# Patient Record
Sex: FEMALE | Race: BLACK OR AFRICAN AMERICAN | Employment: OTHER | URBAN - METROPOLITAN AREA
[De-identification: names, ages, dates, MRNs, and addresses within clinical notes are randomized per-mention and may not be internally consistent; named-entity substitution may affect disease eponyms.]

---

## 2019-01-01 ENCOUNTER — OP HISTORICAL/CONVERTED ENCOUNTER (OUTPATIENT)
Dept: OTHER | Age: 69
End: 2019-01-01

## 2019-07-05 ENCOUNTER — OP HISTORICAL/CONVERTED ENCOUNTER (OUTPATIENT)
Dept: OTHER | Age: 69
End: 2019-07-05

## 2019-08-13 ENCOUNTER — OP HISTORICAL/CONVERTED ENCOUNTER (OUTPATIENT)
Dept: OTHER | Age: 69
End: 2019-08-13

## 2019-10-29 ENCOUNTER — IP HISTORICAL/CONVERTED ENCOUNTER (OUTPATIENT)
Dept: OTHER | Age: 69
End: 2019-10-29

## 2020-01-01 ENCOUNTER — APPOINTMENT (OUTPATIENT)
Dept: GENERAL RADIOLOGY | Age: 70
End: 2020-01-01
Attending: EMERGENCY MEDICINE
Payer: MEDICARE

## 2020-01-01 ENCOUNTER — ANESTHESIA (OUTPATIENT)
Dept: ENDOSCOPY | Age: 70
End: 2020-01-01
Payer: MEDICARE

## 2020-01-01 ENCOUNTER — HOSPITAL ENCOUNTER (INPATIENT)
Age: 70
LOS: 3 days | Discharge: HOME HEALTH | DRG: 291 | End: 2020-11-17
Attending: EMERGENCY MEDICINE | Admitting: INTERNAL MEDICINE
Payer: MEDICARE

## 2020-01-01 ENCOUNTER — ANESTHESIA EVENT (OUTPATIENT)
Dept: ENDOSCOPY | Age: 70
End: 2020-01-01
Payer: MEDICARE

## 2020-01-01 ENCOUNTER — OP HISTORICAL/CONVERTED ENCOUNTER (OUTPATIENT)
Dept: OTHER | Age: 70
End: 2020-01-01

## 2020-01-01 ENCOUNTER — APPOINTMENT (OUTPATIENT)
Dept: CT IMAGING | Age: 70
End: 2020-01-01
Attending: EMERGENCY MEDICINE
Payer: MEDICARE

## 2020-01-01 ENCOUNTER — TRANSCRIBE ORDER (OUTPATIENT)
Dept: SCHEDULING | Age: 70
End: 2020-01-01

## 2020-01-01 ENCOUNTER — HOSPITAL ENCOUNTER (OUTPATIENT)
Dept: INFUSION THERAPY | Age: 70
Discharge: HOME | End: 2020-11-12
Payer: MEDICARE

## 2020-01-01 ENCOUNTER — ANESTHESIA EVENT (OUTPATIENT)
Dept: ANESTHESIOLOGY | Age: 70
End: 2020-01-01
Payer: MEDICARE

## 2020-01-01 ENCOUNTER — HOSPITAL ENCOUNTER (OUTPATIENT)
Age: 70
Setting detail: OUTPATIENT SURGERY
Discharge: HOME | End: 2020-10-08
Attending: INTERNAL MEDICINE | Admitting: INTERNAL MEDICINE
Payer: MEDICARE

## 2020-01-01 ENCOUNTER — HOSPITAL ENCOUNTER (EMERGENCY)
Age: 70
Discharge: ANOTHER HOSPITAL | End: 2020-10-26
Attending: EMERGENCY MEDICINE
Payer: MEDICARE

## 2020-01-01 ENCOUNTER — HOSPITAL ENCOUNTER (OUTPATIENT)
Dept: INTERVENTIONAL RADIOLOGY/VASCULAR | Age: 70
Discharge: HOME | End: 2020-11-24
Attending: INTERNAL MEDICINE

## 2020-01-01 ENCOUNTER — APPOINTMENT (OUTPATIENT)
Dept: GENERAL RADIOLOGY | Age: 70
DRG: 871 | End: 2020-01-01
Attending: HOSPITALIST
Payer: MEDICARE

## 2020-01-01 ENCOUNTER — HOSPITAL ENCOUNTER (INPATIENT)
Age: 70
LOS: 2 days | Discharge: HOME | DRG: 824 | End: 2020-10-28
Attending: EMERGENCY MEDICINE | Admitting: HOSPITALIST
Payer: MEDICARE

## 2020-01-01 ENCOUNTER — HOSPITAL ENCOUNTER (OUTPATIENT)
Dept: LAB | Age: 70
Discharge: HOME | End: 2020-11-11
Payer: MEDICARE

## 2020-01-01 ENCOUNTER — HOSPITAL ENCOUNTER (EMERGENCY)
Age: 70
Discharge: ANOTHER HOSPITAL | End: 2020-11-24
Attending: EMERGENCY MEDICINE
Payer: MEDICARE

## 2020-01-01 ENCOUNTER — HOSPITAL ENCOUNTER (OUTPATIENT)
Age: 70
Setting detail: OUTPATIENT SURGERY
Discharge: HOME | End: 2020-10-15
Attending: INTERNAL MEDICINE | Admitting: INTERNAL MEDICINE
Payer: MEDICARE

## 2020-01-01 ENCOUNTER — IP HISTORICAL/CONVERTED ENCOUNTER (OUTPATIENT)
Dept: OTHER | Age: 70
End: 2020-01-01

## 2020-01-01 ENCOUNTER — HOSPITAL ENCOUNTER (OUTPATIENT)
Dept: PREADMISSION TESTING | Age: 70
Discharge: HOME | End: 2020-10-11
Payer: COMMERCIAL

## 2020-01-01 ENCOUNTER — TELEPHONE (OUTPATIENT)
Dept: GASTROENTEROLOGY | Age: 70
End: 2020-01-01

## 2020-01-01 ENCOUNTER — ANESTHESIA (OUTPATIENT)
Dept: ANESTHESIOLOGY | Age: 70
End: 2020-01-01
Payer: MEDICARE

## 2020-01-01 ENCOUNTER — APPOINTMENT (OUTPATIENT)
Dept: CT IMAGING | Age: 70
DRG: 871 | End: 2020-01-01
Attending: HOSPITALIST
Payer: MEDICARE

## 2020-01-01 ENCOUNTER — APPOINTMENT (OUTPATIENT)
Dept: ENDOSCOPY | Age: 70
End: 2020-01-01
Attending: INTERNAL MEDICINE
Payer: MEDICARE

## 2020-01-01 ENCOUNTER — HOSPITAL ENCOUNTER (EMERGENCY)
Age: 70
Discharge: HOME | End: 2020-10-08
Attending: EMERGENCY MEDICINE
Payer: MEDICARE

## 2020-01-01 ENCOUNTER — HOSPITAL ENCOUNTER (INPATIENT)
Age: 70
LOS: 7 days | DRG: 871 | End: 2020-12-01
Attending: EMERGENCY MEDICINE | Admitting: INTERNAL MEDICINE
Payer: MEDICARE

## 2020-01-01 ENCOUNTER — APPOINTMENT (OUTPATIENT)
Dept: VASCULAR SURGERY | Age: 70
DRG: 291 | End: 2020-01-01
Attending: INTERNAL MEDICINE
Payer: MEDICARE

## 2020-01-01 ENCOUNTER — TRANSCRIBE ORDER (OUTPATIENT)
Dept: REGISTRATION | Age: 70
End: 2020-01-01

## 2020-01-01 ENCOUNTER — HOSPITAL ENCOUNTER (OUTPATIENT)
Dept: PREADMISSION TESTING | Age: 70
Discharge: HOME | End: 2020-10-04
Payer: MEDICARE

## 2020-01-01 ENCOUNTER — HOSPITAL ENCOUNTER (OUTPATIENT)
Dept: PET IMAGING | Age: 70
Discharge: HOME | End: 2020-11-21
Attending: INTERNAL MEDICINE
Payer: MEDICARE

## 2020-01-01 ENCOUNTER — APPOINTMENT (OUTPATIENT)
Dept: CT IMAGING | Age: 70
End: 2020-01-01
Attending: PHYSICIAN ASSISTANT
Payer: MEDICARE

## 2020-01-01 ENCOUNTER — APPOINTMENT (OUTPATIENT)
Dept: GENERAL RADIOLOGY | Age: 70
DRG: 291 | End: 2020-01-01
Attending: EMERGENCY MEDICINE
Payer: MEDICARE

## 2020-01-01 VITALS
DIASTOLIC BLOOD PRESSURE: 91 MMHG | HEART RATE: 60 BPM | OXYGEN SATURATION: 100 % | WEIGHT: 150 LBS | HEIGHT: 63 IN | RESPIRATION RATE: 20 BRPM | TEMPERATURE: 98.1 F | SYSTOLIC BLOOD PRESSURE: 204 MMHG | BODY MASS INDEX: 26.58 KG/M2

## 2020-01-01 VITALS
HEART RATE: 69 BPM | HEIGHT: 63 IN | OXYGEN SATURATION: 100 % | DIASTOLIC BLOOD PRESSURE: 71 MMHG | WEIGHT: 146 LBS | RESPIRATION RATE: 22 BRPM | BODY MASS INDEX: 25.87 KG/M2 | SYSTOLIC BLOOD PRESSURE: 179 MMHG | TEMPERATURE: 98.6 F

## 2020-01-01 VITALS
BODY MASS INDEX: 28.35 KG/M2 | WEIGHT: 160 LBS | HEART RATE: 65 BPM | OXYGEN SATURATION: 100 % | HEIGHT: 63 IN | SYSTOLIC BLOOD PRESSURE: 137 MMHG | DIASTOLIC BLOOD PRESSURE: 63 MMHG | TEMPERATURE: 98.1 F | RESPIRATION RATE: 16 BRPM

## 2020-01-01 VITALS
DIASTOLIC BLOOD PRESSURE: 77 MMHG | HEART RATE: 61 BPM | SYSTOLIC BLOOD PRESSURE: 169 MMHG | RESPIRATION RATE: 18 BRPM | TEMPERATURE: 97.2 F | OXYGEN SATURATION: 98 %

## 2020-01-01 VITALS
OXYGEN SATURATION: 95 % | HEART RATE: 60 BPM | RESPIRATION RATE: 18 BRPM | SYSTOLIC BLOOD PRESSURE: 147 MMHG | WEIGHT: 147 LBS | TEMPERATURE: 98.3 F | DIASTOLIC BLOOD PRESSURE: 71 MMHG | BODY MASS INDEX: 26.04 KG/M2

## 2020-01-01 VITALS
WEIGHT: 141.98 LBS | SYSTOLIC BLOOD PRESSURE: 151 MMHG | OXYGEN SATURATION: 94 % | DIASTOLIC BLOOD PRESSURE: 57 MMHG | HEART RATE: 56 BPM | BODY MASS INDEX: 25.15 KG/M2 | TEMPERATURE: 97.4 F | RESPIRATION RATE: 16 BRPM

## 2020-01-01 VITALS
SYSTOLIC BLOOD PRESSURE: 174 MMHG | RESPIRATION RATE: 26 BRPM | OXYGEN SATURATION: 88 % | WEIGHT: 146 LBS | DIASTOLIC BLOOD PRESSURE: 82 MMHG | HEART RATE: 60 BPM | BODY MASS INDEX: 25.87 KG/M2 | HEIGHT: 63 IN | TEMPERATURE: 98.6 F

## 2020-01-01 VITALS
BODY MASS INDEX: 26.35 KG/M2 | HEART RATE: 67 BPM | WEIGHT: 148.77 LBS | SYSTOLIC BLOOD PRESSURE: 121 MMHG | TEMPERATURE: 97.6 F | DIASTOLIC BLOOD PRESSURE: 55 MMHG | OXYGEN SATURATION: 97 % | RESPIRATION RATE: 20 BRPM

## 2020-01-01 VITALS
TEMPERATURE: 100.9 F | HEART RATE: 79 BPM | OXYGEN SATURATION: 94 % | SYSTOLIC BLOOD PRESSURE: 175 MMHG | RESPIRATION RATE: 21 BRPM | DIASTOLIC BLOOD PRESSURE: 81 MMHG

## 2020-01-01 VITALS
TEMPERATURE: 98.2 F | HEIGHT: 63 IN | DIASTOLIC BLOOD PRESSURE: 82 MMHG | BODY MASS INDEX: 28.67 KG/M2 | WEIGHT: 161.82 LBS | OXYGEN SATURATION: 100 % | SYSTOLIC BLOOD PRESSURE: 125 MMHG

## 2020-01-01 DIAGNOSIS — E63.9 POOR NUTRITION: ICD-10-CM

## 2020-01-01 DIAGNOSIS — R13.10 DYSPHAGIA, UNSPECIFIED TYPE: Primary | ICD-10-CM

## 2020-01-01 DIAGNOSIS — C85.90 LYMPHOMA (HCC): ICD-10-CM

## 2020-01-01 DIAGNOSIS — C83.31 RETICULOSARCOMA OF LYMPH NODES OF HEAD, FACE, AND NECK (HCC): Primary | ICD-10-CM

## 2020-01-01 DIAGNOSIS — N28.9 RENAL INSUFFICIENCY: ICD-10-CM

## 2020-01-01 DIAGNOSIS — J35.8 TONSILLAR MASS: Primary | ICD-10-CM

## 2020-01-01 DIAGNOSIS — I10 HYPERTENSION, UNSPECIFIED TYPE: ICD-10-CM

## 2020-01-01 DIAGNOSIS — J18.9 PNEUMONIA DUE TO INFECTIOUS ORGANISM, UNSPECIFIED LATERALITY, UNSPECIFIED PART OF LUNG: Primary | ICD-10-CM

## 2020-01-01 DIAGNOSIS — J39.2 THROAT MASS: Primary | ICD-10-CM

## 2020-01-01 DIAGNOSIS — C83.10 MALIGNANT LYMPHOMA, CENTROCYTIC (HCC): ICD-10-CM

## 2020-01-01 DIAGNOSIS — U07.1 COVID-19: ICD-10-CM

## 2020-01-01 DIAGNOSIS — D64.9 ANEMIA: Primary | ICD-10-CM

## 2020-01-01 DIAGNOSIS — Z71.89 ADVANCED CARE PLANNING/COUNSELING DISCUSSION: ICD-10-CM

## 2020-01-01 DIAGNOSIS — D64.9 ANEMIA: ICD-10-CM

## 2020-01-01 DIAGNOSIS — E88.09 HYPOALBUMINEMIA: ICD-10-CM

## 2020-01-01 DIAGNOSIS — Z71.89 DNR (DO NOT RESUSCITATE) DISCUSSION: ICD-10-CM

## 2020-01-01 DIAGNOSIS — Z71.89 GOALS OF CARE, COUNSELING/DISCUSSION: ICD-10-CM

## 2020-01-01 DIAGNOSIS — J18.9 PNEUMONIA OF BOTH LUNGS DUE TO INFECTIOUS ORGANISM, UNSPECIFIED PART OF LUNG: ICD-10-CM

## 2020-01-01 DIAGNOSIS — C83.31 RETICULOSARCOMA OF LYMPH NODES OF HEAD, FACE, AND NECK (HCC): ICD-10-CM

## 2020-01-01 DIAGNOSIS — I50.9 CONGESTIVE HEART FAILURE, UNSPECIFIED HF CHRONICITY, UNSPECIFIED HEART FAILURE TYPE (HCC): ICD-10-CM

## 2020-01-01 DIAGNOSIS — I50.82 BIVENTRICULAR CONGESTIVE HEART FAILURE (HCC): Primary | ICD-10-CM

## 2020-01-01 DIAGNOSIS — R51.9 ACUTE NONINTRACTABLE HEADACHE, UNSPECIFIED HEADACHE TYPE: ICD-10-CM

## 2020-01-01 DIAGNOSIS — Z20.822 SUSPECTED COVID-19 VIRUS INFECTION: ICD-10-CM

## 2020-01-01 DIAGNOSIS — D64.9 ANEMIA, UNSPECIFIED TYPE: ICD-10-CM

## 2020-01-01 DIAGNOSIS — D47.Z1 POST-TRANSPLANT LYMPHOPROLIFERATIVE DISORDER (HCC): ICD-10-CM

## 2020-01-01 DIAGNOSIS — R41.82 ALTERED MENTAL STATUS, UNSPECIFIED ALTERED MENTAL STATUS TYPE: ICD-10-CM

## 2020-01-01 DIAGNOSIS — J39.2 OROPHARYNGEAL MASS: Primary | ICD-10-CM

## 2020-01-01 LAB
ABO + RH BLD: NORMAL
ABO + RH BLD: NORMAL
ALBUMIN SERPL-MCNC: 2.5 G/DL (ref 3.5–5)
ALBUMIN SERPL-MCNC: 2.6 G/DL (ref 3.5–5)
ALBUMIN SERPL-MCNC: 2.7 G/DL (ref 3.5–5)
ALBUMIN SERPL-MCNC: 2.9 G/DL (ref 3.5–5)
ALBUMIN SERPL-MCNC: 2.9 G/DL (ref 3.5–5)
ALBUMIN SERPL-MCNC: 3.2 G/DL (ref 3.5–5)
ALBUMIN SERPL-MCNC: 3.2 G/DL (ref 3.5–5)
ALBUMIN SERPL-MCNC: 3.4 G/DL (ref 3.5–5)
ALBUMIN SERPL-MCNC: 3.6 G/DL (ref 3.5–5)
ALBUMIN/GLOB SERPL: 0.6 {RATIO} (ref 1.1–2.2)
ALBUMIN/GLOB SERPL: 0.7 {RATIO} (ref 1.1–2.2)
ALBUMIN/GLOB SERPL: 0.8 {RATIO} (ref 1.1–2.2)
ALBUMIN/GLOB SERPL: 0.9 {RATIO} (ref 1.1–2.2)
ALP SERPL-CCNC: 104 U/L (ref 45–117)
ALP SERPL-CCNC: 113 U/L (ref 45–117)
ALP SERPL-CCNC: 117 U/L (ref 45–117)
ALP SERPL-CCNC: 127 U/L (ref 45–117)
ALP SERPL-CCNC: 133 U/L (ref 45–117)
ALP SERPL-CCNC: 133 U/L (ref 45–117)
ALP SERPL-CCNC: 144 U/L (ref 45–117)
ALP SERPL-CCNC: 95 U/L (ref 45–117)
ALP SERPL-CCNC: 98 U/L (ref 45–117)
ALT SERPL-CCNC: 10 U/L (ref 12–78)
ALT SERPL-CCNC: 12 U/L (ref 12–78)
ALT SERPL-CCNC: 17 U/L (ref 12–78)
ALT SERPL-CCNC: 19 U/L (ref 12–78)
ALT SERPL-CCNC: 54 U/L (ref 12–78)
ALT SERPL-CCNC: 7 U/L (ref 12–78)
ALT SERPL-CCNC: 71 U/L (ref 12–78)
ALT SERPL-CCNC: 9 U/L (ref 12–78)
ALT SERPL-CCNC: <6 U/L (ref 12–78)
ANION GAP SERPL CALC-SCNC: 10 MMOL/L (ref 5–15)
ANION GAP SERPL CALC-SCNC: 10 MMOL/L (ref 5–15)
ANION GAP SERPL CALC-SCNC: 12 MMOL/L (ref 5–15)
ANION GAP SERPL CALC-SCNC: 13 MMOL/L (ref 5–15)
ANION GAP SERPL CALC-SCNC: 4 MMOL/L (ref 5–15)
ANION GAP SERPL CALC-SCNC: 5 MMOL/L (ref 5–15)
ANION GAP SERPL CALC-SCNC: 5 MMOL/L (ref 5–15)
ANION GAP SERPL CALC-SCNC: 6 MMOL/L (ref 5–15)
ANION GAP SERPL CALC-SCNC: 7 MMOL/L (ref 5–15)
ANION GAP SERPL CALC-SCNC: 8 MMOL/L (ref 5–15)
ANION GAP SERPL CALC-SCNC: 9 MMOL/L (ref 5–15)
ANION GAP SERPL CALC-SCNC: 9 MMOL/L (ref 5–15)
APPEARANCE UR: CLEAR
APTT PPP: 40 SEC (ref 22.1–31)
APTT PPP: 40.7 SEC (ref 23–35.7)
ARTERIAL PATENCY WRIST A: ABNORMAL
ARTERIAL PATENCY WRIST A: YES
AST SERPL W P-5'-P-CCNC: 11 U/L (ref 15–37)
AST SERPL W P-5'-P-CCNC: 15 U/L (ref 15–37)
AST SERPL W P-5'-P-CCNC: 17 U/L (ref 15–37)
AST SERPL W P-5'-P-CCNC: 20 U/L (ref 15–37)
AST SERPL W P-5'-P-CCNC: 8 U/L (ref 15–37)
AST SERPL-CCNC: 29 U/L (ref 15–37)
AST SERPL-CCNC: 31 U/L (ref 15–37)
AST SERPL-CCNC: 33 U/L (ref 15–37)
AST SERPL-CCNC: 34 U/L (ref 15–37)
ATRIAL RATE: 61 BPM
ATRIAL RATE: 65 BPM
ATRIAL RATE: 76 BPM
BACTERIA SPEC CULT: NORMAL
BACTERIA URNS QL MICRO: NEGATIVE /HPF
BASE DEFICIT BLD-SCNC: 4 MMOL/L
BASE DEFICIT BLD-SCNC: 5 MMOL/L
BASE DEFICIT BLD-SCNC: 5 MMOL/L
BASE DEFICIT BLD-SCNC: 6 MMOL/L
BASOPHILS # BLD: 0 K/UL (ref 0–0.1)
BASOPHILS # BLD: 0 K/UL (ref 0–0.2)
BASOPHILS NFR BLD: 0 % (ref 0–1)
BASOPHILS NFR BLD: 0 % (ref 0–2.5)
BASOPHILS NFR BLD: 0 % (ref 0–2.5)
BASOPHILS NFR BLD: 1 % (ref 0–2.5)
BDY SITE: ABNORMAL
BILIRUB SERPL-MCNC: 0.3 MG/DL (ref 0.2–1)
BILIRUB SERPL-MCNC: 0.4 MG/DL (ref 0.2–1)
BILIRUB SERPL-MCNC: 0.5 MG/DL (ref 0.2–1)
BILIRUB SERPL-MCNC: 0.6 MG/DL (ref 0.2–1)
BILIRUB SERPL-MCNC: 0.7 MG/DL (ref 0.2–1)
BILIRUB UR QL: NEGATIVE
BLD PROD TYP BPU: NORMAL
BLD PROD TYP BPU: NORMAL
BLOOD BANK CMNT PATIENT-IMP: NORMAL
BLOOD GROUP ANTIBODIES SERPL: NEGATIVE
BLOOD GROUP ANTIBODIES SERPL: NORMAL
BLOOD GROUP ANTIBODIES SERPL: NORMAL
BNP SERPL-MCNC: ABNORMAL PG/ML
BPU ID: NORMAL
BPU ID: NORMAL
BUN SERPL-MCNC: 40 MG/DL (ref 6–20)
BUN SERPL-MCNC: 42 MG/DL (ref 6–20)
BUN SERPL-MCNC: 43 MG/DL (ref 6–20)
BUN SERPL-MCNC: 46 MG/DL (ref 6–20)
BUN SERPL-MCNC: 48 MG/DL (ref 6–20)
BUN SERPL-MCNC: 48 MG/DL (ref 6–20)
BUN SERPL-MCNC: 49 MG/DL (ref 6–20)
BUN SERPL-MCNC: 50 MG/DL (ref 6–20)
BUN SERPL-MCNC: 51 MG/DL (ref 6–20)
BUN SERPL-MCNC: 52 MG/DL (ref 6–20)
BUN SERPL-MCNC: 53 MG/DL (ref 6–20)
BUN SERPL-MCNC: 54 MG/DL (ref 6–20)
BUN SERPL-MCNC: 55 MG/DL (ref 6–20)
BUN SERPL-MCNC: 56 MG/DL (ref 6–20)
BUN SERPL-MCNC: 57 MG/DL (ref 6–20)
BUN/CREAT SERPL: 17 (ref 12–20)
BUN/CREAT SERPL: 20 (ref 12–20)
BUN/CREAT SERPL: 21 (ref 12–20)
BUN/CREAT SERPL: 22 (ref 12–20)
BUN/CREAT SERPL: 23 (ref 12–20)
BUN/CREAT SERPL: 24 (ref 12–20)
BUN/CREAT SERPL: 25 (ref 12–20)
BUN/CREAT SERPL: 27 (ref 12–20)
BUN/CREAT SERPL: 28 (ref 12–20)
BUN/CREAT SERPL: 29 (ref 12–20)
BUN/CREAT SERPL: 30 (ref 12–20)
BUN/CREAT SERPL: 30 (ref 12–20)
BUN/CREAT SERPL: 32 (ref 12–20)
BUN/CREAT SERPL: 33 (ref 12–20)
C DIFF TOX GENS STL QL NAA+PROBE: POSITIVE
CA-I BLD-MCNC: 10 MG/DL (ref 8.5–10.1)
CA-I BLD-MCNC: 8.7 MG/DL (ref 8.5–10.1)
CA-I BLD-MCNC: 9.1 MG/DL (ref 8.5–10.1)
CA-I BLD-MCNC: 9.2 MG/DL (ref 8.5–10.1)
CA-I BLD-MCNC: 9.3 MG/DL (ref 8.5–10.1)
CA-I BLD-MCNC: 9.4 MG/DL (ref 8.5–10.1)
CA-I BLD-MCNC: 9.9 MG/DL (ref 8.5–10.1)
CA-I BLD-SCNC: 1.33 MMOL/L (ref 1.12–1.32)
CA-I BLD-SCNC: 1.35 MMOL/L (ref 1.12–1.32)
CA-I BLD-SCNC: 1.38 MMOL/L (ref 1.12–1.32)
CA-I BLD-SCNC: 1.4 MMOL/L (ref 1.12–1.32)
CALCIUM SERPL-MCNC: 5.9 MG/DL (ref 8.5–10.1)
CALCIUM SERPL-MCNC: 8.9 MG/DL (ref 8.5–10.1)
CALCIUM SERPL-MCNC: 9 MG/DL (ref 8.5–10.1)
CALCIUM SERPL-MCNC: 9 MG/DL (ref 8.5–10.1)
CALCIUM SERPL-MCNC: 9.2 MG/DL (ref 8.5–10.1)
CALCIUM SERPL-MCNC: 9.3 MG/DL (ref 8.5–10.1)
CALCIUM SERPL-MCNC: 9.4 MG/DL (ref 8.5–10.1)
CALCIUM SERPL-MCNC: 9.6 MG/DL (ref 8.5–10.1)
CALCIUM SERPL-MCNC: 9.7 MG/DL (ref 8.5–10.1)
CALCULATED P AXIS, ECG09: 2 DEGREES
CALCULATED P AXIS, ECG09: 37 DEGREES
CALCULATED P AXIS, ECG09: 58 DEGREES
CALCULATED R AXIS, ECG10: -10 DEGREES
CALCULATED R AXIS, ECG10: -14 DEGREES
CALCULATED R AXIS, ECG10: -9 DEGREES
CALCULATED T AXIS, ECG11: 119 DEGREES
CALCULATED T AXIS, ECG11: 94 DEGREES
CALCULATED T AXIS, ECG11: 97 DEGREES
CHLORIDE SERPL-SCNC: 100 MMOL/L (ref 97–108)
CHLORIDE SERPL-SCNC: 101 MMOL/L (ref 97–108)
CHLORIDE SERPL-SCNC: 102 MMOL/L (ref 97–108)
CHLORIDE SERPL-SCNC: 103 MMOL/L (ref 97–108)
CHLORIDE SERPL-SCNC: 104 MMOL/L (ref 97–108)
CHLORIDE SERPL-SCNC: 105 MMOL/L (ref 97–108)
CHLORIDE SERPL-SCNC: 106 MMOL/L (ref 97–108)
CHLORIDE SERPL-SCNC: 108 MMOL/L (ref 97–108)
CHLORIDE SERPL-SCNC: 108 MMOL/L (ref 97–108)
CHLORIDE SERPL-SCNC: 109 MMOL/L (ref 97–108)
CHLORIDE SERPL-SCNC: 109 MMOL/L (ref 97–108)
CHLORIDE SERPL-SCNC: 110 MMOL/L (ref 97–108)
CHLORIDE SERPL-SCNC: 114 MMOL/L (ref 97–108)
CHLORIDE SERPL-SCNC: 119 MMOL/L (ref 97–108)
CHLORIDE SERPL-SCNC: 97 MMOL/L (ref 97–108)
CHLORIDE SERPL-SCNC: 98 MMOL/L (ref 97–108)
CHLORIDE SERPL-SCNC: 98 MMOL/L (ref 97–108)
CHLORIDE SERPL-SCNC: 99 MMOL/L (ref 97–108)
CHLORIDE UR-SCNC: 59 MMOL/L
CO2 SERPL-SCNC: 14 MMOL/L (ref 21–32)
CO2 SERPL-SCNC: 14 MMOL/L (ref 21–32)
CO2 SERPL-SCNC: 17 MMOL/L (ref 21–32)
CO2 SERPL-SCNC: 20 MMOL/L (ref 21–32)
CO2 SERPL-SCNC: 22 MMOL/L (ref 21–32)
CO2 SERPL-SCNC: 24 MMOL/L (ref 21–32)
CO2 SERPL-SCNC: 25 MMOL/L (ref 21–32)
CO2 SERPL-SCNC: 26 MMOL/L (ref 21–32)
CO2 SERPL-SCNC: 27 MMOL/L (ref 21–32)
CO2 SERPL-SCNC: 27 MMOL/L (ref 21–32)
CO2 SERPL-SCNC: 29 MMOL/L (ref 21–32)
CO2 SERPL-SCNC: 32 MMOL/L (ref 21–32)
CO2 SERPL-SCNC: 33 MMOL/L (ref 21–32)
COLOR UR: ABNORMAL
COVID-19 RAPID TEST, COVR: DETECTED
CREAT SERPL-MCNC: 1.53 MG/DL (ref 0.55–1.02)
CREAT SERPL-MCNC: 1.65 MG/DL (ref 0.55–1.02)
CREAT SERPL-MCNC: 1.69 MG/DL (ref 0.55–1.02)
CREAT SERPL-MCNC: 1.72 MG/DL (ref 0.55–1.02)
CREAT SERPL-MCNC: 1.76 MG/DL (ref 0.55–1.02)
CREAT SERPL-MCNC: 1.78 MG/DL (ref 0.55–1.02)
CREAT SERPL-MCNC: 1.8 MG/DL (ref 0.55–1.02)
CREAT SERPL-MCNC: 1.85 MG/DL (ref 0.55–1.02)
CREAT SERPL-MCNC: 1.99 MG/DL (ref 0.55–1.02)
CREAT SERPL-MCNC: 2.01 MG/DL (ref 0.55–1.02)
CREAT SERPL-MCNC: 2.01 MG/DL (ref 0.55–1.02)
CREAT SERPL-MCNC: 2.04 MG/DL (ref 0.55–1.02)
CREAT SERPL-MCNC: 2.05 MG/DL (ref 0.55–1.02)
CREAT SERPL-MCNC: 2.1 MG/DL (ref 0.55–1.02)
CREAT SERPL-MCNC: 2.12 MG/DL (ref 0.55–1.02)
CREAT SERPL-MCNC: 2.14 MG/DL (ref 0.55–1.02)
CREAT SERPL-MCNC: 2.18 MG/DL (ref 0.55–1.02)
CREAT SERPL-MCNC: 2.38 MG/DL (ref 0.55–1.02)
CREAT SERPL-MCNC: 2.41 MG/DL (ref 0.55–1.02)
CREAT SERPL-MCNC: 2.43 MG/DL (ref 0.55–1.02)
CREAT UR-MCNC: 38.5 MG/DL
CREAT UR-MCNC: 39 MG/DL
CREAT UR-MCNC: 39.6 MG/DL
CROSSMATCH RESULT,%XM: NORMAL
CROSSMATCH RESULT,%XM: NORMAL
CRP SERPL-MCNC: 3.55 MG/DL (ref 0–0.6)
CRP SERPL-MCNC: 7.25 MG/DL (ref 0–0.6)
D DIMER PPP FEU-MCNC: 0.89 MG/L FEU (ref 0–0.65)
D DIMER PPP FEU-MCNC: 1 MG/L FEU (ref 0–0.65)
D DIMER PPP FEU-MCNC: 1.09 MG/L FEU (ref 0–0.65)
D DIMER PPP FEU-MCNC: 1.17 MG/L FEU (ref 0–0.65)
D DIMER PPP FEU-MCNC: 1.2 MG/L FEU (ref 0–0.65)
D DIMER PPP FEU-MCNC: 1.23 MG/L FEU (ref 0–0.65)
D DIMER PPP FEU-MCNC: 1.28 MG/L FEU (ref 0–0.65)
D DIMER PPP FEU-MCNC: 1.4 MG/L FEU (ref 0–0.65)
D DIMER PPP FEU-MCNC: 2.65 MG/L FEU (ref 0.19–0.5)
DEPRECATED S PYO AG THROAT QL EIA: NEGATIVE
DIAGNOSIS, 93000: NORMAL
DIFFERENTIAL METHOD BLD: ABNORMAL
ECHO AO ROOT DIAM: 3.13 CM
ECHO AV AREA PEAK VELOCITY: 2.22 CM2
ECHO AV AREA VTI: 2.16 CM2
ECHO AV MEAN GRADIENT: 9.05 MMHG
ECHO AV MEAN VELOCITY: 143.67 CM/S
ECHO AV PEAK GRADIENT: 15.45 MMHG
ECHO AV PEAK VELOCITY: 196.55 CM/S
ECHO AV VTI: 46.74 CM
ECHO EST RA PRESSURE: 8 MMHG
ECHO LA VOL 2C: 49.09 ML (ref 22–52)
ECHO LA VOL 4C: 50.65 ML (ref 22–52)
ECHO LA VOL BP: 52.34 ML (ref 22–52)
ECHO LV EDV A2C: 72.27 ML
ECHO LV EDV BP: 48.99 ML (ref 56–104)
ECHO LV EJECTION FRACTION A2C: 68 PERCENT
ECHO LV EJECTION FRACTION BIPLANE: 56.9 PERCENT (ref 55–100)
ECHO LV EJECTION FRACTION BIPLANE: 56.9 PERCENT (ref 55–100)
ECHO LV ESV A2C: 16.63 ML
ECHO LV ESV BP: 21.12 ML (ref 19–49)
ECHO LV INTERNAL DIMENSION DIASTOLIC: 4.05 CM (ref 3.9–5.3)
ECHO LV INTERNAL DIMENSION SYSTOLIC: 2.48 CM
ECHO LV IVSD: 1.42 CM (ref 0.6–0.9)
ECHO LV MASS 2D: 203.5 G (ref 67–162)
ECHO LV POSTERIOR WALL DIASTOLIC: 1.3 CM (ref 0.6–0.9)
ECHO LVOT DIAM: 2.09 CM
ECHO LVOT PEAK GRADIENT: 6.55 MMHG
ECHO LVOT PEAK VELOCITY: 127.97 CM/S
ECHO LVOT SV: 51.3 ML
ECHO LVOT SV: 51.3 ML
ECHO LVOT VTI: 29.55 CM
ECHO MV A VELOCITY: 99.75 CM/S
ECHO MV AREA PHT: 3.41 CM2
ECHO MV AREA PHT: 3.41 CM2
ECHO MV E DECELERATION TIME (DT): 222.5 MS
ECHO MV E VELOCITY: 91.82 CM/S
ECHO MV E/A RATIO: 0.92
ECHO MV PRESSURE HALF TIME (PHT): 64.52 MS
ECHO RA AREA 4C: 15 CM2
ECHO RIGHT VENTRICULAR SYSTOLIC PRESSURE (RVSP): 47 MMHG
ECHO RV INTERNAL DIMENSION: 3.1 CM
ECHO TV REGURGITANT MAX VELOCITY: 312.29 CM/S
ECHO TV REGURGITANT PEAK GRADIENT: 39.01 MMHG
EOSINOPHIL # BLD: 0 K/UL (ref 0–0.4)
EOSINOPHIL # BLD: 0 K/UL (ref 0–0.7)
EOSINOPHIL # BLD: 0.1 K/UL (ref 0–0.4)
EOSINOPHIL # BLD: 0.1 K/UL (ref 0–0.7)
EOSINOPHIL # BLD: 0.1 K/UL (ref 0–0.7)
EOSINOPHIL NFR BLD: 0 % (ref 0.9–2.9)
EOSINOPHIL NFR BLD: 0 % (ref 0–7)
EOSINOPHIL NFR BLD: 1 % (ref 0.9–2.9)
EOSINOPHIL NFR BLD: 1 % (ref 0–7)
EOSINOPHIL NFR BLD: 1 % (ref 0–7)
EOSINOPHIL NFR BLD: 2 % (ref 0.9–2.9)
ERYTHROCYTE [DISTWIDTH] IN BLOOD BY AUTOMATED COUNT: 16.6 % (ref 11.5–14.5)
ERYTHROCYTE [DISTWIDTH] IN BLOOD BY AUTOMATED COUNT: 16.7 % (ref 11.5–14.5)
ERYTHROCYTE [DISTWIDTH] IN BLOOD BY AUTOMATED COUNT: 16.8 % (ref 11.5–14.5)
ERYTHROCYTE [DISTWIDTH] IN BLOOD BY AUTOMATED COUNT: 16.8 % (ref 11.5–14.5)
ERYTHROCYTE [DISTWIDTH] IN BLOOD BY AUTOMATED COUNT: 16.9 % (ref 11.5–14.5)
ERYTHROCYTE [DISTWIDTH] IN BLOOD BY AUTOMATED COUNT: 17.3 % (ref 11.5–14.5)
ERYTHROCYTE [DISTWIDTH] IN BLOOD BY AUTOMATED COUNT: 17.4 % (ref 11.5–14.5)
ERYTHROCYTE [DISTWIDTH] IN BLOOD BY AUTOMATED COUNT: 17.6 % (ref 11.5–14.5)
ERYTHROCYTE [DISTWIDTH] IN BLOOD BY AUTOMATED COUNT: 17.6 % (ref 11.5–14.5)
ERYTHROCYTE [DISTWIDTH] IN BLOOD BY AUTOMATED COUNT: 17.7 % (ref 11.5–14.5)
ERYTHROCYTE [DISTWIDTH] IN BLOOD BY AUTOMATED COUNT: 18 % (ref 11.5–14.5)
ERYTHROCYTE [DISTWIDTH] IN BLOOD BY AUTOMATED COUNT: 18.6 % (ref 11.5–14.5)
ERYTHROCYTE [DISTWIDTH] IN BLOOD BY AUTOMATED COUNT: 19.4 % (ref 11.5–14.5)
EST. AVERAGE GLUCOSE BLD GHB EST-MCNC: 169 MG/DL
EST. AVERAGE GLUCOSE BLD GHB EST-MCNC: NORMAL MG/DL
FERRITIN SERPL-MCNC: 1158 NG/ML (ref 8–252)
FERRITIN SERPL-MCNC: 446 NG/ML (ref 8–252)
FLUAV AG NPH QL IA: NEGATIVE
FLUBV AG NOSE QL IA: NEGATIVE
FLUID CULTURE, SPNG2: NORMAL
GAS FLOW.O2 O2 DELIVERY SYS: ABNORMAL L/MIN
GAS FLOW.O2 SETTING OXYMISER: 13 L/M
GAS FLOW.O2 SETTING OXYMISER: 3.5 L/M
GAS FLOW.O2 SETTING OXYMISER: 4 L/M
GAS FLOW.O2 SETTING OXYMISER: 40 L/M
GLOBULIN SER CALC-MCNC: 3.5 G/DL (ref 2–4)
GLOBULIN SER CALC-MCNC: 3.9 G/DL (ref 2–4)
GLOBULIN SER CALC-MCNC: 4 G/DL (ref 2–4)
GLOBULIN SER CALC-MCNC: 4.1 G/DL (ref 2–4)
GLOBULIN SER CALC-MCNC: 4.3 G/DL (ref 2–4)
GLOBULIN SER CALC-MCNC: 4.5 G/DL (ref 2–4)
GLUCOSE BLD STRIP.AUTO-MCNC: 109 MG/DL (ref 65–100)
GLUCOSE BLD STRIP.AUTO-MCNC: 119 MG/DL (ref 65–100)
GLUCOSE BLD STRIP.AUTO-MCNC: 121 MG/DL (ref 65–100)
GLUCOSE BLD STRIP.AUTO-MCNC: 139 MG/DL (ref 65–100)
GLUCOSE BLD STRIP.AUTO-MCNC: 139 MG/DL (ref 65–100)
GLUCOSE BLD STRIP.AUTO-MCNC: 140 MG/DL (ref 65–100)
GLUCOSE BLD STRIP.AUTO-MCNC: 142 MG/DL (ref 65–100)
GLUCOSE BLD STRIP.AUTO-MCNC: 144 MG/DL (ref 65–100)
GLUCOSE BLD STRIP.AUTO-MCNC: 146 MG/DL (ref 65–100)
GLUCOSE BLD STRIP.AUTO-MCNC: 148 MG/DL (ref 65–100)
GLUCOSE BLD STRIP.AUTO-MCNC: 152 MG/DL (ref 65–100)
GLUCOSE BLD STRIP.AUTO-MCNC: 153 MG/DL (ref 65–100)
GLUCOSE BLD STRIP.AUTO-MCNC: 154 MG/DL (ref 65–100)
GLUCOSE BLD STRIP.AUTO-MCNC: 160 MG/DL (ref 65–100)
GLUCOSE BLD STRIP.AUTO-MCNC: 163 MG/DL (ref 65–100)
GLUCOSE BLD STRIP.AUTO-MCNC: 168 MG/DL (ref 65–100)
GLUCOSE BLD STRIP.AUTO-MCNC: 177 MG/DL (ref 65–100)
GLUCOSE BLD STRIP.AUTO-MCNC: 183 MG/DL (ref 65–100)
GLUCOSE BLD STRIP.AUTO-MCNC: 198 MG/DL (ref 65–100)
GLUCOSE BLD STRIP.AUTO-MCNC: 205 MG/DL (ref 65–100)
GLUCOSE BLD STRIP.AUTO-MCNC: 208 MG/DL (ref 65–100)
GLUCOSE BLD STRIP.AUTO-MCNC: 209 MG/DL (ref 65–100)
GLUCOSE BLD STRIP.AUTO-MCNC: 221 MG/DL (ref 65–100)
GLUCOSE BLD STRIP.AUTO-MCNC: 226 MG/DL (ref 65–100)
GLUCOSE BLD STRIP.AUTO-MCNC: 227 MG/DL (ref 65–100)
GLUCOSE BLD STRIP.AUTO-MCNC: 231 MG/DL (ref 65–100)
GLUCOSE BLD STRIP.AUTO-MCNC: 231 MG/DL (ref 65–100)
GLUCOSE BLD STRIP.AUTO-MCNC: 232 MG/DL (ref 65–100)
GLUCOSE BLD STRIP.AUTO-MCNC: 234 MG/DL (ref 65–100)
GLUCOSE BLD STRIP.AUTO-MCNC: 241 MG/DL (ref 65–100)
GLUCOSE BLD STRIP.AUTO-MCNC: 244 MG/DL (ref 65–100)
GLUCOSE BLD STRIP.AUTO-MCNC: 244 MG/DL (ref 65–100)
GLUCOSE BLD STRIP.AUTO-MCNC: 255 MG/DL (ref 65–100)
GLUCOSE BLD STRIP.AUTO-MCNC: 257 MG/DL (ref 65–100)
GLUCOSE BLD STRIP.AUTO-MCNC: 263 MG/DL (ref 65–100)
GLUCOSE BLD STRIP.AUTO-MCNC: 266 MG/DL (ref 65–100)
GLUCOSE BLD STRIP.AUTO-MCNC: 269 MG/DL (ref 65–100)
GLUCOSE BLD STRIP.AUTO-MCNC: 273 MG/DL (ref 65–100)
GLUCOSE BLD STRIP.AUTO-MCNC: 282 MG/DL (ref 65–100)
GLUCOSE BLD STRIP.AUTO-MCNC: 282 MG/DL (ref 65–100)
GLUCOSE BLD STRIP.AUTO-MCNC: 285 MG/DL (ref 65–100)
GLUCOSE BLD STRIP.AUTO-MCNC: 295 MG/DL (ref 65–100)
GLUCOSE BLD STRIP.AUTO-MCNC: 299 MG/DL (ref 65–100)
GLUCOSE BLD STRIP.AUTO-MCNC: 301 MG/DL (ref 65–100)
GLUCOSE BLD STRIP.AUTO-MCNC: 317 MG/DL (ref 65–100)
GLUCOSE BLD STRIP.AUTO-MCNC: 325 MG/DL (ref 65–100)
GLUCOSE BLD STRIP.AUTO-MCNC: 336 MG/DL (ref 65–100)
GLUCOSE BLD STRIP.AUTO-MCNC: 343 MG/DL (ref 65–100)
GLUCOSE BLD STRIP.AUTO-MCNC: 354 MG/DL (ref 65–100)
GLUCOSE BLD STRIP.AUTO-MCNC: 98 MG/DL (ref 65–100)
GLUCOSE SERPL-MCNC: 106 MG/DL (ref 65–100)
GLUCOSE SERPL-MCNC: 136 MG/DL (ref 65–100)
GLUCOSE SERPL-MCNC: 141 MG/DL (ref 65–100)
GLUCOSE SERPL-MCNC: 143 MG/DL (ref 65–100)
GLUCOSE SERPL-MCNC: 151 MG/DL (ref 65–100)
GLUCOSE SERPL-MCNC: 161 MG/DL (ref 65–100)
GLUCOSE SERPL-MCNC: 167 MG/DL (ref 65–100)
GLUCOSE SERPL-MCNC: 177 MG/DL (ref 65–100)
GLUCOSE SERPL-MCNC: 178 MG/DL (ref 65–100)
GLUCOSE SERPL-MCNC: 180 MG/DL (ref 65–100)
GLUCOSE SERPL-MCNC: 188 MG/DL (ref 65–100)
GLUCOSE SERPL-MCNC: 209 MG/DL (ref 65–100)
GLUCOSE SERPL-MCNC: 220 MG/DL (ref 65–100)
GLUCOSE SERPL-MCNC: 235 MG/DL (ref 65–100)
GLUCOSE SERPL-MCNC: 240 MG/DL (ref 65–100)
GLUCOSE SERPL-MCNC: 240 MG/DL (ref 65–100)
GLUCOSE SERPL-MCNC: 249 MG/DL (ref 65–100)
GLUCOSE SERPL-MCNC: 284 MG/DL (ref 65–100)
GLUCOSE SERPL-MCNC: 302 MG/DL (ref 65–100)
GLUCOSE SERPL-MCNC: 364 MG/DL (ref 65–100)
GLUCOSE UR STRIP.AUTO-MCNC: 50 MG/DL
HBA1C MFR BLD: 7.5 % (ref 4–5.6)
HBA1C MFR BLD: NORMAL % (ref 4.2–5.8)
HBV CORE AB SERPL QL IA: NEGATIVE
HBV SURFACE AB SER QL: REACTIVE
HBV SURFACE AB SER-ACNC: 17.18 MIU/ML
HBV SURFACE AG SER QL: <0.1 INDEX
HBV SURFACE AG SER QL: NEGATIVE
HCO3 BLD-SCNC: 21.3 MMOL/L (ref 22–26)
HCO3 BLD-SCNC: 21.8 MMOL/L (ref 22–26)
HCO3 BLD-SCNC: 22.2 MMOL/L (ref 22–26)
HCO3 BLD-SCNC: 22.3 MMOL/L (ref 22–26)
HCT VFR BLD AUTO: 30.9 % (ref 36–46)
HCT VFR BLD AUTO: 31.4 % (ref 35–47)
HCT VFR BLD AUTO: 32.7 % (ref 35–47)
HCT VFR BLD AUTO: 32.9 % (ref 35–47)
HCT VFR BLD AUTO: 33.7 % (ref 36–46)
HCT VFR BLD AUTO: 33.8 % (ref 35–47)
HCT VFR BLD AUTO: 33.9 % (ref 35–47)
HCT VFR BLD AUTO: 34.9 % (ref 36–46)
HCT VFR BLD AUTO: 35.1 % (ref 35–47)
HCT VFR BLD AUTO: 36.2 % (ref 35–47)
HCT VFR BLD AUTO: 36.4 % (ref 35–47)
HCT VFR BLD AUTO: 37 % (ref 36–46)
HCT VFR BLD AUTO: 38.7 % (ref 35–47)
HCT VFR BLD AUTO: 40.8 % (ref 36–46)
HCT VFR BLD AUTO: 46 % (ref 35–47)
HCV AB SERPL QL IA: NONREACTIVE
HCV COMMENT,HCGAC: NORMAL
HEALTH STATUS, XMCV2T: ABNORMAL
HGB BLD-MCNC: 10 G/DL (ref 11.5–16)
HGB BLD-MCNC: 10.1 G/DL (ref 11.5–16)
HGB BLD-MCNC: 10.3 G/DL (ref 11.5–16)
HGB BLD-MCNC: 10.8 G/DL (ref 11.5–16)
HGB BLD-MCNC: 10.8 G/DL (ref 11.5–16)
HGB BLD-MCNC: 11.1 G/DL (ref 13.5–17.5)
HGB BLD-MCNC: 11.2 G/DL (ref 13.5–17.5)
HGB BLD-MCNC: 11.5 G/DL (ref 11.5–16)
HGB BLD-MCNC: 11.7 G/DL (ref 13.5–17.5)
HGB BLD-MCNC: 13 G/DL (ref 13.5–17.5)
HGB BLD-MCNC: 13.5 G/DL (ref 11.5–16)
HGB BLD-MCNC: 9.5 G/DL (ref 11.5–16)
HGB BLD-MCNC: 9.8 G/DL (ref 11.5–16)
HGB BLD-MCNC: 9.8 G/DL (ref 13.5–17.5)
HGB BLD-MCNC: 9.9 G/DL (ref 11.5–16)
HGB UR QL STRIP: NEGATIVE
HIV 1+2 AB+HIV1 P24 AG SERPL QL IA: NONREACTIVE
HIV12 RESULT COMMENT, HHIVC: NORMAL
IMM GRANULOCYTES # BLD AUTO: 0 K/UL (ref 0–0.04)
IMM GRANULOCYTES # BLD AUTO: 0.1 K/UL (ref 0–0.04)
IMM GRANULOCYTES # BLD AUTO: 0.1 K/UL (ref 0–0.04)
IMM GRANULOCYTES NFR BLD AUTO: 0 % (ref 0–0.5)
IMM GRANULOCYTES NFR BLD AUTO: 1 % (ref 0–0.5)
INR PPP: 1.1 (ref 0.9–1.1)
INR PPP: 1.2 (ref 0.9–1.1)
INR PPP: 1.2 (ref 0.9–1.1)
INTERPRETATION: ABNORMAL
IRON SATN MFR SERPL: 14 % (ref 20–50)
IRON SERPL-MCNC: 28 UG/DL (ref 35–150)
KETONES UR QL STRIP.AUTO: NEGATIVE MG/DL
L PNEUMO1 AG UR QL IA: NEGATIVE
LACTATE SERPL-SCNC: 0.5 MMOL/L (ref 0.4–2)
LACTATE SERPL-SCNC: 0.7 MMOL/L (ref 0.4–2)
LACTATE SERPL-SCNC: 1 MMOL/L (ref 0.4–2)
LDH SERPL L TO P-CCNC: 502 U/L (ref 81–246)
LDH SERPL L TO P-CCNC: 862 U/L (ref 81–246)
LEUKOCYTE ESTERASE UR QL STRIP.AUTO: NEGATIVE
LIPASE SERPL-CCNC: 114 U/L (ref 73–393)
LVOT MG: 2.83 MMHG
LYMPHOCYTES # BLD: 0.1 K/UL (ref 0.8–3.5)
LYMPHOCYTES # BLD: 0.1 K/UL (ref 0.8–3.5)
LYMPHOCYTES # BLD: 0.2 K/UL (ref 0.8–3.5)
LYMPHOCYTES # BLD: 0.3 K/UL (ref 1–4.8)
LYMPHOCYTES # BLD: 0.4 K/UL (ref 1–4.8)
LYMPHOCYTES # BLD: 0.5 K/UL (ref 0.8–3.5)
LYMPHOCYTES # BLD: 0.5 K/UL (ref 1–4.8)
LYMPHOCYTES # BLD: 0.7 K/UL (ref 0.8–3.5)
LYMPHOCYTES # BLD: 1 K/UL (ref 0.8–3.5)
LYMPHOCYTES NFR BLD: 1 % (ref 12–49)
LYMPHOCYTES NFR BLD: 1 % (ref 12–49)
LYMPHOCYTES NFR BLD: 12 % (ref 12–49)
LYMPHOCYTES NFR BLD: 16 % (ref 12–49)
LYMPHOCYTES NFR BLD: 18 % (ref 12–49)
LYMPHOCYTES NFR BLD: 21 % (ref 12–49)
LYMPHOCYTES NFR BLD: 3 % (ref 12–49)
LYMPHOCYTES NFR BLD: 5 % (ref 12–49)
LYMPHOCYTES NFR BLD: 5 % (ref 20.5–51.1)
LYMPHOCYTES NFR BLD: 6 % (ref 12–49)
LYMPHOCYTES NFR BLD: 7 % (ref 20.5–51.1)
LYMPHOCYTES NFR BLD: 8 % (ref 12–49)
LYMPHOCYTES NFR BLD: 8 % (ref 20.5–51.1)
LYMPHOCYTES NFR BLD: 9 % (ref 20.5–51.1)
LYMPHOCYTES NFR BLD: 9 % (ref 20.5–51.1)
MAGNESIUM SERPL-MCNC: 1.8 MG/DL (ref 1.6–2.4)
MAGNESIUM SERPL-MCNC: 2 MG/DL (ref 1.6–2.4)
MAGNESIUM SERPL-MCNC: 2.1 MG/DL (ref 1.6–2.4)
MAGNESIUM SERPL-MCNC: 2.3 MG/DL (ref 1.6–2.4)
MCH RBC QN AUTO: 26.8 PG (ref 26–34)
MCH RBC QN AUTO: 27.2 PG (ref 26–34)
MCH RBC QN AUTO: 27.2 PG (ref 26–34)
MCH RBC QN AUTO: 27.3 PG (ref 26–34)
MCH RBC QN AUTO: 27.3 PG (ref 26–34)
MCH RBC QN AUTO: 27.4 PG (ref 26–34)
MCH RBC QN AUTO: 27.6 PG (ref 26–34)
MCH RBC QN AUTO: 27.8 PG (ref 26–34)
MCH RBC QN AUTO: 28.1 PG (ref 31–34)
MCH RBC QN AUTO: 28.3 PG (ref 26–34)
MCH RBC QN AUTO: 28.3 PG (ref 31–34)
MCH RBC QN AUTO: 28.3 PG (ref 31–34)
MCH RBC QN AUTO: 28.5 PG (ref 31–34)
MCH RBC QN AUTO: 28.8 PG (ref 26–34)
MCH RBC QN AUTO: 29.1 PG (ref 31–34)
MCHC RBC AUTO-ENTMCNC: 29.3 G/DL (ref 30–36.5)
MCHC RBC AUTO-ENTMCNC: 29.3 G/DL (ref 30–36.5)
MCHC RBC AUTO-ENTMCNC: 29.5 G/DL (ref 30–36.5)
MCHC RBC AUTO-ENTMCNC: 29.7 G/DL (ref 30–36.5)
MCHC RBC AUTO-ENTMCNC: 29.7 G/DL (ref 30–36.5)
MCHC RBC AUTO-ENTMCNC: 29.8 G/DL (ref 30–36.5)
MCHC RBC AUTO-ENTMCNC: 29.9 G/DL (ref 30–36.5)
MCHC RBC AUTO-ENTMCNC: 30 G/DL (ref 30–36.5)
MCHC RBC AUTO-ENTMCNC: 30.1 G/DL (ref 30–36.5)
MCHC RBC AUTO-ENTMCNC: 30.3 G/DL (ref 30–36.5)
MCHC RBC AUTO-ENTMCNC: 31.5 G/DL (ref 31–36)
MCHC RBC AUTO-ENTMCNC: 31.7 G/DL (ref 31–36)
MCHC RBC AUTO-ENTMCNC: 31.9 G/DL (ref 31–36)
MCHC RBC AUTO-ENTMCNC: 32.1 G/DL (ref 31–36)
MCHC RBC AUTO-ENTMCNC: 33 G/DL (ref 31–36)
MCV RBC AUTO: 86.3 FL (ref 80–100)
MCV RBC AUTO: 88 FL (ref 80–100)
MCV RBC AUTO: 88.6 FL (ref 80–100)
MCV RBC AUTO: 89.3 FL (ref 80–100)
MCV RBC AUTO: 90.3 FL (ref 80–99)
MCV RBC AUTO: 91.6 FL (ref 80–99)
MCV RBC AUTO: 91.7 FL (ref 80–99)
MCV RBC AUTO: 91.9 FL (ref 80–100)
MCV RBC AUTO: 92.1 FL (ref 80–99)
MCV RBC AUTO: 92.4 FL (ref 80–99)
MCV RBC AUTO: 92.4 FL (ref 80–99)
MCV RBC AUTO: 92.6 FL (ref 80–99)
MCV RBC AUTO: 93.4 FL (ref 80–99)
MCV RBC AUTO: 94.7 FL (ref 80–99)
MCV RBC AUTO: 95.2 FL (ref 80–99)
MICROALBUMIN UR-MCNC: 98.6 MG/DL
MICROALBUMIN/CREAT UR-RTO: 2561 MG/G (ref 0–30)
MONOCYTES # BLD: 0.1 K/UL (ref 0–1)
MONOCYTES # BLD: 0.2 K/UL (ref 0–1)
MONOCYTES # BLD: 0.3 K/UL (ref 0–1)
MONOCYTES # BLD: 0.4 K/UL (ref 0–1)
MONOCYTES # BLD: 0.5 K/UL (ref 0.2–2.4)
MONOCYTES # BLD: 0.5 K/UL (ref 0–1)
MONOCYTES # BLD: 0.6 K/UL (ref 0.2–2.4)
MONOCYTES # BLD: 0.6 K/UL (ref 0.2–2.4)
MONOCYTES # BLD: 0.7 K/UL (ref 0.2–2.4)
MONOCYTES # BLD: 0.7 K/UL (ref 0.2–2.4)
MONOCYTES # BLD: 0.7 K/UL (ref 0–1)
MONOCYTES NFR BLD: 11 % (ref 1.7–9.3)
MONOCYTES NFR BLD: 11 % (ref 1.7–9.3)
MONOCYTES NFR BLD: 11 % (ref 5–13)
MONOCYTES NFR BLD: 12 % (ref 1.7–9.3)
MONOCYTES NFR BLD: 12 % (ref 1.7–9.3)
MONOCYTES NFR BLD: 14 % (ref 1.7–9.3)
MONOCYTES NFR BLD: 14 % (ref 5–13)
MONOCYTES NFR BLD: 17 % (ref 5–13)
MONOCYTES NFR BLD: 19 % (ref 5–13)
MONOCYTES NFR BLD: 2 % (ref 5–13)
MONOCYTES NFR BLD: 3 % (ref 5–13)
MONOCYTES NFR BLD: 3 % (ref 5–13)
MONOCYTES NFR BLD: 4 % (ref 5–13)
NEUTS SEG # BLD: 1.4 K/UL (ref 1.8–8)
NEUTS SEG # BLD: 1.6 K/UL (ref 1.8–8)
NEUTS SEG # BLD: 2.4 K/UL (ref 1.8–8)
NEUTS SEG # BLD: 2.5 K/UL (ref 1.8–8)
NEUTS SEG # BLD: 3.1 K/UL (ref 1.8–8)
NEUTS SEG # BLD: 3.3 K/UL (ref 1.8–8)
NEUTS SEG # BLD: 3.8 K/UL (ref 1.8–8)
NEUTS SEG # BLD: 4 K/UL (ref 1.8–7.7)
NEUTS SEG # BLD: 4 K/UL (ref 1.8–7.7)
NEUTS SEG # BLD: 4.2 K/UL (ref 1.8–7.7)
NEUTS SEG # BLD: 4.5 K/UL (ref 1.8–7.7)
NEUTS SEG # BLD: 5 K/UL (ref 1.8–7.7)
NEUTS SEG # BLD: 5.8 K/UL (ref 1.8–8)
NEUTS SEG # BLD: 8.2 K/UL (ref 1.8–8)
NEUTS SEG # BLD: 9 K/UL (ref 1.8–8)
NEUTS SEG NFR BLD: 64 % (ref 32–75)
NEUTS SEG NFR BLD: 67 % (ref 32–75)
NEUTS SEG NFR BLD: 68 % (ref 32–75)
NEUTS SEG NFR BLD: 75 % (ref 42–75)
NEUTS SEG NFR BLD: 77 % (ref 32–75)
NEUTS SEG NFR BLD: 78 % (ref 42–75)
NEUTS SEG NFR BLD: 78 % (ref 42–75)
NEUTS SEG NFR BLD: 79 % (ref 42–75)
NEUTS SEG NFR BLD: 81 % (ref 32–75)
NEUTS SEG NFR BLD: 84 % (ref 42–75)
NEUTS SEG NFR BLD: 91 % (ref 32–75)
NEUTS SEG NFR BLD: 91 % (ref 32–75)
NEUTS SEG NFR BLD: 93 % (ref 32–75)
NEUTS SEG NFR BLD: 96 % (ref 32–75)
NEUTS SEG NFR BLD: 96 % (ref 32–75)
NITRITE UR QL STRIP.AUTO: NEGATIVE
NRBC # BLD: 0 K/UL
NRBC # BLD: 0 K/UL
NRBC # BLD: 0 K/UL (ref 0–0.01)
NRBC # BLD: 0.01 K/UL
NRBC BLD-RTO: 0 PER 100 WBC
NRBC BLD-RTO: 10 PER 100 WBC
NRBC BLD-RTO: 20 PER 100 WBC
O2/TOTAL GAS SETTING VFR VENT: 100 %
ORGANISM ID, SPNG3: NORMAL
OSMOLALITY UR: 390 MOSM/KG H2O
P-R INTERVAL, ECG05: 152 MS
P-R INTERVAL, ECG05: 152 MS
P-R INTERVAL, ECG05: 160 MS
PCO2 BLD: 43.9 MMHG (ref 35–45)
PCO2 BLD: 48.2 MMHG (ref 35–45)
PCO2 BLD: 48.4 MMHG (ref 35–45)
PCO2 BLD: 49 MMHG (ref 35–45)
PCR REFLEX: ABNORMAL
PERFORMED BY, TECHID: ABNORMAL
PH BLD: 7.25 [PH] (ref 7.35–7.45)
PH BLD: 7.26 [PH] (ref 7.35–7.45)
PH BLD: 7.27 [PH] (ref 7.35–7.45)
PH BLD: 7.31 [PH] (ref 7.35–7.45)
PH UR STRIP: 7 [PH] (ref 5–8)
PHOSPHATE SERPL-MCNC: 2.9 MG/DL (ref 2.6–4.7)
PHOSPHATE SERPL-MCNC: 3 MG/DL (ref 2.6–4.7)
PHOSPHATE SERPL-MCNC: 3 MG/DL (ref 2.6–4.7)
PHOSPHATE SERPL-MCNC: 3.2 MG/DL (ref 2.6–4.7)
PHOSPHATE SERPL-MCNC: 3.5 MG/DL (ref 2.6–4.7)
PHOSPHATE SERPL-MCNC: 3.5 MG/DL (ref 2.6–4.7)
PHOSPHATE SERPL-MCNC: 3.7 MG/DL (ref 2.6–4.7)
PHOSPHATE SERPL-MCNC: 4.2 MG/DL (ref 2.6–4.7)
PLATELET # BLD AUTO: 155 K/UL (ref 150–400)
PLATELET # BLD AUTO: 185 K/UL (ref 150–400)
PLATELET # BLD AUTO: 192 K/UL (ref 150–400)
PLATELET # BLD AUTO: 193 K/UL (ref 150–400)
PLATELET # BLD AUTO: 197 K/UL (ref 150–400)
PLATELET # BLD AUTO: 198 K/UL (ref 150–400)
PLATELET # BLD AUTO: 201 K/UL (ref 150–400)
PLATELET # BLD AUTO: 213 K/UL (ref 150–400)
PLATELET # BLD AUTO: 222 K/UL (ref 150–400)
PLATELET # BLD AUTO: 227 K/UL
PLATELET # BLD AUTO: 239 K/UL (ref 150–400)
PLATELET # BLD AUTO: 243 K/UL
PLATELET # BLD AUTO: 258 K/UL
PLATELET # BLD AUTO: 287 K/UL
PLATELET # BLD AUTO: 295 K/UL
PLATELET COMMENTS,PCOM: ABNORMAL
PLATELET COMMENTS,PCOM: ABNORMAL
PLEASE NOTE, SPNG4: NORMAL
PMV BLD AUTO: 10.2 FL (ref 8.9–12.9)
PMV BLD AUTO: 10.3 FL (ref 8.9–12.9)
PMV BLD AUTO: 10.4 FL (ref 8.9–12.9)
PMV BLD AUTO: 10.5 FL (ref 8.9–12.9)
PMV BLD AUTO: 10.6 FL (ref 8.9–12.9)
PMV BLD AUTO: 10.6 FL (ref 8.9–12.9)
PMV BLD AUTO: 10.8 FL (ref 8.9–12.9)
PMV BLD AUTO: 7.3 FL (ref 6.5–11.5)
PMV BLD AUTO: 7.3 FL (ref 6.5–11.5)
PMV BLD AUTO: 7.6 FL (ref 6.5–11.5)
PMV BLD AUTO: 7.9 FL (ref 6.5–11.5)
PMV BLD AUTO: 8.2 FL (ref 6.5–11.5)
PMV BLD AUTO: 9.6 FL (ref 8.9–12.9)
PMV BLD AUTO: 9.8 FL (ref 8.9–12.9)
PMV BLD AUTO: 9.9 FL (ref 8.9–12.9)
PO2 BLD: 42 MMHG (ref 80–100)
PO2 BLD: 48 MMHG (ref 80–100)
PO2 BLD: 69 MMHG (ref 80–100)
PO2 BLD: 82 MMHG (ref 80–100)
POTASSIUM SERPL-SCNC: 3.7 MMOL/L (ref 3.5–5.1)
POTASSIUM SERPL-SCNC: 4.1 MMOL/L (ref 3.5–5.1)
POTASSIUM SERPL-SCNC: 4.2 MMOL/L (ref 3.5–5.1)
POTASSIUM SERPL-SCNC: 4.3 MMOL/L (ref 3.5–5.1)
POTASSIUM SERPL-SCNC: 4.5 MMOL/L (ref 3.5–5.1)
POTASSIUM SERPL-SCNC: 4.5 MMOL/L (ref 3.5–5.1)
POTASSIUM SERPL-SCNC: 4.8 MMOL/L (ref 3.5–5.1)
POTASSIUM SERPL-SCNC: 5 MMOL/L (ref 3.5–5.1)
POTASSIUM SERPL-SCNC: 5.1 MMOL/L (ref 3.5–5.1)
POTASSIUM SERPL-SCNC: 5.2 MMOL/L (ref 3.5–5.1)
POTASSIUM SERPL-SCNC: 5.4 MMOL/L (ref 3.5–5.1)
POTASSIUM SERPL-SCNC: 5.7 MMOL/L (ref 3.5–5.1)
POTASSIUM SERPL-SCNC: 5.8 MMOL/L (ref 3.5–5.1)
POTASSIUM SERPL-SCNC: 6 MMOL/L (ref 3.5–5.1)
POTASSIUM SERPL-SCNC: 6.2 MMOL/L (ref 3.5–5.1)
POTASSIUM SERPL-SCNC: 6.5 MMOL/L (ref 3.5–5.1)
POTASSIUM UR-SCNC: 23 MMOL/L
PROCALCITONIN SERPL-MCNC: 0.05 NG/ML
PROCALCITONIN SERPL-MCNC: 0.06 NG/ML
PROCALCITONIN SERPL-MCNC: 0.07 NG/ML
PROCALCITONIN SERPL-MCNC: 0.5 NG/ML
PROT SERPL-MCNC: 6.1 G/DL (ref 6.4–8.2)
PROT SERPL-MCNC: 6.6 G/DL (ref 6.4–8.2)
PROT SERPL-MCNC: 6.8 G/DL (ref 6.4–8.2)
PROT SERPL-MCNC: 7 G/DL (ref 6.4–8.2)
PROT SERPL-MCNC: 7 G/DL (ref 6.4–8.2)
PROT SERPL-MCNC: 7.2 G/DL (ref 6.4–8.2)
PROT SERPL-MCNC: 7.2 G/DL (ref 6.4–8.2)
PROT SERPL-MCNC: 7.5 G/DL (ref 6.4–8.2)
PROT SERPL-MCNC: 7.5 G/DL (ref 6.4–8.2)
PROT UR STRIP-MCNC: 100 MG/DL
PROT UR-MCNC: 139 MG/DL (ref 0–11.9)
PROT/CREAT UR-RTO: 3.6
PROTHROMBIN TIME: 11.7 SEC (ref 9–11.1)
PROTHROMBIN TIME: 12.6 SEC (ref 9–11.1)
PROTHROMBIN TIME: 14.5 SEC (ref 11.9–14.7)
Q-T INTERVAL, ECG07: 392 MS
Q-T INTERVAL, ECG07: 468 MS
Q-T INTERVAL, ECG07: 469 MS
QRS DURATION, ECG06: 100 MS
QRS DURATION, ECG06: 88 MS
QRS DURATION, ECG06: 92 MS
QTC CALCULATION (BEZET), ECG08: 441 MS
QTC CALCULATION (BEZET), ECG08: 471 MS
QTC CALCULATION (BEZET), ECG08: 488 MS
RBC # BLD AUTO: 3.3 M/UL (ref 3.8–5.2)
RBC # BLD AUTO: 3.36 M/UL (ref 4.5–5.9)
RBC # BLD AUTO: 3.55 M/UL (ref 3.8–5.2)
RBC # BLD AUTO: 3.56 M/UL (ref 3.8–5.2)
RBC # BLD AUTO: 3.57 M/UL (ref 3.8–5.2)
RBC # BLD AUTO: 3.67 M/UL (ref 3.8–5.2)
RBC # BLD AUTO: 3.76 M/UL (ref 3.8–5.2)
RBC # BLD AUTO: 3.9 M/UL (ref 4.5–5.9)
RBC # BLD AUTO: 3.95 M/UL (ref 3.8–5.2)
RBC # BLD AUTO: 3.97 M/UL (ref 4.5–5.9)
RBC # BLD AUTO: 4.03 M/UL (ref 3.8–5.2)
RBC # BLD AUTO: 4.14 M/UL (ref 4.5–5.9)
RBC # BLD AUTO: 4.22 M/UL (ref 3.8–5.2)
RBC # BLD AUTO: 4.61 M/UL (ref 4.5–5.9)
RBC # BLD AUTO: 4.97 M/UL (ref 3.8–5.2)
RBC #/AREA URNS HPF: ABNORMAL /HPF (ref 0–5)
RBC MORPH BLD: ABNORMAL
S PNEUM AG SPEC QL LA: NEGATIVE
SAO2 % BLD: 70 % (ref 92–97)
SAO2 % BLD: 76 % (ref 92–97)
SAO2 % BLD: 92 % (ref 92–97)
SAO2 % BLD: 94 % (ref 92–97)
SARS-COV-2, COV2: DETECTED
SARS-COV-2, COV2: NORMAL
SARS-COV-2, COV2NT: NOT DETECTED
SERVICE CMNT-IMP: ABNORMAL
SERVICE CMNT-IMP: NORMAL
SERVICE CMNT-IMP: NORMAL
SODIUM SERPL-SCNC: 129 MMOL/L (ref 136–145)
SODIUM SERPL-SCNC: 134 MMOL/L (ref 136–145)
SODIUM SERPL-SCNC: 135 MMOL/L (ref 136–145)
SODIUM SERPL-SCNC: 136 MMOL/L (ref 136–145)
SODIUM SERPL-SCNC: 137 MMOL/L (ref 136–145)
SODIUM SERPL-SCNC: 138 MMOL/L (ref 136–145)
SODIUM SERPL-SCNC: 139 MMOL/L (ref 136–145)
SODIUM SERPL-SCNC: 140 MMOL/L (ref 136–145)
SODIUM SERPL-SCNC: 143 MMOL/L (ref 136–145)
SODIUM SERPL-SCNC: 146 MMOL/L (ref 136–145)
SOURCE, COVRS: ABNORMAL
SP GR UR REFRACTOMETRY: 1.01 (ref 1–1.03)
SPECIAL REQUESTS,SREQ: NORMAL
SPECIMEN EXP DATE BLD: NORMAL
SPECIMEN EXP DATE BLD: NORMAL
SPECIMEN SOURCE, FCOV2M: ABNORMAL
SPECIMEN SOURCE: ABNORMAL
SPECIMEN SOURCE: NORMAL
SPECIMEN SOURCE: NORMAL
SPECIMEN TYPE, XMCV1T: ABNORMAL
SPECIMEN TYPE: ABNORMAL
SPECIMEN, SPNG1: NORMAL
STATUS OF UNIT,%ST: NORMAL
STATUS OF UNIT,%ST: NORMAL
TACROLIMUS, UTACRT: 1.1 NG/ML
TACROLIMUS, UTACRT: 1.8 NG/ML
TACROLIMUS, UTACRT: 2.8 NG/ML
TACROLIMUS, UTACRT: 5.2 NG/ML
THERAPEUTIC RANGE,PTTT: ABNORMAL SEC (ref 68–109)
THERAPEUTIC RANGE,PTTT: ABNORMAL SECS (ref 58–77)
TIBC SERPL-MCNC: 206 UG/DL (ref 250–450)
TOTAL RESP. RATE, ITRR: 18
TOTAL RESP. RATE, ITRR: 18
TRANSFUSION STATUS PATIENT QL: NORMAL
TRANSFUSION STATUS PATIENT QL: NORMAL
TROPONIN I SERPL-MCNC: <0.05 NG/ML
TSH SERPL DL<=0.05 MIU/L-ACNC: 3.26 UIU/ML (ref 0.36–3.74)
UA: UC IF INDICATED,UAUC: ABNORMAL
UNIT DIVISION, %UDIV: 0
UNIT DIVISION, %UDIV: 0
URATE SERPL-MCNC: 10.1 MG/DL (ref 2.6–6)
UROBILINOGEN UR QL STRIP.AUTO: 0.1 EU/DL (ref 0.1–1)
VENTRICULAR RATE, ECG03: 61 BPM
VENTRICULAR RATE, ECG03: 65 BPM
VENTRICULAR RATE, ECG03: 76 BPM
WBC # BLD AUTO: 2 K/UL (ref 3.6–11)
WBC # BLD AUTO: 2.1 K/UL (ref 3.6–11)
WBC # BLD AUTO: 3.1 K/UL (ref 3.6–11)
WBC # BLD AUTO: 3.6 K/UL (ref 3.6–11)
WBC # BLD AUTO: 3.8 K/UL (ref 3.6–11)
WBC # BLD AUTO: 4.1 K/UL (ref 3.6–11)
WBC # BLD AUTO: 4.7 K/UL (ref 3.6–11)
WBC # BLD AUTO: 5.1 K/UL (ref 4.4–11.3)
WBC # BLD AUTO: 5.3 K/UL (ref 4.4–11.3)
WBC # BLD AUTO: 5.4 K/UL (ref 4.4–11.3)
WBC # BLD AUTO: 5.8 K/UL (ref 4.4–11.3)
WBC # BLD AUTO: 6 K/UL (ref 4.4–11.3)
WBC # BLD AUTO: 6.2 K/UL (ref 3.6–11)
WBC # BLD AUTO: 8.6 K/UL (ref 3.6–11)
WBC # BLD AUTO: 9.4 K/UL (ref 3.6–11)
WBC URNS QL MICRO: ABNORMAL /HPF (ref 0–4)

## 2020-01-01 PROCEDURE — 74011250636 HC RX REV CODE- 250/636: Performed by: INTERNAL MEDICINE

## 2020-01-01 PROCEDURE — 87635 SARS-COV-2 COVID-19 AMP PRB: CPT

## 2020-01-01 PROCEDURE — 85025 COMPLETE CBC W/AUTO DIFF WBC: CPT

## 2020-01-01 PROCEDURE — 36415 COLL VENOUS BLD VENIPUNCTURE: CPT

## 2020-01-01 PROCEDURE — 74011250636 HC RX REV CODE- 250/636: Performed by: NURSE PRACTITIONER

## 2020-01-01 PROCEDURE — 74011250636 HC RX REV CODE- 250/636: Performed by: EMERGENCY MEDICINE

## 2020-01-01 PROCEDURE — 65270000029 HC RM PRIVATE

## 2020-01-01 PROCEDURE — 87389 HIV-1 AG W/HIV-1&-2 AB AG IA: CPT

## 2020-01-01 PROCEDURE — 82962 GLUCOSE BLOOD TEST: CPT

## 2020-01-01 PROCEDURE — 74011250637 HC RX REV CODE- 250/637: Performed by: HOSPITALIST

## 2020-01-01 PROCEDURE — 87449 NOS EACH ORGANISM AG IA: CPT

## 2020-01-01 PROCEDURE — C9113 INJ PANTOPRAZOLE SODIUM, VIA: HCPCS | Performed by: NURSE PRACTITIONER

## 2020-01-01 PROCEDURE — 92526 ORAL FUNCTION THERAPY: CPT | Performed by: SPEECH-LANGUAGE PATHOLOGIST

## 2020-01-01 PROCEDURE — 80069 RENAL FUNCTION PANEL: CPT

## 2020-01-01 PROCEDURE — 86900 BLOOD TYPING SEROLOGIC ABO: CPT

## 2020-01-01 PROCEDURE — 86713 LEGIONELLA ANTIBODY: CPT

## 2020-01-01 PROCEDURE — 74011000250 HC RX REV CODE- 250: Performed by: NURSE PRACTITIONER

## 2020-01-01 PROCEDURE — 71045 X-RAY EXAM CHEST 1 VIEW: CPT

## 2020-01-01 PROCEDURE — 36600 WITHDRAWAL OF ARTERIAL BLOOD: CPT

## 2020-01-01 PROCEDURE — 80197 ASSAY OF TACROLIMUS: CPT

## 2020-01-01 PROCEDURE — 94760 N-INVAS EAR/PLS OXIMETRY 1: CPT

## 2020-01-01 PROCEDURE — 85610 PROTHROMBIN TIME: CPT

## 2020-01-01 PROCEDURE — 86704 HEP B CORE ANTIBODY TOTAL: CPT

## 2020-01-01 PROCEDURE — 74011636637 HC RX REV CODE- 636/637: Performed by: HOSPITALIST

## 2020-01-01 PROCEDURE — 82728 ASSAY OF FERRITIN: CPT

## 2020-01-01 PROCEDURE — P9016 RBC LEUKOCYTES REDUCED: HCPCS

## 2020-01-01 PROCEDURE — 74011250637 HC RX REV CODE- 250/637: Performed by: INTERNAL MEDICINE

## 2020-01-01 PROCEDURE — 77010033678 HC OXYGEN DAILY

## 2020-01-01 PROCEDURE — 99285 EMERGENCY DEPT VISIT HI MDM: CPT

## 2020-01-01 PROCEDURE — 83540 ASSAY OF IRON: CPT

## 2020-01-01 PROCEDURE — 83690 ASSAY OF LIPASE: CPT

## 2020-01-01 PROCEDURE — 36430 TRANSFUSION BLD/BLD COMPNT: CPT

## 2020-01-01 PROCEDURE — 80053 COMPREHEN METABOLIC PANEL: CPT

## 2020-01-01 PROCEDURE — 86140 C-REACTIVE PROTEIN: CPT

## 2020-01-01 PROCEDURE — 86803 HEPATITIS C AB TEST: CPT

## 2020-01-01 PROCEDURE — 74011000258 HC RX REV CODE- 258: Performed by: HOSPITALIST

## 2020-01-01 PROCEDURE — 74011000250 HC RX REV CODE- 250: Performed by: INTERNAL MEDICINE

## 2020-01-01 PROCEDURE — 85379 FIBRIN DEGRADATION QUANT: CPT

## 2020-01-01 PROCEDURE — 71046 X-RAY EXAM CHEST 2 VIEWS: CPT

## 2020-01-01 PROCEDURE — 96374 THER/PROPH/DIAG INJ IV PUSH: CPT

## 2020-01-01 PROCEDURE — 74011250636 HC RX REV CODE- 250/636: Performed by: OTOLARYNGOLOGY

## 2020-01-01 PROCEDURE — 31500 INSERT EMERGENCY AIRWAY: CPT

## 2020-01-01 PROCEDURE — 87040 BLOOD CULTURE FOR BACTERIA: CPT

## 2020-01-01 PROCEDURE — 84133 ASSAY OF URINE POTASSIUM: CPT

## 2020-01-01 PROCEDURE — 65610000006 HC RM INTENSIVE CARE

## 2020-01-01 PROCEDURE — 74011250637 HC RX REV CODE- 250/637: Performed by: NURSE PRACTITIONER

## 2020-01-01 PROCEDURE — 74011000250 HC RX REV CODE- 250: Performed by: NURSE ANESTHETIST, CERTIFIED REGISTERED

## 2020-01-01 PROCEDURE — 83605 ASSAY OF LACTIC ACID: CPT

## 2020-01-01 PROCEDURE — 99283 EMERGENCY DEPT VISIT LOW MDM: CPT

## 2020-01-01 PROCEDURE — 65660000000 HC RM CCU STEPDOWN

## 2020-01-01 PROCEDURE — 84100 ASSAY OF PHOSPHORUS: CPT

## 2020-01-01 PROCEDURE — 84550 ASSAY OF BLOOD/URIC ACID: CPT

## 2020-01-01 PROCEDURE — 87147 CULTURE TYPE IMMUNOLOGIC: CPT

## 2020-01-01 PROCEDURE — 90686 IIV4 VACC NO PRSV 0.5 ML IM: CPT | Performed by: INTERNAL MEDICINE

## 2020-01-01 PROCEDURE — 97530 THERAPEUTIC ACTIVITIES: CPT

## 2020-01-01 PROCEDURE — 97116 GAIT TRAINING THERAPY: CPT

## 2020-01-01 PROCEDURE — 74011250636 HC RX REV CODE- 250/636: Performed by: HOSPITALIST

## 2020-01-01 PROCEDURE — 74011250637 HC RX REV CODE- 250/637: Performed by: PHYSICIAN ASSISTANT

## 2020-01-01 PROCEDURE — 94640 AIRWAY INHALATION TREATMENT: CPT

## 2020-01-01 PROCEDURE — 70490 CT SOFT TISSUE NECK W/O DYE: CPT

## 2020-01-01 PROCEDURE — 5A1935Z RESPIRATORY VENTILATION, LESS THAN 24 CONSECUTIVE HOURS: ICD-10-PCS | Performed by: HOSPITALIST

## 2020-01-01 PROCEDURE — 65270000032 HC RM SEMIPRIVATE

## 2020-01-01 PROCEDURE — 88365 INSITU HYBRIDIZATION (FISH): CPT

## 2020-01-01 PROCEDURE — 87340 HEPATITIS B SURFACE AG IA: CPT

## 2020-01-01 PROCEDURE — 84484 ASSAY OF TROPONIN QUANT: CPT

## 2020-01-01 PROCEDURE — 74011250637 HC RX REV CODE- 250/637: Performed by: ANESTHESIOLOGY

## 2020-01-01 PROCEDURE — 97165 OT EVAL LOW COMPLEX 30 MIN: CPT

## 2020-01-01 PROCEDURE — 86706 HEP B SURFACE ANTIBODY: CPT

## 2020-01-01 PROCEDURE — 74011636637 HC RX REV CODE- 636/637: Performed by: INTERNAL MEDICINE

## 2020-01-01 PROCEDURE — 83935 ASSAY OF URINE OSMOLALITY: CPT

## 2020-01-01 PROCEDURE — 80048 BASIC METABOLIC PNL TOTAL CA: CPT

## 2020-01-01 PROCEDURE — 83735 ASSAY OF MAGNESIUM: CPT

## 2020-01-01 PROCEDURE — 99223 1ST HOSP IP/OBS HIGH 75: CPT | Performed by: NURSE PRACTITIONER

## 2020-01-01 PROCEDURE — 87804 INFLUENZA ASSAY W/OPTIC: CPT

## 2020-01-01 PROCEDURE — 87899 AGENT NOS ASSAY W/OPTIC: CPT

## 2020-01-01 PROCEDURE — 88342 IMHCHEM/IMCYTCHM 1ST ANTB: CPT

## 2020-01-01 PROCEDURE — 82570 ASSAY OF URINE CREATININE: CPT

## 2020-01-01 PROCEDURE — 74011000250 HC RX REV CODE- 250: Performed by: HOSPITALIST

## 2020-01-01 PROCEDURE — 74011000258 HC RX REV CODE- 258: Performed by: INTERNAL MEDICINE

## 2020-01-01 PROCEDURE — 96375 TX/PRO/DX INJ NEW DRUG ADDON: CPT

## 2020-01-01 PROCEDURE — 88341 IMHCHEM/IMCYTCHM EA ADD ANTB: CPT

## 2020-01-01 PROCEDURE — 70450 CT HEAD/BRAIN W/O DYE: CPT

## 2020-01-01 PROCEDURE — 74011636637 HC RX REV CODE- 636/637: Performed by: NURSE PRACTITIONER

## 2020-01-01 PROCEDURE — 83880 ASSAY OF NATRIURETIC PEPTIDE: CPT

## 2020-01-01 PROCEDURE — 51798 US URINE CAPACITY MEASURE: CPT

## 2020-01-01 PROCEDURE — 10021 FNA BX W/O IMG GDN 1ST LES: CPT

## 2020-01-01 PROCEDURE — 84156 ASSAY OF PROTEIN URINE: CPT

## 2020-01-01 PROCEDURE — C9113 INJ PANTOPRAZOLE SODIUM, VIA: HCPCS | Performed by: HOSPITALIST

## 2020-01-01 PROCEDURE — 92610 EVALUATE SWALLOWING FUNCTION: CPT

## 2020-01-01 PROCEDURE — 99284 EMERGENCY DEPT VISIT MOD MDM: CPT

## 2020-01-01 PROCEDURE — 86738 MYCOPLASMA ANTIBODY: CPT

## 2020-01-01 PROCEDURE — 74011000258 HC RX REV CODE- 258: Performed by: NURSE PRACTITIONER

## 2020-01-01 PROCEDURE — 71250 CT THORAX DX C-: CPT

## 2020-01-01 PROCEDURE — 83615 LACTATE (LD) (LDH) ENZYME: CPT

## 2020-01-01 PROCEDURE — 76040000007: Performed by: INTERNAL MEDICINE

## 2020-01-01 PROCEDURE — 87880 STREP A ASSAY W/OPTIC: CPT

## 2020-01-01 PROCEDURE — 85730 THROMBOPLASTIN TIME PARTIAL: CPT

## 2020-01-01 PROCEDURE — 99356 PR PROLONGED SVC I/P OR OBS SETTING 1ST HOUR: CPT | Performed by: NURSE PRACTITIONER

## 2020-01-01 PROCEDURE — 86870 RBC ANTIBODY IDENTIFICATION: CPT

## 2020-01-01 PROCEDURE — 82803 BLOOD GASES ANY COMBINATION: CPT

## 2020-01-01 PROCEDURE — 93005 ELECTROCARDIOGRAM TRACING: CPT

## 2020-01-01 PROCEDURE — 82043 UR ALBUMIN QUANTITATIVE: CPT

## 2020-01-01 PROCEDURE — 76060000032 HC ANESTHESIA 0.5 TO 1 HR: Performed by: INTERNAL MEDICINE

## 2020-01-01 PROCEDURE — 0107U C DIFF TOX AG DETCJ IA STOOL: CPT

## 2020-01-01 PROCEDURE — 77030040361 HC SLV COMPR DVT MDII -B

## 2020-01-01 PROCEDURE — 74011250636 HC RX REV CODE- 250/636

## 2020-01-01 PROCEDURE — 93306 TTE W/DOPPLER COMPLETE: CPT

## 2020-01-01 PROCEDURE — 82436 ASSAY OF URINE CHLORIDE: CPT

## 2020-01-01 PROCEDURE — 86632 CHLAMYDIA IGM ANTIBODY: CPT

## 2020-01-01 PROCEDURE — 94762 N-INVAS EAR/PLS OXIMTRY CONT: CPT

## 2020-01-01 PROCEDURE — 88305 TISSUE EXAM BY PATHOLOGIST: CPT

## 2020-01-01 PROCEDURE — 0BH17EZ INSERTION OF ENDOTRACHEAL AIRWAY INTO TRACHEA, VIA NATURAL OR ARTIFICIAL OPENING: ICD-10-PCS | Performed by: INTERNAL MEDICINE

## 2020-01-01 PROCEDURE — 65660000001 HC RM ICU INTERMED STEPDOWN

## 2020-01-01 PROCEDURE — 74011250636 HC RX REV CODE- 250/636: Performed by: NURSE ANESTHETIST, CERTIFIED REGISTERED

## 2020-01-01 PROCEDURE — 74011250637 HC RX REV CODE- 250/637: Performed by: EMERGENCY MEDICINE

## 2020-01-01 PROCEDURE — 87493 C DIFF AMPLIFIED PROBE: CPT

## 2020-01-01 PROCEDURE — 77030019988 HC FCPS ENDOSC DISP BSC -B: Performed by: INTERNAL MEDICINE

## 2020-01-01 PROCEDURE — 84145 PROCALCITONIN (PCT): CPT

## 2020-01-01 PROCEDURE — 2709999900 HC NON-CHARGEABLE SUPPLY: Performed by: INTERNAL MEDICINE

## 2020-01-01 PROCEDURE — 77030021593 HC FCPS BIOP ENDOSC BSC -A: Performed by: INTERNAL MEDICINE

## 2020-01-01 PROCEDURE — 0CBPXZX EXCISION OF TONSILS, EXTERNAL APPROACH, DIAGNOSTIC: ICD-10-PCS | Performed by: OTOLARYNGOLOGY

## 2020-01-01 PROCEDURE — 74011000258 HC RX REV CODE- 258: Performed by: EMERGENCY MEDICINE

## 2020-01-01 PROCEDURE — 81001 URINALYSIS AUTO W/SCOPE: CPT

## 2020-01-01 PROCEDURE — 88360 TUMOR IMMUNOHISTOCHEM/MANUAL: CPT

## 2020-01-01 PROCEDURE — 88374 M/PHMTRC ALYS ISHQUANT/SEMIQ: CPT

## 2020-01-01 PROCEDURE — 86920 COMPATIBILITY TEST SPIN: CPT

## 2020-01-01 PROCEDURE — 74018 RADEX ABDOMEN 1 VIEW: CPT

## 2020-01-01 PROCEDURE — A9552 F18 FDG: HCPCS

## 2020-01-01 PROCEDURE — 84443 ASSAY THYROID STIM HORMONE: CPT

## 2020-01-01 PROCEDURE — 97161 PT EVAL LOW COMPLEX 20 MIN: CPT

## 2020-01-01 PROCEDURE — 83036 HEMOGLOBIN GLYCOSYLATED A1C: CPT

## 2020-01-01 PROCEDURE — 94002 VENT MGMT INPAT INIT DAY: CPT

## 2020-01-01 PROCEDURE — 90471 IMMUNIZATION ADMIN: CPT

## 2020-01-01 RX ORDER — INSULIN LISPRO 100 [IU]/ML
INJECTION, SOLUTION INTRAVENOUS; SUBCUTANEOUS EVERY 6 HOURS
Status: DISCONTINUED | OUTPATIENT
Start: 2020-01-01 | End: 2020-01-01 | Stop reason: SDUPTHER

## 2020-01-01 RX ORDER — GEMFIBROZIL 600 MG/1
600 TABLET, FILM COATED ORAL DAILY
Status: CANCELLED | OUTPATIENT
Start: 2020-01-01

## 2020-01-01 RX ORDER — ENOXAPARIN SODIUM 100 MG/ML
30 INJECTION SUBCUTANEOUS EVERY 24 HOURS
Status: DISCONTINUED | OUTPATIENT
Start: 2020-01-01 | End: 2020-01-01

## 2020-01-01 RX ORDER — ZINC SULFATE 50(220)MG
1 CAPSULE ORAL DAILY
Status: DISCONTINUED | OUTPATIENT
Start: 2020-01-01 | End: 2020-01-01

## 2020-01-01 RX ORDER — CALCITRIOL 0.25 UG/1
0.25 CAPSULE ORAL DAILY
Status: DISCONTINUED | OUTPATIENT
Start: 2020-01-01 | End: 2020-01-01 | Stop reason: HOSPADM

## 2020-01-01 RX ORDER — ALBUTEROL SULFATE 0.83 MG/ML
2.5 SOLUTION RESPIRATORY (INHALATION) AS NEEDED
Status: CANCELLED
Start: 2020-01-01

## 2020-01-01 RX ORDER — FLUCONAZOLE 50 MG/1
100 TABLET ORAL DAILY
Qty: 28 TAB | Refills: 0 | Status: SHIPPED | OUTPATIENT
Start: 2020-01-01 | End: 2020-01-01

## 2020-01-01 RX ORDER — ROCURONIUM BROMIDE 10 MG/ML
100 INJECTION, SOLUTION INTRAVENOUS
Status: DISCONTINUED | OUTPATIENT
Start: 2020-01-01 | End: 2020-01-01

## 2020-01-01 RX ORDER — POLYETHYLENE GLYCOL 3350 17 G/17G
17 POWDER, FOR SOLUTION ORAL DAILY PRN
Status: DISCONTINUED | OUTPATIENT
Start: 2020-01-01 | End: 2020-01-01 | Stop reason: HOSPADM

## 2020-01-01 RX ORDER — PANTOPRAZOLE SODIUM 40 MG/1
40 TABLET, DELAYED RELEASE ORAL
Status: DISCONTINUED | OUTPATIENT
Start: 2020-01-01 | End: 2020-01-01

## 2020-01-01 RX ORDER — NORETHINDRONE AND ETHINYL ESTRADIOL 0.5-0.035
KIT ORAL
Status: DISCONTINUED
Start: 2020-01-01 | End: 2020-01-01 | Stop reason: HOSPADM

## 2020-01-01 RX ORDER — FUROSEMIDE 40 MG/1
80 TABLET ORAL
Status: CANCELLED | OUTPATIENT
Start: 2020-01-01

## 2020-01-01 RX ORDER — LORAZEPAM 2 MG/ML
1 INJECTION INTRAMUSCULAR
Status: DISCONTINUED | OUTPATIENT
Start: 2020-01-01 | End: 2020-12-02 | Stop reason: HOSPADM

## 2020-01-01 RX ORDER — GLYCOPYRROLATE 0.2 MG/ML
0.2 INJECTION INTRAMUSCULAR; INTRAVENOUS
Status: DISCONTINUED | OUTPATIENT
Start: 2020-01-01 | End: 2020-12-02 | Stop reason: HOSPADM

## 2020-01-01 RX ORDER — SCOLOPAMINE TRANSDERMAL SYSTEM 1 MG/1
1 PATCH, EXTENDED RELEASE TRANSDERMAL
Status: DISCONTINUED | OUTPATIENT
Start: 2020-01-01 | End: 2020-12-02 | Stop reason: HOSPADM

## 2020-01-01 RX ORDER — INSULIN LISPRO 100 [IU]/ML
INJECTION, SOLUTION INTRAVENOUS; SUBCUTANEOUS
Status: DISCONTINUED | OUTPATIENT
Start: 2020-01-01 | End: 2020-01-01 | Stop reason: HOSPADM

## 2020-01-01 RX ORDER — SODIUM BICARBONATE 650 MG/1
1300 TABLET ORAL 4 TIMES DAILY
Status: CANCELLED | OUTPATIENT
Start: 2020-01-01

## 2020-01-01 RX ORDER — SODIUM CHLORIDE 0.9 % (FLUSH) 0.9 %
5-40 SYRINGE (ML) INJECTION EVERY 8 HOURS
Status: DISCONTINUED | OUTPATIENT
Start: 2020-01-01 | End: 2020-01-01 | Stop reason: HOSPADM

## 2020-01-01 RX ORDER — MYCOPHENOLATE MOFETIL 250 MG/1
250 CAPSULE ORAL
Qty: 30 CAP | Refills: 0 | Status: ON HOLD | OUTPATIENT
Start: 2020-01-01 | End: 2020-01-01 | Stop reason: DRUGHIGH

## 2020-01-01 RX ORDER — SODIUM CHLORIDE 0.9 % (FLUSH) 0.9 %
5-40 SYRINGE (ML) INJECTION EVERY 8 HOURS
Status: DISCONTINUED | OUTPATIENT
Start: 2020-01-01 | End: 2020-01-01

## 2020-01-01 RX ORDER — HYDRALAZINE HYDROCHLORIDE 20 MG/ML
10 INJECTION INTRAMUSCULAR; INTRAVENOUS
Status: DISCONTINUED | OUTPATIENT
Start: 2020-01-01 | End: 2020-01-01 | Stop reason: HOSPADM

## 2020-01-01 RX ORDER — SODIUM CHLORIDE 9 MG/ML
250 INJECTION, SOLUTION INTRAVENOUS AS NEEDED
Status: DISCONTINUED | OUTPATIENT
Start: 2020-01-01 | End: 2020-01-01

## 2020-01-01 RX ORDER — DEXAMETHASONE SODIUM PHOSPHATE 4 MG/ML
4 INJECTION, SOLUTION INTRA-ARTICULAR; INTRALESIONAL; INTRAMUSCULAR; INTRAVENOUS; SOFT TISSUE EVERY 6 HOURS
Status: DISCONTINUED | OUTPATIENT
Start: 2020-01-01 | End: 2020-01-01

## 2020-01-01 RX ORDER — LABETALOL HYDROCHLORIDE 5 MG/ML
20 INJECTION, SOLUTION INTRAVENOUS
Status: DISCONTINUED | OUTPATIENT
Start: 2020-01-01 | End: 2020-12-02 | Stop reason: HOSPADM

## 2020-01-01 RX ORDER — ALBUTEROL SULFATE 0.83 MG/ML
10 SOLUTION RESPIRATORY (INHALATION)
Status: DISCONTINUED | OUTPATIENT
Start: 2020-01-01 | End: 2020-01-01 | Stop reason: ALTCHOICE

## 2020-01-01 RX ORDER — PREDNISONE 5 MG/1
7.5 TABLET ORAL
Status: DISCONTINUED | OUTPATIENT
Start: 2020-01-01 | End: 2020-01-01 | Stop reason: HOSPADM

## 2020-01-01 RX ORDER — TACROLIMUS 1 MG/1
1 CAPSULE ORAL EVERY 12 HOURS
Status: DISCONTINUED | OUTPATIENT
Start: 2020-01-01 | End: 2020-01-01

## 2020-01-01 RX ORDER — PHENYLEPHRINE HCL IN 0.9% NACL 1 MG/10 ML
SYRINGE (ML) INTRAVENOUS
Status: DISCONTINUED
Start: 2020-01-01 | End: 2020-01-01 | Stop reason: HOSPADM

## 2020-01-01 RX ORDER — ACETAMINOPHEN 325 MG/1
650 TABLET ORAL AS NEEDED
Status: CANCELLED
Start: 2020-01-01

## 2020-01-01 RX ORDER — MYCOPHENOLATE MOFETIL 250 MG/1
500 CAPSULE ORAL 2 TIMES DAILY
Status: DISCONTINUED | OUTPATIENT
Start: 2020-01-01 | End: 2020-01-01

## 2020-01-01 RX ORDER — HYDROMORPHONE HYDROCHLORIDE 1 MG/ML
0.5 INJECTION, SOLUTION INTRAMUSCULAR; INTRAVENOUS; SUBCUTANEOUS
Status: DISCONTINUED | OUTPATIENT
Start: 2020-01-01 | End: 2020-12-02 | Stop reason: HOSPADM

## 2020-01-01 RX ORDER — LANOLIN ALCOHOL/MO/W.PET/CERES
3 CREAM (GRAM) TOPICAL
Status: DISCONTINUED | OUTPATIENT
Start: 2020-01-01 | End: 2020-01-01

## 2020-01-01 RX ORDER — SODIUM BICARBONATE 650 MG/1
1300 TABLET ORAL 4 TIMES DAILY
COMMUNITY

## 2020-01-01 RX ORDER — INSULIN LISPRO 100 [IU]/ML
8 INJECTION, SOLUTION INTRAVENOUS; SUBCUTANEOUS ONCE
Status: COMPLETED | OUTPATIENT
Start: 2020-01-01 | End: 2020-01-01

## 2020-01-01 RX ORDER — OXYCODONE HYDROCHLORIDE 5 MG/1
5 TABLET ORAL
Status: DISCONTINUED | OUTPATIENT
Start: 2020-01-01 | End: 2020-01-01

## 2020-01-01 RX ORDER — HYDRALAZINE HYDROCHLORIDE 20 MG/ML
10 INJECTION INTRAMUSCULAR; INTRAVENOUS
Status: DISCONTINUED | OUTPATIENT
Start: 2020-01-01 | End: 2020-01-01

## 2020-01-01 RX ORDER — DEXTROSE MONOHYDRATE 100 MG/ML
250 INJECTION, SOLUTION INTRAVENOUS ONCE
Status: COMPLETED | OUTPATIENT
Start: 2020-01-01 | End: 2020-01-01

## 2020-01-01 RX ORDER — HYDRALAZINE HYDROCHLORIDE 25 MG/1
25 TABLET, FILM COATED ORAL 3 TIMES DAILY
Status: DISCONTINUED | OUTPATIENT
Start: 2020-01-01 | End: 2020-01-01

## 2020-01-01 RX ORDER — AMLODIPINE BESYLATE 5 MG/1
10 TABLET ORAL DAILY
Status: DISCONTINUED | OUTPATIENT
Start: 2020-01-01 | End: 2020-01-01

## 2020-01-01 RX ORDER — METOPROLOL TARTRATE 50 MG/1
50 TABLET ORAL 2 TIMES DAILY
Status: CANCELLED | OUTPATIENT
Start: 2020-01-01

## 2020-01-01 RX ORDER — ETOMIDATE 2 MG/ML
INJECTION INTRAVENOUS
Status: DISCONTINUED
Start: 2020-01-01 | End: 2020-01-01

## 2020-01-01 RX ORDER — METRONIDAZOLE 500 MG/100ML
500 INJECTION, SOLUTION INTRAVENOUS EVERY 8 HOURS
Status: DISCONTINUED | OUTPATIENT
Start: 2020-01-01 | End: 2020-01-01

## 2020-01-01 RX ORDER — ACETAMINOPHEN 500 MG
TABLET ORAL 2 TIMES DAILY
COMMUNITY

## 2020-01-01 RX ORDER — SODIUM CHLORIDE 9 MG/ML
10 INJECTION INTRAMUSCULAR; INTRAVENOUS; SUBCUTANEOUS AS NEEDED
Status: CANCELLED | OUTPATIENT
Start: 2020-01-01

## 2020-01-01 RX ORDER — SODIUM CHLORIDE 0.9 % (FLUSH) 0.9 %
10 SYRINGE (ML) INJECTION AS NEEDED
Status: CANCELLED | OUTPATIENT
Start: 2020-01-01

## 2020-01-01 RX ORDER — INSULIN LISPRO 100 [IU]/ML
5 INJECTION, SOLUTION INTRAVENOUS; SUBCUTANEOUS
COMMUNITY

## 2020-01-01 RX ORDER — METOPROLOL SUCCINATE 50 MG/1
50 TABLET, EXTENDED RELEASE ORAL DAILY
Status: DISCONTINUED | OUTPATIENT
Start: 2020-01-01 | End: 2020-01-01 | Stop reason: SDUPTHER

## 2020-01-01 RX ORDER — LIDOCAINE HYDROCHLORIDE 20 MG/ML
INJECTION, SOLUTION EPIDURAL; INFILTRATION; INTRACAUDAL; PERINEURAL
Status: COMPLETED
Start: 2020-01-01 | End: 2020-01-01

## 2020-01-01 RX ORDER — PANTOPRAZOLE SODIUM 40 MG/1
40 TABLET, DELAYED RELEASE ORAL
Status: DISCONTINUED | OUTPATIENT
Start: 2020-01-01 | End: 2020-01-01 | Stop reason: HOSPADM

## 2020-01-01 RX ORDER — MAGNESIUM SULFATE 100 %
4 CRYSTALS MISCELLANEOUS AS NEEDED
Status: DISCONTINUED | OUTPATIENT
Start: 2020-01-01 | End: 2020-01-01 | Stop reason: HOSPADM

## 2020-01-01 RX ORDER — SODIUM CHLORIDE 0.9 % (FLUSH) 0.9 %
5-40 SYRINGE (ML) INJECTION AS NEEDED
Status: DISCONTINUED | OUTPATIENT
Start: 2020-01-01 | End: 2020-01-01 | Stop reason: HOSPADM

## 2020-01-01 RX ORDER — TACROLIMUS 1 MG/1
CAPSULE ORAL EVERY 12 HOURS
COMMUNITY
End: 2020-01-01 | Stop reason: CLARIF

## 2020-01-01 RX ORDER — TACROLIMUS 1 MG/1
1 CAPSULE ORAL EVERY 12 HOURS
Status: DISCONTINUED | OUTPATIENT
Start: 2020-01-01 | End: 2020-01-01 | Stop reason: HOSPADM

## 2020-01-01 RX ORDER — GEMFIBROZIL 600 MG/1
600 TABLET, FILM COATED ORAL DAILY
Status: DISCONTINUED | OUTPATIENT
Start: 2020-01-01 | End: 2020-01-01 | Stop reason: HOSPADM

## 2020-01-01 RX ORDER — INSULIN LISPRO 100 [IU]/ML
INJECTION, SOLUTION INTRAVENOUS; SUBCUTANEOUS
Status: DISCONTINUED | OUTPATIENT
Start: 2020-01-01 | End: 2020-01-01

## 2020-01-01 RX ORDER — AMLODIPINE BESYLATE 5 MG/1
10 TABLET ORAL DAILY
Status: DISCONTINUED | OUTPATIENT
Start: 2020-01-01 | End: 2020-01-01 | Stop reason: HOSPADM

## 2020-01-01 RX ORDER — SODIUM CHLORIDE 9 MG/ML
65 INJECTION, SOLUTION INTRAVENOUS CONTINUOUS
Status: DISCONTINUED | OUTPATIENT
Start: 2020-01-01 | End: 2020-01-01

## 2020-01-01 RX ORDER — ALLOPURINOL 100 MG/1
100 TABLET ORAL 2 TIMES DAILY
Status: DISCONTINUED | OUTPATIENT
Start: 2020-01-01 | End: 2020-01-01

## 2020-01-01 RX ORDER — ACETAMINOPHEN 325 MG/1
650 TABLET ORAL
Status: DISCONTINUED | OUTPATIENT
Start: 2020-01-01 | End: 2020-01-01 | Stop reason: SDUPTHER

## 2020-01-01 RX ORDER — HYDRALAZINE HYDROCHLORIDE 50 MG/1
100 TABLET, FILM COATED ORAL 3 TIMES DAILY
Status: DISCONTINUED | OUTPATIENT
Start: 2020-01-01 | End: 2020-01-01

## 2020-01-01 RX ORDER — MYCOPHENOLIC ACID 360 MG/1
360 TABLET, DELAYED RELEASE ORAL 2 TIMES DAILY
Status: DISCONTINUED | OUTPATIENT
Start: 2020-01-01 | End: 2020-01-01 | Stop reason: HOSPADM

## 2020-01-01 RX ORDER — CLONIDINE HYDROCHLORIDE 0.1 MG/1
0.1 TABLET ORAL 2 TIMES DAILY
Status: DISCONTINUED | OUTPATIENT
Start: 2020-01-01 | End: 2020-01-01

## 2020-01-01 RX ORDER — MYCOPHENOLATE MOFETIL 250 MG/1
250 CAPSULE ORAL
Status: DISCONTINUED | OUTPATIENT
Start: 2020-01-01 | End: 2020-01-01

## 2020-01-01 RX ORDER — DIPHENHYDRAMINE HYDROCHLORIDE 50 MG/ML
25 INJECTION, SOLUTION INTRAMUSCULAR; INTRAVENOUS AS NEEDED
Status: CANCELLED
Start: 2020-01-01

## 2020-01-01 RX ORDER — HEPARIN 100 UNIT/ML
300-500 SYRINGE INTRAVENOUS AS NEEDED
Status: DISPENSED | OUTPATIENT
Start: 2020-01-01 | End: 2020-01-01

## 2020-01-01 RX ORDER — SODIUM CHLORIDE 9 MG/ML
50 INJECTION, SOLUTION INTRAVENOUS CONTINUOUS
Status: DISCONTINUED | OUTPATIENT
Start: 2020-01-01 | End: 2020-01-01 | Stop reason: HOSPADM

## 2020-01-01 RX ORDER — ONDANSETRON 4 MG/1
4 TABLET, ORALLY DISINTEGRATING ORAL
Status: DISCONTINUED | OUTPATIENT
Start: 2020-01-01 | End: 2020-01-01 | Stop reason: HOSPADM

## 2020-01-01 RX ORDER — SODIUM BICARBONATE 1 MEQ/ML
SYRINGE (ML) INTRAVENOUS
Status: COMPLETED | OUTPATIENT
Start: 2020-01-01 | End: 2020-01-01

## 2020-01-01 RX ORDER — MIDAZOLAM HYDROCHLORIDE 1 MG/ML
INJECTION, SOLUTION INTRAMUSCULAR; INTRAVENOUS
Status: DISCONTINUED
Start: 2020-01-01 | End: 2020-01-01

## 2020-01-01 RX ORDER — DEXTROSE MONOHYDRATE 100 MG/ML
0-250 INJECTION, SOLUTION INTRAVENOUS AS NEEDED
Status: DISCONTINUED | OUTPATIENT
Start: 2020-01-01 | End: 2020-01-01

## 2020-01-01 RX ORDER — NOREPINEPHRINE BITARTRATE/D5W 8 MG/250ML
.5-16 PLASTIC BAG, INJECTION (ML) INTRAVENOUS
Status: DISCONTINUED | OUTPATIENT
Start: 2020-01-01 | End: 2020-12-02 | Stop reason: HOSPADM

## 2020-01-01 RX ORDER — HYDRALAZINE HYDROCHLORIDE 50 MG/1
50 TABLET, FILM COATED ORAL 3 TIMES DAILY
Status: DISCONTINUED | OUTPATIENT
Start: 2020-01-01 | End: 2020-01-01

## 2020-01-01 RX ORDER — ISOSORBIDE MONONITRATE 60 MG/1
60 TABLET, EXTENDED RELEASE ORAL
Status: DISCONTINUED | OUTPATIENT
Start: 2020-01-01 | End: 2020-01-01 | Stop reason: HOSPADM

## 2020-01-01 RX ORDER — ACETAMINOPHEN 650 MG/1
650 SUPPOSITORY RECTAL
Status: DISCONTINUED | OUTPATIENT
Start: 2020-01-01 | End: 2020-01-01 | Stop reason: HOSPADM

## 2020-01-01 RX ORDER — LANOLIN ALCOHOL/MO/W.PET/CERES
3 CREAM (GRAM) TOPICAL
COMMUNITY

## 2020-01-01 RX ORDER — PROPOFOL 10 MG/ML
INJECTION, EMULSION INTRAVENOUS AS NEEDED
Status: DISCONTINUED | OUTPATIENT
Start: 2020-01-01 | End: 2020-01-01 | Stop reason: HOSPADM

## 2020-01-01 RX ORDER — FUROSEMIDE 40 MG/1
40 TABLET ORAL
Status: CANCELLED | OUTPATIENT
Start: 2020-01-01

## 2020-01-01 RX ORDER — HYDRALAZINE HYDROCHLORIDE 20 MG/ML
10 INJECTION INTRAMUSCULAR; INTRAVENOUS ONCE
Status: COMPLETED | OUTPATIENT
Start: 2020-01-01 | End: 2020-01-01

## 2020-01-01 RX ORDER — INSULIN GLARGINE 100 [IU]/ML
7 INJECTION, SOLUTION SUBCUTANEOUS DAILY
Status: DISCONTINUED | OUTPATIENT
Start: 2020-01-01 | End: 2020-01-01

## 2020-01-01 RX ORDER — MYCOPHENOLATE MOFETIL 250 MG/1
250 CAPSULE ORAL
Status: DISCONTINUED | OUTPATIENT
Start: 2020-01-01 | End: 2020-01-01 | Stop reason: HOSPADM

## 2020-01-01 RX ORDER — NYSTATIN 100000 [USP'U]/ML
500000 SUSPENSION ORAL 4 TIMES DAILY
Status: DISCONTINUED | OUTPATIENT
Start: 2020-01-01 | End: 2020-01-01

## 2020-01-01 RX ORDER — LIDOCAINE HYDROCHLORIDE AND EPINEPHRINE 10; 10 MG/ML; UG/ML
20 INJECTION, SOLUTION INFILTRATION; PERINEURAL ONCE
Status: ACTIVE | OUTPATIENT
Start: 2020-01-01 | End: 2020-01-01

## 2020-01-01 RX ORDER — ACETAMINOPHEN 325 MG/1
650 TABLET ORAL
Status: DISCONTINUED | OUTPATIENT
Start: 2020-01-01 | End: 2020-12-02 | Stop reason: HOSPADM

## 2020-01-01 RX ORDER — DIPHENHYDRAMINE HYDROCHLORIDE 50 MG/ML
50 INJECTION, SOLUTION INTRAMUSCULAR; INTRAVENOUS ONCE
Status: CANCELLED
Start: 2020-01-01

## 2020-01-01 RX ORDER — ASCORBIC ACID 500 MG
500 TABLET ORAL 2 TIMES DAILY
Status: DISCONTINUED | OUTPATIENT
Start: 2020-01-01 | End: 2020-01-01

## 2020-01-01 RX ORDER — LINEZOLID 2 MG/ML
600 INJECTION, SOLUTION INTRAVENOUS EVERY 12 HOURS
Status: DISCONTINUED | OUTPATIENT
Start: 2020-01-01 | End: 2020-01-01

## 2020-01-01 RX ORDER — FENTANYL CITRATE 50 UG/ML
INJECTION, SOLUTION INTRAMUSCULAR; INTRAVENOUS
Status: DISCONTINUED
Start: 2020-01-01 | End: 2020-01-01

## 2020-01-01 RX ORDER — ETOMIDATE 2 MG/ML
10 INJECTION INTRAVENOUS ONCE
Status: DISCONTINUED | OUTPATIENT
Start: 2020-01-01 | End: 2020-01-01

## 2020-01-01 RX ORDER — DEXTROSE MONOHYDRATE 100 MG/ML
0-250 INJECTION, SOLUTION INTRAVENOUS AS NEEDED
Status: DISCONTINUED | OUTPATIENT
Start: 2020-01-01 | End: 2020-01-01 | Stop reason: SDUPTHER

## 2020-01-01 RX ORDER — ALBUTEROL SULFATE 90 UG/1
2 AEROSOL, METERED RESPIRATORY (INHALATION)
Status: DISCONTINUED | OUTPATIENT
Start: 2020-01-01 | End: 2020-12-02 | Stop reason: HOSPADM

## 2020-01-01 RX ORDER — PHENYLEPHRINE HCL IN 0.9% NACL 0.4MG/10ML
SYRINGE (ML) INTRAVENOUS
Status: DISCONTINUED
Start: 2020-01-01 | End: 2020-01-01

## 2020-01-01 RX ORDER — FUROSEMIDE 10 MG/ML
20 INJECTION INTRAMUSCULAR; INTRAVENOUS
Status: COMPLETED | OUTPATIENT
Start: 2020-01-01 | End: 2020-01-01

## 2020-01-01 RX ORDER — GLYCOPYRROLATE 0.2 MG/ML
INJECTION INTRAMUSCULAR; INTRAVENOUS
Status: DISCONTINUED
Start: 2020-01-01 | End: 2020-01-01 | Stop reason: HOSPADM

## 2020-01-01 RX ORDER — DEXTROSE MONOHYDRATE 100 MG/ML
0-250 INJECTION, SOLUTION INTRAVENOUS ONCE
Status: COMPLETED | OUTPATIENT
Start: 2020-01-01 | End: 2020-01-01

## 2020-01-01 RX ORDER — DEXAMETHASONE SODIUM PHOSPHATE 10 MG/ML
10 INJECTION INTRAMUSCULAR; INTRAVENOUS ONCE
Status: COMPLETED | OUTPATIENT
Start: 2020-01-01 | End: 2020-01-01

## 2020-01-01 RX ORDER — SODIUM BICARBONATE 650 MG/1
1300 TABLET ORAL 4 TIMES DAILY
Status: DISCONTINUED | OUTPATIENT
Start: 2020-01-01 | End: 2020-01-01 | Stop reason: HOSPADM

## 2020-01-01 RX ORDER — SODIUM CHLORIDE 9 MG/ML
250 INJECTION, SOLUTION INTRAVENOUS AS NEEDED
Status: DISCONTINUED | OUTPATIENT
Start: 2020-01-01 | End: 2020-01-01 | Stop reason: HOSPADM

## 2020-01-01 RX ORDER — AMLODIPINE BESYLATE 10 MG/1
10 TABLET ORAL DAILY
Status: DISCONTINUED | OUTPATIENT
Start: 2020-01-01 | End: 2020-01-01 | Stop reason: HOSPADM

## 2020-01-01 RX ORDER — DIPHENHYDRAMINE HYDROCHLORIDE 50 MG/ML
50 INJECTION, SOLUTION INTRAMUSCULAR; INTRAVENOUS ONCE
Status: COMPLETED | OUTPATIENT
Start: 2020-01-01 | End: 2020-01-01

## 2020-01-01 RX ORDER — MAGNESIUM SULFATE 100 %
4 CRYSTALS MISCELLANEOUS AS NEEDED
Status: DISCONTINUED | OUTPATIENT
Start: 2020-01-01 | End: 2020-01-01 | Stop reason: SDUPTHER

## 2020-01-01 RX ORDER — HYDROCORTISONE SODIUM SUCCINATE 100 MG/2ML
100 INJECTION, POWDER, FOR SOLUTION INTRAMUSCULAR; INTRAVENOUS AS NEEDED
Status: CANCELLED | OUTPATIENT
Start: 2020-01-01

## 2020-01-01 RX ORDER — AMLODIPINE BESYLATE AND ATORVASTATIN CALCIUM 10; 10 MG/1; MG/1
1 TABLET, FILM COATED ORAL DAILY
COMMUNITY
End: 2020-01-01

## 2020-01-01 RX ORDER — METOPROLOL SUCCINATE 50 MG/1
TABLET, EXTENDED RELEASE ORAL DAILY
COMMUNITY
End: 2020-01-01 | Stop reason: CLARIF

## 2020-01-01 RX ORDER — FLUCONAZOLE 2 MG/ML
200 INJECTION, SOLUTION INTRAVENOUS EVERY 24 HOURS
Status: DISCONTINUED | OUTPATIENT
Start: 2020-01-01 | End: 2020-01-01 | Stop reason: DRUGHIGH

## 2020-01-01 RX ORDER — SODIUM CHLORIDE 0.9 % (FLUSH) 0.9 %
10 SYRINGE (ML) INJECTION AS NEEDED
Status: DISPENSED | OUTPATIENT
Start: 2020-01-01 | End: 2020-01-01

## 2020-01-01 RX ORDER — SODIUM CHLORIDE 0.9 % (FLUSH) 0.9 %
5-40 SYRINGE (ML) INJECTION AS NEEDED
Status: DISCONTINUED | OUTPATIENT
Start: 2020-01-01 | End: 2020-12-02 | Stop reason: HOSPADM

## 2020-01-01 RX ORDER — CALCITRIOL 0.25 UG/1
0.25 CAPSULE ORAL DAILY
COMMUNITY

## 2020-01-01 RX ORDER — FUROSEMIDE 10 MG/ML
40 INJECTION INTRAMUSCULAR; INTRAVENOUS
Status: COMPLETED | OUTPATIENT
Start: 2020-01-01 | End: 2020-01-01

## 2020-01-01 RX ORDER — MYCOPHENOLIC ACID 180 MG/1
360 TABLET, DELAYED RELEASE ORAL 2 TIMES DAILY
COMMUNITY
End: 2020-01-01

## 2020-01-01 RX ORDER — ACETAMINOPHEN 325 MG/1
650 TABLET ORAL ONCE
Status: COMPLETED | OUTPATIENT
Start: 2020-01-01 | End: 2020-01-01

## 2020-01-01 RX ORDER — ISOSORBIDE MONONITRATE 60 MG/1
60 TABLET, EXTENDED RELEASE ORAL 2 TIMES DAILY
COMMUNITY

## 2020-01-01 RX ORDER — SODIUM CHLORIDE 9 MG/ML
10 INJECTION INTRAMUSCULAR; INTRAVENOUS; SUBCUTANEOUS AS NEEDED
Status: ACTIVE | OUTPATIENT
Start: 2020-01-01 | End: 2020-01-01

## 2020-01-01 RX ORDER — HYDRALAZINE HYDROCHLORIDE 25 MG/1
50 TABLET, FILM COATED ORAL 3 TIMES DAILY
Status: CANCELLED | OUTPATIENT
Start: 2020-01-01

## 2020-01-01 RX ORDER — MYCOPHENOLATE MOFETIL 250 MG/1
500 CAPSULE ORAL
Status: CANCELLED | OUTPATIENT
Start: 2020-01-01

## 2020-01-01 RX ORDER — IPRATROPIUM BROMIDE AND ALBUTEROL SULFATE 2.5; .5 MG/3ML; MG/3ML
3 SOLUTION RESPIRATORY (INHALATION)
Status: DISCONTINUED | OUTPATIENT
Start: 2020-01-01 | End: 2020-01-01

## 2020-01-01 RX ORDER — DEXAMETHASONE SODIUM PHOSPHATE 4 MG/ML
6 INJECTION, SOLUTION INTRA-ARTICULAR; INTRALESIONAL; INTRAMUSCULAR; INTRAVENOUS; SOFT TISSUE ONCE
Status: COMPLETED | OUTPATIENT
Start: 2020-01-01 | End: 2020-01-01

## 2020-01-01 RX ORDER — METOPROLOL SUCCINATE 50 MG/1
50 TABLET, EXTENDED RELEASE ORAL DAILY
Status: DISCONTINUED | OUTPATIENT
Start: 2020-01-01 | End: 2020-01-01 | Stop reason: CLARIF

## 2020-01-01 RX ORDER — TACROLIMUS 1 MG/1
1 CAPSULE ORAL EVERY 12 HOURS
Qty: 60 CAP | Refills: 0 | Status: SHIPPED | OUTPATIENT
Start: 2020-01-01 | End: 2020-01-01

## 2020-01-01 RX ORDER — PREDNISONE 5 MG/1
7.5 TABLET ORAL
Status: CANCELLED | OUTPATIENT
Start: 2020-01-01

## 2020-01-01 RX ORDER — MYCOPHENOLATE MOFETIL 250 MG/1
500 CAPSULE ORAL
Status: DISCONTINUED | OUTPATIENT
Start: 2020-01-01 | End: 2020-01-01 | Stop reason: HOSPADM

## 2020-01-01 RX ORDER — FUROSEMIDE 40 MG/1
80 TABLET ORAL
Qty: 30 TAB | Refills: 0 | Status: SHIPPED | OUTPATIENT
Start: 2020-01-01

## 2020-01-01 RX ORDER — EPINEPHRINE 1 MG/ML
0.3 INJECTION, SOLUTION, CONCENTRATE INTRAVENOUS AS NEEDED
Status: CANCELLED | OUTPATIENT
Start: 2020-01-01

## 2020-01-01 RX ORDER — METOPROLOL TARTRATE 25 MG/1
25 TABLET, FILM COATED ORAL 2 TIMES DAILY
Status: DISCONTINUED | OUTPATIENT
Start: 2020-01-01 | End: 2020-01-01 | Stop reason: HOSPADM

## 2020-01-01 RX ORDER — EPINEPHRINE 1 MG/ML
INJECTION, SOLUTION, CONCENTRATE INTRAVENOUS
Status: DISCONTINUED
Start: 2020-01-01 | End: 2020-01-01 | Stop reason: HOSPADM

## 2020-01-01 RX ORDER — ACETAMINOPHEN 325 MG/1
650 TABLET ORAL ONCE
Status: CANCELLED
Start: 2020-01-01

## 2020-01-01 RX ORDER — METOPROLOL TARTRATE 50 MG/1
50 TABLET ORAL 2 TIMES DAILY
Status: DISCONTINUED | OUTPATIENT
Start: 2020-01-01 | End: 2020-01-01

## 2020-01-01 RX ORDER — MIDAZOLAM HYDROCHLORIDE 1 MG/ML
2 INJECTION, SOLUTION INTRAMUSCULAR; INTRAVENOUS ONCE
Status: COMPLETED | OUTPATIENT
Start: 2020-01-01 | End: 2020-01-01

## 2020-01-01 RX ORDER — HALOPERIDOL 5 MG/ML
10 INJECTION INTRAMUSCULAR ONCE
Status: COMPLETED | OUTPATIENT
Start: 2020-01-01 | End: 2020-01-01

## 2020-01-01 RX ORDER — TACROLIMUS 1 MG/1
2 CAPSULE ORAL EVERY 12 HOURS
Status: ON HOLD | COMMUNITY
End: 2020-01-01 | Stop reason: SDUPTHER

## 2020-01-01 RX ORDER — MYCOPHENOLATE MOFETIL 250 MG/1
500 CAPSULE ORAL
Qty: 60 CAP | Refills: 0 | Status: ON HOLD | OUTPATIENT
Start: 2020-01-01 | End: 2020-01-01 | Stop reason: DRUGHIGH

## 2020-01-01 RX ORDER — TACROLIMUS 1 MG/1
1 CAPSULE ORAL EVERY 12 HOURS
Status: CANCELLED | OUTPATIENT
Start: 2020-01-01

## 2020-01-01 RX ORDER — AMLODIPINE BESYLATE 5 MG/1
10 TABLET ORAL DAILY
Status: CANCELLED | OUTPATIENT
Start: 2020-01-01

## 2020-01-01 RX ORDER — HALOPERIDOL 5 MG/ML
INJECTION INTRAMUSCULAR
Status: COMPLETED
Start: 2020-01-01 | End: 2020-01-01

## 2020-01-01 RX ORDER — ISOSORBIDE MONONITRATE 60 MG/1
60 TABLET, EXTENDED RELEASE ORAL 2 TIMES DAILY
Status: CANCELLED | OUTPATIENT
Start: 2020-01-01

## 2020-01-01 RX ORDER — PROPOFOL 10 MG/ML
INJECTION, EMULSION INTRAVENOUS
Status: COMPLETED
Start: 2020-01-01 | End: 2020-01-01

## 2020-01-01 RX ORDER — HYDRALAZINE HYDROCHLORIDE 25 MG/1
50 TABLET, FILM COATED ORAL 3 TIMES DAILY
Status: DISCONTINUED | OUTPATIENT
Start: 2020-01-01 | End: 2020-01-01 | Stop reason: HOSPADM

## 2020-01-01 RX ORDER — METOPROLOL TARTRATE 50 MG/1
50 TABLET ORAL 2 TIMES DAILY
Status: DISCONTINUED | OUTPATIENT
Start: 2020-01-01 | End: 2020-01-01 | Stop reason: HOSPADM

## 2020-01-01 RX ORDER — ASPIRIN 81 MG/1
81 TABLET ORAL DAILY
Status: DISCONTINUED | OUTPATIENT
Start: 2020-01-01 | End: 2020-01-01

## 2020-01-01 RX ORDER — ONDANSETRON 2 MG/ML
4 INJECTION INTRAMUSCULAR; INTRAVENOUS
Status: DISCONTINUED | OUTPATIENT
Start: 2020-01-01 | End: 2020-12-02 | Stop reason: HOSPADM

## 2020-01-01 RX ORDER — ACETAMINOPHEN 650 MG/1
650 SUPPOSITORY RECTAL
Status: DISCONTINUED | OUTPATIENT
Start: 2020-01-01 | End: 2020-01-01 | Stop reason: SDUPTHER

## 2020-01-01 RX ORDER — CALCITRIOL 0.25 UG/1
0.25 CAPSULE ORAL DAILY
Status: CANCELLED | OUTPATIENT
Start: 2020-01-01

## 2020-01-01 RX ORDER — SODIUM CHLORIDE 9 MG/ML
50 INJECTION, SOLUTION INTRAVENOUS CONTINUOUS
Status: DISCONTINUED | OUTPATIENT
Start: 2020-01-01 | End: 2020-01-01

## 2020-01-01 RX ORDER — MAGNESIUM SULFATE 100 %
4 CRYSTALS MISCELLANEOUS AS NEEDED
Status: DISCONTINUED | OUTPATIENT
Start: 2020-01-01 | End: 2020-01-01

## 2020-01-01 RX ORDER — SODIUM CHLORIDE 9 MG/ML
25 INJECTION, SOLUTION INTRAVENOUS CONTINUOUS
Status: DISPENSED | OUTPATIENT
Start: 2020-01-01 | End: 2020-01-01

## 2020-01-01 RX ORDER — ASPIRIN 81 MG/1
81 TABLET ORAL DAILY
COMMUNITY

## 2020-01-01 RX ORDER — ACETAMINOPHEN 500 MG
2000 TABLET ORAL 2 TIMES DAILY
Status: CANCELLED | OUTPATIENT
Start: 2020-01-01

## 2020-01-01 RX ORDER — ROCURONIUM BROMIDE 10 MG/ML
INJECTION, SOLUTION INTRAVENOUS
Status: DISCONTINUED
Start: 2020-01-01 | End: 2020-01-01

## 2020-01-01 RX ORDER — HYDROXYZINE HYDROCHLORIDE 10 MG/1
10 TABLET, FILM COATED ORAL
Status: DISCONTINUED | OUTPATIENT
Start: 2020-01-01 | End: 2020-12-02 | Stop reason: HOSPADM

## 2020-01-01 RX ORDER — ENOXAPARIN SODIUM 100 MG/ML
30 INJECTION SUBCUTANEOUS EVERY 12 HOURS
Status: DISCONTINUED | OUTPATIENT
Start: 2020-01-01 | End: 2020-01-01 | Stop reason: DRUGHIGH

## 2020-01-01 RX ORDER — AMLODIPINE BESYLATE 10 MG/1
10 TABLET ORAL DAILY
COMMUNITY

## 2020-01-01 RX ORDER — FUROSEMIDE 40 MG/1
40 TABLET ORAL ONCE
Status: COMPLETED | OUTPATIENT
Start: 2020-01-01 | End: 2020-01-01

## 2020-01-01 RX ORDER — DEXAMETHASONE SODIUM PHOSPHATE 10 MG/ML
10 INJECTION INTRAMUSCULAR; INTRAVENOUS ONCE
Status: CANCELLED
Start: 2020-01-01

## 2020-01-01 RX ORDER — ACETAMINOPHEN 650 MG/1
650 SUPPOSITORY RECTAL
Status: DISCONTINUED | OUTPATIENT
Start: 2020-01-01 | End: 2020-12-02 | Stop reason: HOSPADM

## 2020-01-01 RX ORDER — FUROSEMIDE 10 MG/ML
20 INJECTION INTRAMUSCULAR; INTRAVENOUS ONCE
Status: COMPLETED | OUTPATIENT
Start: 2020-01-01 | End: 2020-01-01

## 2020-01-01 RX ORDER — LIDOCAINE HYDROCHLORIDE 20 MG/ML
INJECTION, SOLUTION EPIDURAL; INFILTRATION; INTRACAUDAL; PERINEURAL AS NEEDED
Status: DISCONTINUED | OUTPATIENT
Start: 2020-01-01 | End: 2020-01-01 | Stop reason: HOSPADM

## 2020-01-01 RX ORDER — ONDANSETRON 2 MG/ML
4 INJECTION INTRAMUSCULAR; INTRAVENOUS
Status: DISCONTINUED | OUTPATIENT
Start: 2020-01-01 | End: 2020-01-01 | Stop reason: HOSPADM

## 2020-01-01 RX ORDER — HYDRALAZINE HYDROCHLORIDE 50 MG/1
50 TABLET, FILM COATED ORAL 3 TIMES DAILY
COMMUNITY

## 2020-01-01 RX ORDER — ZOLPIDEM TARTRATE 5 MG/1
5 TABLET ORAL
COMMUNITY

## 2020-01-01 RX ORDER — MYCOPHENOLATE MOFETIL 250 MG/1
250 CAPSULE ORAL
Status: CANCELLED | OUTPATIENT
Start: 2020-01-01

## 2020-01-01 RX ORDER — ONDANSETRON 2 MG/ML
8 INJECTION INTRAMUSCULAR; INTRAVENOUS AS NEEDED
Status: CANCELLED | OUTPATIENT
Start: 2020-01-01

## 2020-01-01 RX ORDER — FUROSEMIDE 10 MG/ML
40 INJECTION INTRAMUSCULAR; INTRAVENOUS 2 TIMES DAILY
Status: DISCONTINUED | OUTPATIENT
Start: 2020-01-01 | End: 2020-01-01

## 2020-01-01 RX ORDER — DEXMEDETOMIDINE HYDROCHLORIDE 4 UG/ML
.1-1.5 INJECTION, SOLUTION INTRAVENOUS
Status: DISCONTINUED | OUTPATIENT
Start: 2020-01-01 | End: 2020-12-02 | Stop reason: HOSPADM

## 2020-01-01 RX ORDER — SODIUM CHLORIDE 9 MG/ML
25 INJECTION, SOLUTION INTRAVENOUS CONTINUOUS
Status: CANCELLED | OUTPATIENT
Start: 2020-01-01

## 2020-01-01 RX ORDER — SODIUM CHLORIDE 0.9 % (FLUSH) 0.9 %
5-40 SYRINGE (ML) INJECTION AS NEEDED
Status: DISCONTINUED | OUTPATIENT
Start: 2020-01-01 | End: 2020-01-01

## 2020-01-01 RX ORDER — OMEPRAZOLE 40 MG/1
40 CAPSULE, DELAYED RELEASE ORAL DAILY
Status: CANCELLED | OUTPATIENT
Start: 2020-01-01

## 2020-01-01 RX ORDER — DEXTROSE 50 % IN WATER (D50W) INTRAVENOUS SYRINGE
25-50 AS NEEDED
Status: DISCONTINUED | OUTPATIENT
Start: 2020-01-01 | End: 2020-01-01 | Stop reason: HOSPADM

## 2020-01-01 RX ORDER — SODIUM CHLORIDE, SODIUM LACTATE, POTASSIUM CHLORIDE, CALCIUM CHLORIDE 600; 310; 30; 20 MG/100ML; MG/100ML; MG/100ML; MG/100ML
75 INJECTION, SOLUTION INTRAVENOUS CONTINUOUS
Status: DISCONTINUED | OUTPATIENT
Start: 2020-01-01 | End: 2020-01-01 | Stop reason: HOSPADM

## 2020-01-01 RX ORDER — POLYETHYLENE GLYCOL 3350 17 G/17G
17 POWDER, FOR SOLUTION ORAL DAILY PRN
Status: DISCONTINUED | OUTPATIENT
Start: 2020-01-01 | End: 2020-12-02 | Stop reason: HOSPADM

## 2020-01-01 RX ORDER — FUROSEMIDE 40 MG/1
80 TABLET ORAL
Status: DISCONTINUED | OUTPATIENT
Start: 2020-01-01 | End: 2020-01-01 | Stop reason: HOSPADM

## 2020-01-01 RX ORDER — ASPIRIN 81 MG/1
81 TABLET ORAL DAILY
Status: DISCONTINUED | OUTPATIENT
Start: 2020-01-01 | End: 2020-01-01 | Stop reason: HOSPADM

## 2020-01-01 RX ORDER — FENTANYL CITRATE 50 UG/ML
50 INJECTION, SOLUTION INTRAMUSCULAR; INTRAVENOUS ONCE
Status: DISCONTINUED | OUTPATIENT
Start: 2020-01-01 | End: 2020-01-01

## 2020-01-01 RX ORDER — TACROLIMUS 1 MG/1
2 CAPSULE ORAL EVERY 12 HOURS
Status: DISCONTINUED | OUTPATIENT
Start: 2020-01-01 | End: 2020-01-01

## 2020-01-01 RX ORDER — HYDRALAZINE HYDROCHLORIDE 20 MG/ML
20 INJECTION INTRAMUSCULAR; INTRAVENOUS
Status: COMPLETED | OUTPATIENT
Start: 2020-01-01 | End: 2020-01-01

## 2020-01-01 RX ORDER — ZOLPIDEM TARTRATE 5 MG/1
5 TABLET ORAL
Status: DISCONTINUED | OUTPATIENT
Start: 2020-01-01 | End: 2020-01-01 | Stop reason: HOSPADM

## 2020-01-01 RX ORDER — FLUTICASONE PROPIONATE 50 MCG
2 SPRAY, SUSPENSION (ML) NASAL DAILY
Status: DISCONTINUED | OUTPATIENT
Start: 2020-01-01 | End: 2020-01-01 | Stop reason: HOSPADM

## 2020-01-01 RX ORDER — HYDRALAZINE HYDROCHLORIDE 50 MG/1
50 TABLET, FILM COATED ORAL 3 TIMES DAILY
Status: DISCONTINUED | OUTPATIENT
Start: 2020-01-01 | End: 2020-01-01 | Stop reason: HOSPADM

## 2020-01-01 RX ORDER — MYCOPHENOLATE MOFETIL 250 MG/1
250 CAPSULE ORAL 2 TIMES DAILY
COMMUNITY

## 2020-01-01 RX ORDER — ACETAMINOPHEN 325 MG/1
650 TABLET ORAL
Status: DISCONTINUED | OUTPATIENT
Start: 2020-01-01 | End: 2020-01-01 | Stop reason: HOSPADM

## 2020-01-01 RX ORDER — GEMFIBROZIL 600 MG/1
600 TABLET, FILM COATED ORAL DAILY
COMMUNITY

## 2020-01-01 RX ORDER — DEXTROSE MONOHYDRATE 100 MG/ML
0-250 INJECTION, SOLUTION INTRAVENOUS AS NEEDED
Status: DISCONTINUED | OUTPATIENT
Start: 2020-01-01 | End: 2020-01-01 | Stop reason: HOSPADM

## 2020-01-01 RX ORDER — EPINEPHRINE 0.1 MG/ML
INJECTION INTRACARDIAC; INTRAVENOUS
Status: COMPLETED | OUTPATIENT
Start: 2020-01-01 | End: 2020-01-01

## 2020-01-01 RX ORDER — PROPOFOL 10 MG/ML
0-50 VIAL (ML) INTRAVENOUS
Status: DISCONTINUED | OUTPATIENT
Start: 2020-01-01 | End: 2020-12-02 | Stop reason: HOSPADM

## 2020-01-01 RX ORDER — MELATONIN
2000 DAILY
Status: DISCONTINUED | OUTPATIENT
Start: 2020-01-01 | End: 2020-01-01

## 2020-01-01 RX ORDER — SODIUM POLYSTYRENE SULFONATE 15 G/60ML
15 SUSPENSION ORAL; RECTAL EVERY OTHER DAY
Status: DISCONTINUED | OUTPATIENT
Start: 2020-01-01 | End: 2020-01-01

## 2020-01-01 RX ORDER — FUROSEMIDE 40 MG/1
40 TABLET ORAL
Status: COMPLETED | OUTPATIENT
Start: 2020-01-01 | End: 2020-01-01

## 2020-01-01 RX ORDER — LORAZEPAM 0.5 MG/1
0.5 TABLET ORAL ONCE
Status: COMPLETED | OUTPATIENT
Start: 2020-01-01 | End: 2020-01-01

## 2020-01-01 RX ORDER — HEPARIN 100 UNIT/ML
300-500 SYRINGE INTRAVENOUS AS NEEDED
Status: CANCELLED
Start: 2020-01-01

## 2020-01-01 RX ORDER — METOPROLOL TARTRATE 50 MG/1
50 TABLET ORAL 2 TIMES DAILY
COMMUNITY

## 2020-01-01 RX ORDER — VANCOMYCIN HYDROCHLORIDE 250 MG/5ML
125 POWDER, FOR SOLUTION ORAL EVERY 6 HOURS
Status: DISCONTINUED | OUTPATIENT
Start: 2020-01-01 | End: 2020-01-01

## 2020-01-01 RX ORDER — TACROLIMUS 0.5 MG/1
0.5 CAPSULE ORAL EVERY 12 HOURS
Status: DISCONTINUED | OUTPATIENT
Start: 2020-01-01 | End: 2020-01-01

## 2020-01-01 RX ORDER — DIPHENHYDRAMINE HYDROCHLORIDE 50 MG/ML
50 INJECTION, SOLUTION INTRAMUSCULAR; INTRAVENOUS AS NEEDED
Status: CANCELLED
Start: 2020-01-01

## 2020-01-01 RX ORDER — FENTANYL CITRATE 50 UG/ML
100 INJECTION, SOLUTION INTRAMUSCULAR; INTRAVENOUS ONCE
Status: COMPLETED | OUTPATIENT
Start: 2020-01-01 | End: 2020-01-01

## 2020-01-01 RX ORDER — PREDNISONE 2.5 MG/1
7.5 TABLET ORAL
COMMUNITY

## 2020-01-01 RX ORDER — SODIUM CHLORIDE 9 MG/ML
25 INJECTION, SOLUTION INTRAVENOUS CONTINUOUS
Status: DISCONTINUED | OUTPATIENT
Start: 2020-01-01 | End: 2020-01-01 | Stop reason: HOSPADM

## 2020-01-01 RX ORDER — DEXMEDETOMIDINE HYDROCHLORIDE 4 UG/ML
.1-1.5 INJECTION, SOLUTION INTRAVENOUS
Status: DISCONTINUED | OUTPATIENT
Start: 2020-01-01 | End: 2020-01-01 | Stop reason: SDUPTHER

## 2020-01-01 RX ORDER — FLUCONAZOLE 2 MG/ML
200 INJECTION, SOLUTION INTRAVENOUS ONCE
Status: COMPLETED | OUTPATIENT
Start: 2020-01-01 | End: 2020-01-01

## 2020-01-01 RX ORDER — FUROSEMIDE 40 MG/1
40 TABLET ORAL
Qty: 30 TAB | Refills: 0 | Status: SHIPPED | OUTPATIENT
Start: 2020-01-01

## 2020-01-01 RX ORDER — OMEPRAZOLE 40 MG/1
40 CAPSULE, DELAYED RELEASE ORAL DAILY
COMMUNITY

## 2020-01-01 RX ADMIN — METHYLPREDNISOLONE SODIUM SUCCINATE 40 MG: 40 INJECTION, POWDER, FOR SOLUTION INTRAMUSCULAR; INTRAVENOUS at 20:21

## 2020-01-01 RX ADMIN — HYDRALAZINE HYDROCHLORIDE 25 MG: 25 TABLET, FILM COATED ORAL at 17:25

## 2020-01-01 RX ADMIN — HYDRALAZINE HYDROCHLORIDE 100 MG: 50 TABLET, FILM COATED ORAL at 17:13

## 2020-01-01 RX ADMIN — INSULIN LISPRO 2 UNITS: 100 INJECTION, SOLUTION INTRAVENOUS; SUBCUTANEOUS at 08:37

## 2020-01-01 RX ADMIN — SODIUM CHLORIDE 40 MG: 9 INJECTION INTRAMUSCULAR; INTRAVENOUS; SUBCUTANEOUS at 08:46

## 2020-01-01 RX ADMIN — VANCOMYCIN HYDROCHLORIDE 125 MG: KIT at 12:32

## 2020-01-01 RX ADMIN — OXYCODONE HYDROCHLORIDE AND ACETAMINOPHEN 500 MG: 500 TABLET ORAL at 09:23

## 2020-01-01 RX ADMIN — Medication: at 06:43

## 2020-01-01 RX ADMIN — LINEZOLID 600 MG: 600 INJECTION, SOLUTION INTRAVENOUS at 13:44

## 2020-01-01 RX ADMIN — METRONIDAZOLE 500 MG: 500 INJECTION, SOLUTION INTRAVENOUS at 14:22

## 2020-01-01 RX ADMIN — CEFEPIME HYDROCHLORIDE 2 G: 2 INJECTION, POWDER, FOR SOLUTION INTRAVENOUS at 20:31

## 2020-01-01 RX ADMIN — HYDRALAZINE HYDROCHLORIDE 25 MG: 50 TABLET, FILM COATED ORAL at 09:43

## 2020-01-01 RX ADMIN — SODIUM CHLORIDE 50 ML/HR: 900 INJECTION, SOLUTION INTRAVENOUS at 16:09

## 2020-01-01 RX ADMIN — DIPHENHYDRAMINE HYDROCHLORIDE 25 MG: 50 INJECTION, SOLUTION INTRAMUSCULAR; INTRAVENOUS at 14:37

## 2020-01-01 RX ADMIN — Medication 10 ML: at 14:15

## 2020-01-01 RX ADMIN — HYDRALAZINE HYDROCHLORIDE 50 MG: 25 TABLET, FILM COATED ORAL at 21:33

## 2020-01-01 RX ADMIN — SODIUM BICARBONATE 1300 MG: 650 TABLET ORAL at 21:09

## 2020-01-01 RX ADMIN — AMLODIPINE BESYLATE 10 MG: 10 TABLET ORAL at 08:21

## 2020-01-01 RX ADMIN — DEXAMETHASONE SODIUM PHOSPHATE 6 MG: 4 INJECTION INTRA-ARTICULAR; INTRALESIONAL; INTRAMUSCULAR; INTRAVENOUS; SOFT TISSUE at 17:33

## 2020-01-01 RX ADMIN — LINEZOLID 600 MG: 600 INJECTION, SOLUTION INTRAVENOUS at 23:17

## 2020-01-01 RX ADMIN — INSULIN LISPRO 5 UNITS: 100 INJECTION, SOLUTION INTRAVENOUS; SUBCUTANEOUS at 16:57

## 2020-01-01 RX ADMIN — PROPOFOL 10 MCG/KG/MIN: 10 INJECTION, EMULSION INTRAVENOUS at 19:39

## 2020-01-01 RX ADMIN — ASPIRIN 81 MG: 81 TABLET, COATED ORAL at 08:20

## 2020-01-01 RX ADMIN — HYDROXYZINE HYDROCHLORIDE 10 MG: 10 TABLET, FILM COATED ORAL at 14:12

## 2020-01-01 RX ADMIN — SODIUM CHLORIDE 40 MG: 9 INJECTION INTRAMUSCULAR; INTRAVENOUS; SUBCUTANEOUS at 10:03

## 2020-01-01 RX ADMIN — INSULIN LISPRO 5 UNITS: 100 INJECTION, SOLUTION INTRAVENOUS; SUBCUTANEOUS at 17:16

## 2020-01-01 RX ADMIN — SODIUM CHLORIDE 10 ML: 9 INJECTION INTRAMUSCULAR; INTRAVENOUS; SUBCUTANEOUS at 21:50

## 2020-01-01 RX ADMIN — Medication 10 MCG/KG/MIN: at 19:39

## 2020-01-01 RX ADMIN — PHENOL 1 SPRAY: 1.4 SPRAY ORAL at 16:53

## 2020-01-01 RX ADMIN — TACROLIMUS 2 MG: 1 CAPSULE ORAL at 20:09

## 2020-01-01 RX ADMIN — NYSTATIN 500000 UNITS: 100000 SUSPENSION ORAL at 12:33

## 2020-01-01 RX ADMIN — HYDRALAZINE HYDROCHLORIDE 10 MG: 20 INJECTION INTRAMUSCULAR; INTRAVENOUS at 04:57

## 2020-01-01 RX ADMIN — INSULIN LISPRO 3 UNITS: 100 INJECTION, SOLUTION INTRAVENOUS; SUBCUTANEOUS at 17:15

## 2020-01-01 RX ADMIN — PATIROMER 8.4 G: 8.4 POWDER, FOR SUSPENSION ORAL at 09:17

## 2020-01-01 RX ADMIN — Medication 10 ML: at 06:35

## 2020-01-01 RX ADMIN — ASPIRIN 81 MG: 81 TABLET, COATED ORAL at 08:43

## 2020-01-01 RX ADMIN — ISOSORBIDE MONONITRATE 60 MG: 60 TABLET, EXTENDED RELEASE ORAL at 06:34

## 2020-01-01 RX ADMIN — METOPROLOL TARTRATE 50 MG: 50 TABLET, FILM COATED ORAL at 09:49

## 2020-01-01 RX ADMIN — DEXAMETHASONE SODIUM PHOSPHATE 4 MG: 4 INJECTION, SOLUTION INTRAMUSCULAR; INTRAVENOUS at 12:23

## 2020-01-01 RX ADMIN — HYDRALAZINE HYDROCHLORIDE 25 MG: 50 TABLET, FILM COATED ORAL at 10:03

## 2020-01-01 RX ADMIN — SODIUM CHLORIDE 40 MG: 9 INJECTION INTRAMUSCULAR; INTRAVENOUS; SUBCUTANEOUS at 09:22

## 2020-01-01 RX ADMIN — VANCOMYCIN HYDROCHLORIDE 125 MG: KIT at 17:16

## 2020-01-01 RX ADMIN — Medication 3 MG: at 21:49

## 2020-01-01 RX ADMIN — METOPROLOL TARTRATE 50 MG: 50 TABLET, FILM COATED ORAL at 08:46

## 2020-01-01 RX ADMIN — SODIUM CHLORIDE 100 ML/HR: 900 INJECTION, SOLUTION INTRAVENOUS at 18:14

## 2020-01-01 RX ADMIN — OXYCODONE 5 MG: 5 TABLET ORAL at 20:05

## 2020-01-01 RX ADMIN — OXYCODONE 5 MG: 5 TABLET ORAL at 07:58

## 2020-01-01 RX ADMIN — MYCOPHENOLATE MOFETIL 250 MG: 250 CAPSULE ORAL at 06:42

## 2020-01-01 RX ADMIN — HYDRALAZINE HYDROCHLORIDE 50 MG: 25 TABLET, FILM COATED ORAL at 21:09

## 2020-01-01 RX ADMIN — METOPROLOL TARTRATE 50 MG: 50 TABLET, FILM COATED ORAL at 20:57

## 2020-01-01 RX ADMIN — MYCOPHENOLIC ACID 360 MG: 360 TABLET, DELAYED RELEASE ORAL at 16:52

## 2020-01-01 RX ADMIN — NITROGLYCERIN 1 INCH: 20 OINTMENT TOPICAL at 09:43

## 2020-01-01 RX ADMIN — SODIUM BICARBONATE 1300 MG: 650 TABLET ORAL at 17:42

## 2020-01-01 RX ADMIN — VANCOMYCIN HYDROCHLORIDE 125 MG: KIT at 13:43

## 2020-01-01 RX ADMIN — SODIUM CHLORIDE 10 ML: 9 INJECTION INTRAMUSCULAR; INTRAVENOUS; SUBCUTANEOUS at 16:20

## 2020-01-01 RX ADMIN — NYSTATIN 500000 UNITS: 100000 SUSPENSION ORAL at 13:43

## 2020-01-01 RX ADMIN — SODIUM CHLORIDE 65 ML/HR: 900 INJECTION, SOLUTION INTRAVENOUS at 20:32

## 2020-01-01 RX ADMIN — ALLOPURINOL 100 MG: 100 TABLET ORAL at 09:23

## 2020-01-01 RX ADMIN — PREDNISONE 7.5 MG: 5 TABLET ORAL at 08:21

## 2020-01-01 RX ADMIN — NITROGLYCERIN 1 INCH: 20 OINTMENT TOPICAL at 22:24

## 2020-01-01 RX ADMIN — Medication 81 MG: at 09:01

## 2020-01-01 RX ADMIN — INSULIN LISPRO 5 UNITS: 100 INJECTION, SOLUTION INTRAVENOUS; SUBCUTANEOUS at 12:32

## 2020-01-01 RX ADMIN — MYCOPHENOLATE MOFETIL 250 MG: 250 CAPSULE ORAL at 06:55

## 2020-01-01 RX ADMIN — METHYLPREDNISOLONE SODIUM SUCCINATE 40 MG: 40 INJECTION, POWDER, FOR SOLUTION INTRAMUSCULAR; INTRAVENOUS at 19:25

## 2020-01-01 RX ADMIN — SODIUM BICARBONATE 1300 MG: 650 TABLET ORAL at 17:16

## 2020-01-01 RX ADMIN — Medication 10 ML: at 22:25

## 2020-01-01 RX ADMIN — SODIUM CHLORIDE 40 MG: 9 INJECTION INTRAMUSCULAR; INTRAVENOUS; SUBCUTANEOUS at 09:16

## 2020-01-01 RX ADMIN — ISOSORBIDE MONONITRATE 60 MG: 60 TABLET, EXTENDED RELEASE ORAL at 09:28

## 2020-01-01 RX ADMIN — PIPERACILLIN AND TAZOBACTAM 3.38 G: 3; .375 INJECTION, POWDER, LYOPHILIZED, FOR SOLUTION INTRAVENOUS at 02:28

## 2020-01-01 RX ADMIN — DEXAMETHASONE SODIUM PHOSPHATE 4 MG: 4 INJECTION, SOLUTION INTRAMUSCULAR; INTRAVENOUS at 11:54

## 2020-01-01 RX ADMIN — SODIUM CHLORIDE 10 ML: 9 INJECTION INTRAMUSCULAR; INTRAVENOUS; SUBCUTANEOUS at 06:42

## 2020-01-01 RX ADMIN — SODIUM BICARBONATE 1300 MG: 650 TABLET ORAL at 13:01

## 2020-01-01 RX ADMIN — OXYCODONE 5 MG: 5 TABLET ORAL at 23:36

## 2020-01-01 RX ADMIN — FLUCONAZOLE 200 MG: 200 INJECTION, SOLUTION INTRAVENOUS at 17:42

## 2020-01-01 RX ADMIN — Medication 10 ML: at 22:04

## 2020-01-01 RX ADMIN — Medication 81 MG: at 09:22

## 2020-01-01 RX ADMIN — Medication 2 TABLET: at 09:17

## 2020-01-01 RX ADMIN — DEXTROSE MONOHYDRATE 250 ML: 10 INJECTION, SOLUTION INTRAVENOUS at 09:11

## 2020-01-01 RX ADMIN — ACETAMINOPHEN 650 MG: 325 TABLET ORAL at 21:09

## 2020-01-01 RX ADMIN — VANCOMYCIN HYDROCHLORIDE 125 MG: KIT at 05:36

## 2020-01-01 RX ADMIN — CALCITRIOL CAPSULES 0.25 MCG 0.25 MCG: 0.25 CAPSULE ORAL at 15:37

## 2020-01-01 RX ADMIN — METHYLPREDNISOLONE SODIUM SUCCINATE 40 MG: 40 INJECTION, POWDER, FOR SOLUTION INTRAMUSCULAR; INTRAVENOUS at 09:23

## 2020-01-01 RX ADMIN — LORAZEPAM 1 MG: 2 INJECTION INTRAMUSCULAR; INTRAVENOUS at 21:55

## 2020-01-01 RX ADMIN — SODIUM CHLORIDE 10 ML: 9 INJECTION INTRAMUSCULAR; INTRAVENOUS; SUBCUTANEOUS at 21:31

## 2020-01-01 RX ADMIN — SODIUM CHLORIDE 10 ML: 9 INJECTION INTRAMUSCULAR; INTRAVENOUS; SUBCUTANEOUS at 21:00

## 2020-01-01 RX ADMIN — NITROGLYCERIN 1 INCH: 20 OINTMENT TOPICAL at 10:04

## 2020-01-01 RX ADMIN — OXYCODONE HYDROCHLORIDE AND ACETAMINOPHEN 500 MG: 500 TABLET ORAL at 17:16

## 2020-01-01 RX ADMIN — ALLOPURINOL 100 MG: 100 TABLET ORAL at 08:46

## 2020-01-01 RX ADMIN — HYDROMORPHONE HYDROCHLORIDE 0.5 MG: 1 INJECTION, SOLUTION INTRAMUSCULAR; INTRAVENOUS; SUBCUTANEOUS at 21:59

## 2020-01-01 RX ADMIN — INSULIN LISPRO 4 UNITS: 100 INJECTION, SOLUTION INTRAVENOUS; SUBCUTANEOUS at 09:33

## 2020-01-01 RX ADMIN — SODIUM BICARBONATE 1300 MG: 650 TABLET ORAL at 12:03

## 2020-01-01 RX ADMIN — HUMAN INSULIN 10 UNITS: 100 INJECTION, SOLUTION SUBCUTANEOUS at 09:10

## 2020-01-01 RX ADMIN — Medication 2 TABLET: at 12:30

## 2020-01-01 RX ADMIN — SODIUM BICARBONATE 1300 MG: 650 TABLET ORAL at 08:20

## 2020-01-01 RX ADMIN — OXYCODONE 5 MG: 5 TABLET ORAL at 14:12

## 2020-01-01 RX ADMIN — AMLODIPINE BESYLATE 10 MG: 5 TABLET ORAL at 09:16

## 2020-01-01 RX ADMIN — SODIUM CHLORIDE 10 ML: 9 INJECTION INTRAMUSCULAR; INTRAVENOUS; SUBCUTANEOUS at 15:16

## 2020-01-01 RX ADMIN — SODIUM CHLORIDE, SODIUM LACTATE, POTASSIUM CHLORIDE, AND CALCIUM CHLORIDE 500 ML: 600; 310; 30; 20 INJECTION, SOLUTION INTRAVENOUS at 19:47

## 2020-01-01 RX ADMIN — TACROLIMUS 1 MG: 1 CAPSULE ORAL at 09:17

## 2020-01-01 RX ADMIN — PIPERACILLIN AND TAZOBACTAM 3.38 G: 3; .375 INJECTION, POWDER, LYOPHILIZED, FOR SOLUTION INTRAVENOUS at 18:17

## 2020-01-01 RX ADMIN — CALCITRIOL 0.25 MCG: 0.25 CAPSULE ORAL at 09:28

## 2020-01-01 RX ADMIN — Medication: at 15:13

## 2020-01-01 RX ADMIN — TACROLIMUS 0.5 MG: 0.5 CAPSULE ORAL at 22:24

## 2020-01-01 RX ADMIN — OXYCODONE 5 MG: 5 TABLET ORAL at 19:19

## 2020-01-01 RX ADMIN — ISOSORBIDE MONONITRATE 60 MG: 60 TABLET, EXTENDED RELEASE ORAL at 08:19

## 2020-01-01 RX ADMIN — ACETAMINOPHEN 650 MG: 325 TABLET ORAL at 22:32

## 2020-01-01 RX ADMIN — TACROLIMUS 1 MG: 1 CAPSULE ORAL at 21:34

## 2020-01-01 RX ADMIN — PIPERACILLIN AND TAZOBACTAM 3.38 G: 3; .375 INJECTION, POWDER, LYOPHILIZED, FOR SOLUTION INTRAVENOUS at 23:28

## 2020-01-01 RX ADMIN — SODIUM CHLORIDE 100 ML/HR: 900 INJECTION, SOLUTION INTRAVENOUS at 06:50

## 2020-01-01 RX ADMIN — Medication 3 MG: at 22:56

## 2020-01-01 RX ADMIN — OXYCODONE HYDROCHLORIDE AND ACETAMINOPHEN 500 MG: 500 TABLET ORAL at 08:46

## 2020-01-01 RX ADMIN — PANTOPRAZOLE SODIUM 40 MG: 40 TABLET, DELAYED RELEASE ORAL at 08:19

## 2020-01-01 RX ADMIN — PREDNISONE 7.5 MG: 5 TABLET ORAL at 09:29

## 2020-01-01 RX ADMIN — METRONIDAZOLE 500 MG: 500 INJECTION, SOLUTION INTRAVENOUS at 06:56

## 2020-01-01 RX ADMIN — DEXAMETHASONE SODIUM PHOSPHATE 4 MG: 4 INJECTION, SOLUTION INTRAMUSCULAR; INTRAVENOUS at 06:45

## 2020-01-01 RX ADMIN — OXYCODONE HYDROCHLORIDE AND ACETAMINOPHEN 500 MG: 500 TABLET ORAL at 12:00

## 2020-01-01 RX ADMIN — Medication: at 15:17

## 2020-01-01 RX ADMIN — OXYCODONE HYDROCHLORIDE AND ACETAMINOPHEN 500 MG: 500 TABLET ORAL at 18:17

## 2020-01-01 RX ADMIN — INSULIN LISPRO 4 UNITS: 100 INJECTION, SOLUTION INTRAVENOUS; SUBCUTANEOUS at 11:54

## 2020-01-01 RX ADMIN — INSULIN LISPRO 10 UNITS: 100 INJECTION, SOLUTION INTRAVENOUS; SUBCUTANEOUS at 07:54

## 2020-01-01 RX ADMIN — SODIUM CHLORIDE 200 MG: 9 INJECTION, SOLUTION INTRAVENOUS at 17:14

## 2020-01-01 RX ADMIN — INSULIN LISPRO 5 UNITS: 100 INJECTION, SOLUTION INTRAVENOUS; SUBCUTANEOUS at 23:30

## 2020-01-01 RX ADMIN — MYCOPHENOLATE MOFETIL 250 MG: 250 CAPSULE ORAL at 22:56

## 2020-01-01 RX ADMIN — METHYLPREDNISOLONE SODIUM SUCCINATE 40 MG: 40 INJECTION, POWDER, FOR SOLUTION INTRAMUSCULAR; INTRAVENOUS at 21:13

## 2020-01-01 RX ADMIN — Medication 81 MG: at 09:16

## 2020-01-01 RX ADMIN — PANTOPRAZOLE SODIUM 40 MG: 40 TABLET, DELAYED RELEASE ORAL at 08:38

## 2020-01-01 RX ADMIN — OXYCODONE 5 MG: 5 TABLET ORAL at 23:27

## 2020-01-01 RX ADMIN — INSULIN LISPRO 2 UNITS: 100 INJECTION, SOLUTION INTRAVENOUS; SUBCUTANEOUS at 22:04

## 2020-01-01 RX ADMIN — HYDRALAZINE HYDROCHLORIDE 20 MG: 20 INJECTION INTRAMUSCULAR; INTRAVENOUS at 10:02

## 2020-01-01 RX ADMIN — PANTOPRAZOLE SODIUM 40 MG: 40 TABLET, DELAYED RELEASE ORAL at 09:29

## 2020-01-01 RX ADMIN — TACROLIMUS 1 MG: 1 CAPSULE ORAL at 21:09

## 2020-01-01 RX ADMIN — INSULIN LISPRO 7 UNITS: 100 INJECTION, SOLUTION INTRAVENOUS; SUBCUTANEOUS at 13:38

## 2020-01-01 RX ADMIN — DEXAMETHASONE SODIUM PHOSPHATE 4 MG: 4 INJECTION, SOLUTION INTRAMUSCULAR; INTRAVENOUS at 23:57

## 2020-01-01 RX ADMIN — MYCOPHENOLATE MOFETIL 250 MG: 250 CAPSULE ORAL at 17:16

## 2020-01-01 RX ADMIN — TACROLIMUS 1 MG: 1 CAPSULE ORAL at 16:12

## 2020-01-01 RX ADMIN — HYDRALAZINE HYDROCHLORIDE 50 MG: 50 TABLET, FILM COATED ORAL at 14:38

## 2020-01-01 RX ADMIN — METHYLPREDNISOLONE SODIUM SUCCINATE 40 MG: 40 INJECTION, POWDER, FOR SOLUTION INTRAMUSCULAR; INTRAVENOUS at 09:16

## 2020-01-01 RX ADMIN — HYDRALAZINE HYDROCHLORIDE 25 MG: 50 TABLET, FILM COATED ORAL at 15:45

## 2020-01-01 RX ADMIN — SODIUM CHLORIDE 40 MG: 9 INJECTION INTRAMUSCULAR; INTRAVENOUS; SUBCUTANEOUS at 09:02

## 2020-01-01 RX ADMIN — GEMFIBROZIL 600 MG: 600 TABLET ORAL at 08:37

## 2020-01-01 RX ADMIN — NYSTATIN 500000 UNITS: 100000 SUSPENSION ORAL at 22:05

## 2020-01-01 RX ADMIN — SODIUM BICARBONATE: 84 INJECTION, SOLUTION INTRAVENOUS at 10:55

## 2020-01-01 RX ADMIN — INSULIN LISPRO 7 UNITS: 100 INJECTION, SOLUTION INTRAVENOUS; SUBCUTANEOUS at 18:18

## 2020-01-01 RX ADMIN — TACROLIMUS 1 MG: 1 CAPSULE ORAL at 22:56

## 2020-01-01 RX ADMIN — ZINC SULFATE 220 MG (50 MG) CAPSULE 1 CAPSULE: CAPSULE at 09:23

## 2020-01-01 RX ADMIN — AMLODIPINE BESYLATE 10 MG: 10 TABLET ORAL at 09:27

## 2020-01-01 RX ADMIN — INSULIN LISPRO 5 UNITS: 100 INJECTION, SOLUTION INTRAVENOUS; SUBCUTANEOUS at 18:19

## 2020-01-01 RX ADMIN — SODIUM CHLORIDE 50 ML/HR: 900 INJECTION, SOLUTION INTRAVENOUS at 21:30

## 2020-01-01 RX ADMIN — NITROGLYCERIN 1 INCH: 20 OINTMENT TOPICAL at 15:45

## 2020-01-01 RX ADMIN — SODIUM CHLORIDE 40 MG: 9 INJECTION INTRAMUSCULAR; INTRAVENOUS; SUBCUTANEOUS at 09:03

## 2020-01-01 RX ADMIN — SODIUM CHLORIDE 10 ML: 9 INJECTION INTRAMUSCULAR; INTRAVENOUS; SUBCUTANEOUS at 09:03

## 2020-01-01 RX ADMIN — INSULIN LISPRO 2 UNITS: 100 INJECTION, SOLUTION INTRAVENOUS; SUBCUTANEOUS at 21:29

## 2020-01-01 RX ADMIN — INSULIN LISPRO 3 UNITS: 100 INJECTION, SOLUTION INTRAVENOUS; SUBCUTANEOUS at 18:05

## 2020-01-01 RX ADMIN — Medication: at 06:31

## 2020-01-01 RX ADMIN — Medication: at 21:00

## 2020-01-01 RX ADMIN — METOPROLOL TARTRATE 50 MG: 50 TABLET, FILM COATED ORAL at 10:04

## 2020-01-01 RX ADMIN — MYCOPHENOLATE MOFETIL 250 MG: 250 CAPSULE ORAL at 08:10

## 2020-01-01 RX ADMIN — FUROSEMIDE 80 MG: 40 TABLET ORAL at 17:17

## 2020-01-01 RX ADMIN — SODIUM CHLORIDE 10 ML: 9 INJECTION INTRAMUSCULAR; INTRAVENOUS; SUBCUTANEOUS at 13:45

## 2020-01-01 RX ADMIN — HYDRALAZINE HYDROCHLORIDE 25 MG: 25 TABLET, FILM COATED ORAL at 21:00

## 2020-01-01 RX ADMIN — OXYCODONE HYDROCHLORIDE AND ACETAMINOPHEN 500 MG: 500 TABLET ORAL at 18:14

## 2020-01-01 RX ADMIN — OXYCODONE HYDROCHLORIDE AND ACETAMINOPHEN 500 MG: 500 TABLET ORAL at 18:19

## 2020-01-01 RX ADMIN — ENOXAPARIN SODIUM 30 MG: 30 INJECTION SUBCUTANEOUS at 17:31

## 2020-01-01 RX ADMIN — Medication 81 MG: at 10:04

## 2020-01-01 RX ADMIN — ASPIRIN 81 MG: 81 TABLET, COATED ORAL at 09:28

## 2020-01-01 RX ADMIN — SODIUM CHLORIDE 10 ML: 9 INJECTION INTRAMUSCULAR; INTRAVENOUS; SUBCUTANEOUS at 06:31

## 2020-01-01 RX ADMIN — ALLOPURINOL 100 MG: 100 TABLET ORAL at 17:16

## 2020-01-01 RX ADMIN — HALOPERIDOL 10 MG: 5 INJECTION INTRAMUSCULAR at 19:27

## 2020-01-01 RX ADMIN — ZINC SULFATE 220 MG (50 MG) CAPSULE 1 CAPSULE: CAPSULE at 09:01

## 2020-01-01 RX ADMIN — ENOXAPARIN SODIUM 30 MG: 30 INJECTION SUBCUTANEOUS at 18:14

## 2020-01-01 RX ADMIN — ALBUTEROL SULFATE 2 PUFF: 90 AEROSOL, METERED RESPIRATORY (INHALATION) at 05:05

## 2020-01-01 RX ADMIN — SODIUM CHLORIDE 100 MG: 9 INJECTION, SOLUTION INTRAVENOUS at 17:08

## 2020-01-01 RX ADMIN — Medication 2 TABLET: at 09:01

## 2020-01-01 RX ADMIN — CALCITRIOL 0.25 MCG: 0.25 CAPSULE ORAL at 08:20

## 2020-01-01 RX ADMIN — INSULIN LISPRO 7 UNITS: 100 INJECTION, SOLUTION INTRAVENOUS; SUBCUTANEOUS at 00:11

## 2020-01-01 RX ADMIN — LABETALOL HYDROCHLORIDE 20 MG: 5 INJECTION, SOLUTION INTRAVENOUS at 08:11

## 2020-01-01 RX ADMIN — OXYCODONE HYDROCHLORIDE AND ACETAMINOPHEN 500 MG: 500 TABLET ORAL at 09:17

## 2020-01-01 RX ADMIN — FUROSEMIDE 80 MG: 40 TABLET ORAL at 17:42

## 2020-01-01 RX ADMIN — Medication: at 06:00

## 2020-01-01 RX ADMIN — NYSTATIN 500000 UNITS: 100000 SUSPENSION ORAL at 17:15

## 2020-01-01 RX ADMIN — MYCOPHENOLATE MOFETIL 250 MG: 250 CAPSULE ORAL at 17:25

## 2020-01-01 RX ADMIN — METOPROLOL TARTRATE 50 MG: 50 TABLET, FILM COATED ORAL at 17:16

## 2020-01-01 RX ADMIN — ACETAMINOPHEN 650 MG: 325 TABLET ORAL at 03:25

## 2020-01-01 RX ADMIN — LABETALOL HYDROCHLORIDE 20 MG: 5 INJECTION, SOLUTION INTRAVENOUS at 03:42

## 2020-01-01 RX ADMIN — MYCOPHENOLATE MOFETIL 250 MG: 250 CAPSULE ORAL at 06:30

## 2020-01-01 RX ADMIN — PIPERACILLIN AND TAZOBACTAM 3.38 G: 3; .375 INJECTION, POWDER, LYOPHILIZED, FOR SOLUTION INTRAVENOUS at 09:17

## 2020-01-01 RX ADMIN — INSULIN LISPRO 2 UNITS: 100 INJECTION, SOLUTION INTRAVENOUS; SUBCUTANEOUS at 06:23

## 2020-01-01 RX ADMIN — LABETALOL HYDROCHLORIDE 20 MG: 5 INJECTION, SOLUTION INTRAVENOUS at 19:41

## 2020-01-01 RX ADMIN — Medication: at 14:00

## 2020-01-01 RX ADMIN — METHYLPREDNISOLONE SODIUM SUCCINATE 40 MG: 40 INJECTION, POWDER, FOR SOLUTION INTRAMUSCULAR; INTRAVENOUS at 20:09

## 2020-01-01 RX ADMIN — Medication 3 MG: at 22:20

## 2020-01-01 RX ADMIN — ZINC SULFATE 220 MG (50 MG) CAPSULE 1 CAPSULE: CAPSULE at 10:03

## 2020-01-01 RX ADMIN — GLYCOPYRROLATE 0.2 MG: 0.2 INJECTION, SOLUTION INTRAMUSCULAR; INTRAVENOUS at 21:36

## 2020-01-01 RX ADMIN — MYCOPHENOLATE MOFETIL 250 MG: 250 CAPSULE ORAL at 21:18

## 2020-01-01 RX ADMIN — Medication 3 MG: at 21:35

## 2020-01-01 RX ADMIN — HUMAN INSULIN 5 UNITS: 100 INJECTION, SOLUTION SUBCUTANEOUS at 14:53

## 2020-01-01 RX ADMIN — Medication: at 14:43

## 2020-01-01 RX ADMIN — VANCOMYCIN HYDROCHLORIDE 125 MG: KIT at 23:16

## 2020-01-01 RX ADMIN — HYDRALAZINE HYDROCHLORIDE 50 MG: 50 TABLET, FILM COATED ORAL at 21:49

## 2020-01-01 RX ADMIN — METOPROLOL TARTRATE 50 MG: 50 TABLET, FILM COATED ORAL at 09:01

## 2020-01-01 RX ADMIN — SODIUM CHLORIDE 10 ML: 9 INJECTION INTRAMUSCULAR; INTRAVENOUS; SUBCUTANEOUS at 22:00

## 2020-01-01 RX ADMIN — CALCIUM GLUCONATE 1 G: 98 INJECTION, SOLUTION INTRAVENOUS at 09:10

## 2020-01-01 RX ADMIN — Medication: at 21:36

## 2020-01-01 RX ADMIN — SODIUM BICARBONATE 1300 MG: 650 TABLET ORAL at 09:28

## 2020-01-01 RX ADMIN — METOPROLOL TARTRATE 25 MG: 25 TABLET, FILM COATED ORAL at 17:17

## 2020-01-01 RX ADMIN — MYCOPHENOLATE MOFETIL 250 MG: 250 CAPSULE ORAL at 17:13

## 2020-01-01 RX ADMIN — FUROSEMIDE 20 MG: 10 INJECTION, SOLUTION INTRAMUSCULAR; INTRAVENOUS at 12:50

## 2020-01-01 RX ADMIN — SODIUM CHLORIDE 40 MG: 9 INJECTION, SOLUTION INTRAMUSCULAR; INTRAVENOUS; SUBCUTANEOUS at 23:05

## 2020-01-01 RX ADMIN — FLUTICASONE PROPIONATE 2 SPRAY: 50 SPRAY, METERED NASAL at 16:52

## 2020-01-01 RX ADMIN — SODIUM CHLORIDE 10 ML: 9 INJECTION INTRAMUSCULAR; INTRAVENOUS; SUBCUTANEOUS at 13:39

## 2020-01-01 RX ADMIN — OXYCODONE HYDROCHLORIDE AND ACETAMINOPHEN 500 MG: 500 TABLET ORAL at 17:13

## 2020-01-01 RX ADMIN — VANCOMYCIN HYDROCHLORIDE 125 MG: KIT at 00:41

## 2020-01-01 RX ADMIN — SODIUM CHLORIDE 50 ML/HR: 900 INJECTION, SOLUTION INTRAVENOUS at 14:12

## 2020-01-01 RX ADMIN — ALLOPURINOL 100 MG: 100 TABLET ORAL at 09:01

## 2020-01-01 RX ADMIN — SODIUM BICARBONATE 50 MEQ: 84 INJECTION, SOLUTION INTRAVENOUS at 19:38

## 2020-01-01 RX ADMIN — MYCOPHENOLATE MOFETIL 500 MG: 250 CAPSULE ORAL at 08:19

## 2020-01-01 RX ADMIN — Medication: at 18:21

## 2020-01-01 RX ADMIN — NITROGLYCERIN 1 INCH: 20 OINTMENT TOPICAL at 13:50

## 2020-01-01 RX ADMIN — INSULIN LISPRO 2 UNITS: 100 INJECTION, SOLUTION INTRAVENOUS; SUBCUTANEOUS at 06:46

## 2020-01-01 RX ADMIN — Medication 81 MG: at 09:17

## 2020-01-01 RX ADMIN — ACETAMINOPHEN 650 MG: 325 TABLET ORAL at 14:12

## 2020-01-01 RX ADMIN — ALBUTEROL SULFATE 2 PUFF: 90 AEROSOL, METERED RESPIRATORY (INHALATION) at 21:58

## 2020-01-01 RX ADMIN — INSULIN LISPRO 2 UNITS: 100 INJECTION, SOLUTION INTRAVENOUS; SUBCUTANEOUS at 06:32

## 2020-01-01 RX ADMIN — LORAZEPAM 1 MG: 2 INJECTION INTRAMUSCULAR; INTRAVENOUS at 21:36

## 2020-01-01 RX ADMIN — HYDRALAZINE HYDROCHLORIDE 100 MG: 50 TABLET, FILM COATED ORAL at 09:01

## 2020-01-01 RX ADMIN — DEXAMETHASONE SODIUM PHOSPHATE 4 MG: 4 INJECTION, SOLUTION INTRAMUSCULAR; INTRAVENOUS at 23:03

## 2020-01-01 RX ADMIN — ALLOPURINOL 100 MG: 100 TABLET ORAL at 09:17

## 2020-01-01 RX ADMIN — ALLOPURINOL 100 MG: 100 TABLET ORAL at 18:19

## 2020-01-01 RX ADMIN — GEMFIBROZIL 600 MG: 600 TABLET ORAL at 09:28

## 2020-01-01 RX ADMIN — SODIUM CHLORIDE 40 MG: 9 INJECTION, SOLUTION INTRAMUSCULAR; INTRAVENOUS; SUBCUTANEOUS at 09:43

## 2020-01-01 RX ADMIN — NYSTATIN 500000 UNITS: 100000 SUSPENSION ORAL at 09:02

## 2020-01-01 RX ADMIN — TACROLIMUS 1 MG: 1 CAPSULE ORAL at 08:20

## 2020-01-01 RX ADMIN — PIPERACILLIN AND TAZOBACTAM 3.38 G: 3; .375 INJECTION, POWDER, LYOPHILIZED, FOR SOLUTION INTRAVENOUS at 01:23

## 2020-01-01 RX ADMIN — AMLODIPINE BESYLATE 10 MG: 5 TABLET ORAL at 14:13

## 2020-01-01 RX ADMIN — METHYLPREDNISOLONE SODIUM SUCCINATE 40 MG: 40 INJECTION, POWDER, FOR SOLUTION INTRAMUSCULAR; INTRAVENOUS at 20:54

## 2020-01-01 RX ADMIN — Medication 3 MG: at 21:30

## 2020-01-01 RX ADMIN — PIPERACILLIN AND TAZOBACTAM 3.38 G: 3; .375 INJECTION, POWDER, LYOPHILIZED, FOR SOLUTION INTRAVENOUS at 01:57

## 2020-01-01 RX ADMIN — METOPROLOL TARTRATE 50 MG: 50 TABLET, FILM COATED ORAL at 18:15

## 2020-01-01 RX ADMIN — SODIUM CHLORIDE 1000 ML: 9 INJECTION, SOLUTION INTRAVENOUS at 16:35

## 2020-01-01 RX ADMIN — LINEZOLID 600 MG: 600 INJECTION, SOLUTION INTRAVENOUS at 12:32

## 2020-01-01 RX ADMIN — ALLOPURINOL 100 MG: 100 TABLET ORAL at 18:15

## 2020-01-01 RX ADMIN — GEMFIBROZIL 600 MG: 600 TABLET ORAL at 08:21

## 2020-01-01 RX ADMIN — FUROSEMIDE 80 MG: 40 TABLET ORAL at 08:19

## 2020-01-01 RX ADMIN — Medication 3 MG: at 22:04

## 2020-01-01 RX ADMIN — AMLODIPINE BESYLATE 10 MG: 10 TABLET ORAL at 08:37

## 2020-01-01 RX ADMIN — METOPROLOL TARTRATE 25 MG: 25 TABLET, FILM COATED ORAL at 17:42

## 2020-01-01 RX ADMIN — ACETAMINOPHEN 650 MG: 325 TABLET ORAL at 09:02

## 2020-01-01 RX ADMIN — HYDRALAZINE HYDROCHLORIDE 10 MG: 20 INJECTION INTRAMUSCULAR; INTRAVENOUS at 04:49

## 2020-01-01 RX ADMIN — ACETAMINOPHEN 650 MG: 325 TABLET ORAL at 21:33

## 2020-01-01 RX ADMIN — HYDRALAZINE HYDROCHLORIDE 25 MG: 25 TABLET, FILM COATED ORAL at 09:17

## 2020-01-01 RX ADMIN — Medication: at 22:05

## 2020-01-01 RX ADMIN — SODIUM CHLORIDE 10 ML: 9 INJECTION INTRAMUSCULAR; INTRAVENOUS; SUBCUTANEOUS at 22:04

## 2020-01-01 RX ADMIN — HYDRALAZINE HYDROCHLORIDE 50 MG: 25 TABLET, FILM COATED ORAL at 17:42

## 2020-01-01 RX ADMIN — NITROGLYCERIN 1 INCH: 20 OINTMENT TOPICAL at 23:09

## 2020-01-01 RX ADMIN — Medication 10 ML: at 15:58

## 2020-01-01 RX ADMIN — PATIROMER 16.8 G: 8.4 POWDER, FOR SUSPENSION ORAL at 09:06

## 2020-01-01 RX ADMIN — FUROSEMIDE 40 MG: 40 TABLET ORAL at 12:03

## 2020-01-01 RX ADMIN — FUROSEMIDE 40 MG: 40 TABLET ORAL at 11:42

## 2020-01-01 RX ADMIN — FUROSEMIDE 40 MG: 10 INJECTION, SOLUTION INTRAMUSCULAR; INTRAVENOUS at 12:25

## 2020-01-01 RX ADMIN — MYCOPHENOLIC ACID 360 MG: 360 TABLET, DELAYED RELEASE ORAL at 06:34

## 2020-01-01 RX ADMIN — HYDRALAZINE HYDROCHLORIDE 10 MG: 20 INJECTION INTRAMUSCULAR; INTRAVENOUS at 06:19

## 2020-01-01 RX ADMIN — AMLODIPINE BESYLATE 10 MG: 5 TABLET ORAL at 08:46

## 2020-01-01 RX ADMIN — CLONIDINE HYDROCHLORIDE 0.1 MG: 0.1 TABLET ORAL at 09:01

## 2020-01-01 RX ADMIN — ACETAMINOPHEN 650 MG: 325 TABLET ORAL at 14:38

## 2020-01-01 RX ADMIN — DEXAMETHASONE SODIUM PHOSPHATE 4 MG: 4 INJECTION, SOLUTION INTRAMUSCULAR; INTRAVENOUS at 06:34

## 2020-01-01 RX ADMIN — TACROLIMUS 1 MG: 1 CAPSULE ORAL at 08:47

## 2020-01-01 RX ADMIN — HYDRALAZINE HYDROCHLORIDE 50 MG: 25 TABLET, FILM COATED ORAL at 09:28

## 2020-01-01 RX ADMIN — MIDAZOLAM 2 MG: 1 INJECTION INTRAMUSCULAR; INTRAVENOUS at 19:41

## 2020-01-01 RX ADMIN — METOPROLOL TARTRATE 25 MG: 25 TABLET, FILM COATED ORAL at 08:21

## 2020-01-01 RX ADMIN — HYDRALAZINE HYDROCHLORIDE 10 MG: 20 INJECTION INTRAMUSCULAR; INTRAVENOUS at 21:58

## 2020-01-01 RX ADMIN — SODIUM CHLORIDE 1000 ML: 9 INJECTION, SOLUTION INTRAVENOUS at 10:47

## 2020-01-01 RX ADMIN — Medication 2 TABLET: at 10:03

## 2020-01-01 RX ADMIN — SODIUM CHLORIDE 10 ML: 9 INJECTION INTRAMUSCULAR; INTRAVENOUS; SUBCUTANEOUS at 06:50

## 2020-01-01 RX ADMIN — MYCOPHENOLATE MOFETIL 500 MG: 250 CAPSULE ORAL at 09:29

## 2020-01-01 RX ADMIN — MYCOPHENOLATE MOFETIL 250 MG: 250 CAPSULE ORAL at 18:18

## 2020-01-01 RX ADMIN — INSULIN LISPRO 7 UNITS: 100 INJECTION, SOLUTION INTRAVENOUS; SUBCUTANEOUS at 18:54

## 2020-01-01 RX ADMIN — ENOXAPARIN SODIUM 30 MG: 30 INJECTION SUBCUTANEOUS at 18:19

## 2020-01-01 RX ADMIN — HYDRALAZINE HYDROCHLORIDE 10 MG: 20 INJECTION INTRAMUSCULAR; INTRAVENOUS at 22:57

## 2020-01-01 RX ADMIN — HYDRALAZINE HYDROCHLORIDE 50 MG: 50 TABLET, FILM COATED ORAL at 09:03

## 2020-01-01 RX ADMIN — SODIUM CHLORIDE 10 ML: 9 INJECTION INTRAMUSCULAR; INTRAVENOUS; SUBCUTANEOUS at 13:44

## 2020-01-01 RX ADMIN — INSULIN LISPRO 4 UNITS: 100 INJECTION, SOLUTION INTRAVENOUS; SUBCUTANEOUS at 12:21

## 2020-01-01 RX ADMIN — LIDOCAINE HYDROCHLORIDE 20 MG: 20 INJECTION, SOLUTION EPIDURAL; INFILTRATION; INTRACAUDAL; PERINEURAL at 09:37

## 2020-01-01 RX ADMIN — METHYLPREDNISOLONE SODIUM SUCCINATE 40 MG: 40 INJECTION, POWDER, FOR SOLUTION INTRAMUSCULAR; INTRAVENOUS at 09:15

## 2020-01-01 RX ADMIN — TACROLIMUS 1 MG: 1 CAPSULE ORAL at 20:54

## 2020-01-01 RX ADMIN — ACETAMINOPHEN 650 MG: 325 TABLET ORAL at 13:45

## 2020-01-01 RX ADMIN — LABETALOL HYDROCHLORIDE 20 MG: 5 INJECTION, SOLUTION INTRAVENOUS at 11:57

## 2020-01-01 RX ADMIN — ZINC SULFATE 220 MG (50 MG) CAPSULE 1 CAPSULE: CAPSULE at 08:46

## 2020-01-01 RX ADMIN — CEFEPIME HYDROCHLORIDE 2 G: 2 INJECTION, POWDER, FOR SOLUTION INTRAVENOUS at 20:09

## 2020-01-01 RX ADMIN — Medication 10 ML: at 06:50

## 2020-01-01 RX ADMIN — TACROLIMUS 1 MG: 1 CAPSULE ORAL at 20:21

## 2020-01-01 RX ADMIN — AMLODIPINE BESYLATE 10 MG: 5 TABLET ORAL at 10:03

## 2020-01-01 RX ADMIN — NYSTATIN 500000 UNITS: 100000 SUSPENSION ORAL at 10:03

## 2020-01-01 RX ADMIN — SODIUM CHLORIDE 10 ML: 9 INJECTION INTRAMUSCULAR; INTRAVENOUS; SUBCUTANEOUS at 05:37

## 2020-01-01 RX ADMIN — METOPROLOL TARTRATE 50 MG: 50 TABLET, FILM COATED ORAL at 09:17

## 2020-01-01 RX ADMIN — ALLOPURINOL 100 MG: 100 TABLET ORAL at 18:17

## 2020-01-01 RX ADMIN — ISOSORBIDE MONONITRATE 60 MG: 60 TABLET, EXTENDED RELEASE ORAL at 15:38

## 2020-01-01 RX ADMIN — Medication 10 ML: at 13:44

## 2020-01-01 RX ADMIN — Medication: at 05:35

## 2020-01-01 RX ADMIN — ENOXAPARIN SODIUM 30 MG: 30 INJECTION SUBCUTANEOUS at 18:17

## 2020-01-01 RX ADMIN — Medication: at 14:22

## 2020-01-01 RX ADMIN — METHYLPREDNISOLONE SODIUM SUCCINATE 40 MG: 40 INJECTION, POWDER, FOR SOLUTION INTRAMUSCULAR; INTRAVENOUS at 09:02

## 2020-01-01 RX ADMIN — SODIUM CHLORIDE 100 ML/HR: 900 INJECTION, SOLUTION INTRAVENOUS at 21:06

## 2020-01-01 RX ADMIN — LORAZEPAM 0.5 MG: 0.5 TABLET ORAL at 16:30

## 2020-01-01 RX ADMIN — Medication 10 ML: at 21:34

## 2020-01-01 RX ADMIN — MYCOPHENOLATE MOFETIL 250 MG: 250 CAPSULE ORAL at 18:19

## 2020-01-01 RX ADMIN — MYCOPHENOLATE MOFETIL 250 MG: 250 CAPSULE ORAL at 21:34

## 2020-01-01 RX ADMIN — SODIUM CHLORIDE 10 ML: 9 INJECTION INTRAMUSCULAR; INTRAVENOUS; SUBCUTANEOUS at 18:21

## 2020-01-01 RX ADMIN — AMLODIPINE BESYLATE 10 MG: 5 TABLET ORAL at 09:03

## 2020-01-01 RX ADMIN — HYDRALAZINE HYDROCHLORIDE 25 MG: 25 TABLET, FILM COATED ORAL at 08:46

## 2020-01-01 RX ADMIN — SODIUM CHLORIDE 10 ML: 9 INJECTION INTRAMUSCULAR; INTRAVENOUS; SUBCUTANEOUS at 15:58

## 2020-01-01 RX ADMIN — ZINC SULFATE 220 MG (50 MG) CAPSULE 1 CAPSULE: CAPSULE at 09:18

## 2020-01-01 RX ADMIN — HYDRALAZINE HYDROCHLORIDE 50 MG: 50 TABLET, FILM COATED ORAL at 09:22

## 2020-01-01 RX ADMIN — DEXAMETHASONE SODIUM PHOSPHATE 4 MG: 4 INJECTION, SOLUTION INTRAMUSCULAR; INTRAVENOUS at 18:54

## 2020-01-01 RX ADMIN — OXYCODONE HYDROCHLORIDE AND ACETAMINOPHEN 500 MG: 500 TABLET ORAL at 09:16

## 2020-01-01 RX ADMIN — PREDNISONE 7.5 MG: 5 TABLET ORAL at 08:36

## 2020-01-01 RX ADMIN — METOPROLOL TARTRATE 50 MG: 50 TABLET, FILM COATED ORAL at 18:17

## 2020-01-01 RX ADMIN — MYCOPHENOLATE MOFETIL 250 MG: 250 CAPSULE ORAL at 16:50

## 2020-01-01 RX ADMIN — FUROSEMIDE 20 MG: 10 INJECTION, SOLUTION INTRAMUSCULAR; INTRAVENOUS at 09:03

## 2020-01-01 RX ADMIN — SODIUM BICARBONATE 1300 MG: 650 TABLET ORAL at 21:33

## 2020-01-01 RX ADMIN — ACETAMINOPHEN 650 MG: 325 TABLET ORAL at 04:28

## 2020-01-01 RX ADMIN — HYDRALAZINE HYDROCHLORIDE 25 MG: 25 TABLET, FILM COATED ORAL at 18:21

## 2020-01-01 RX ADMIN — METOPROLOL TARTRATE 25 MG: 25 TABLET, FILM COATED ORAL at 09:29

## 2020-01-01 RX ADMIN — Medication: at 06:50

## 2020-01-01 RX ADMIN — SODIUM CHLORIDE 40 MG: 9 INJECTION, SOLUTION INTRAMUSCULAR; INTRAVENOUS; SUBCUTANEOUS at 10:04

## 2020-01-01 RX ADMIN — RITUXIMAB 645 MG: 10 INJECTION, SOLUTION INTRAVENOUS at 15:17

## 2020-01-01 RX ADMIN — FLUCONAZOLE 100 MG: 200 INJECTION, SOLUTION INTRAVENOUS at 18:54

## 2020-01-01 RX ADMIN — PIPERACILLIN AND TAZOBACTAM 3.38 G: 3; .375 INJECTION, POWDER, LYOPHILIZED, FOR SOLUTION INTRAVENOUS at 12:02

## 2020-01-01 RX ADMIN — HYDRALAZINE HYDROCHLORIDE 50 MG: 50 TABLET, FILM COATED ORAL at 16:20

## 2020-01-01 RX ADMIN — METHYLPREDNISOLONE SODIUM SUCCINATE 40 MG: 40 INJECTION, POWDER, FOR SOLUTION INTRAMUSCULAR; INTRAVENOUS at 08:46

## 2020-01-01 RX ADMIN — TACROLIMUS 1 MG: 1 CAPSULE ORAL at 21:13

## 2020-01-01 RX ADMIN — AMLODIPINE BESYLATE 10 MG: 5 TABLET ORAL at 09:01

## 2020-01-01 RX ADMIN — Medication: at 22:27

## 2020-01-01 RX ADMIN — METHYLPREDNISOLONE SODIUM SUCCINATE 40 MG: 40 INJECTION, POWDER, FOR SOLUTION INTRAMUSCULAR; INTRAVENOUS at 18:50

## 2020-01-01 RX ADMIN — SODIUM CHLORIDE 40 MG: 9 INJECTION INTRAMUSCULAR; INTRAVENOUS; SUBCUTANEOUS at 06:21

## 2020-01-01 RX ADMIN — METOPROLOL TARTRATE 50 MG: 50 TABLET, FILM COATED ORAL at 21:09

## 2020-01-01 RX ADMIN — HYDRALAZINE HYDROCHLORIDE 25 MG: 50 TABLET, FILM COATED ORAL at 22:25

## 2020-01-01 RX ADMIN — INSULIN LISPRO 2 UNITS: 100 INJECTION, SOLUTION INTRAVENOUS; SUBCUTANEOUS at 00:15

## 2020-01-01 RX ADMIN — MYCOPHENOLATE MOFETIL 500 MG: 250 CAPSULE ORAL at 11:41

## 2020-01-01 RX ADMIN — ZOLPIDEM TARTRATE 5 MG: 5 TABLET ORAL at 21:17

## 2020-01-01 RX ADMIN — Medication 2 TABLET: at 09:22

## 2020-01-01 RX ADMIN — OXYCODONE HYDROCHLORIDE AND ACETAMINOPHEN 500 MG: 500 TABLET ORAL at 10:03

## 2020-01-01 RX ADMIN — METOPROLOL TARTRATE 50 MG: 50 TABLET, FILM COATED ORAL at 09:03

## 2020-01-01 RX ADMIN — INSULIN LISPRO 5 UNITS: 100 INJECTION, SOLUTION INTRAVENOUS; SUBCUTANEOUS at 09:06

## 2020-01-01 RX ADMIN — HYDRALAZINE HYDROCHLORIDE 25 MG: 25 TABLET, FILM COATED ORAL at 22:21

## 2020-01-01 RX ADMIN — SODIUM CHLORIDE 20 ML: 9 INJECTION INTRAMUSCULAR; INTRAVENOUS; SUBCUTANEOUS at 06:00

## 2020-01-01 RX ADMIN — TACROLIMUS 0.5 MG: 0.5 CAPSULE ORAL at 09:47

## 2020-01-01 RX ADMIN — SODIUM CHLORIDE 10 ML: 9 INJECTION INTRAMUSCULAR; INTRAVENOUS; SUBCUTANEOUS at 21:35

## 2020-01-01 RX ADMIN — METRONIDAZOLE 500 MG: 500 INJECTION, SOLUTION INTRAVENOUS at 05:36

## 2020-01-01 RX ADMIN — HYDRALAZINE HYDROCHLORIDE 100 MG: 50 TABLET, FILM COATED ORAL at 22:02

## 2020-01-01 RX ADMIN — LIDOCAINE HYDROCHLORIDE 20 MG: 20 INJECTION, SOLUTION EPIDURAL; INFILTRATION; INTRACAUDAL; PERINEURAL at 09:35

## 2020-01-01 RX ADMIN — OXYCODONE HYDROCHLORIDE 5 MG: 5 TABLET ORAL at 09:02

## 2020-01-01 RX ADMIN — HYDRALAZINE HYDROCHLORIDE 50 MG: 50 TABLET, FILM COATED ORAL at 16:46

## 2020-01-01 RX ADMIN — SODIUM CHLORIDE 10 ML: 9 INJECTION INTRAMUSCULAR; INTRAVENOUS; SUBCUTANEOUS at 07:47

## 2020-01-01 RX ADMIN — EPINEPHRINE 1 MG: 0.1 INJECTION INTRACARDIAC; INTRAVENOUS at 19:34

## 2020-01-01 RX ADMIN — INSULIN LISPRO 3 UNITS: 100 INJECTION, SOLUTION INTRAVENOUS; SUBCUTANEOUS at 10:04

## 2020-01-01 RX ADMIN — HYDRALAZINE HYDROCHLORIDE 50 MG: 50 TABLET, FILM COATED ORAL at 10:03

## 2020-01-01 RX ADMIN — CALCITRIOL CAPSULES 0.25 MCG 0.25 MCG: 0.25 CAPSULE ORAL at 09:43

## 2020-01-01 RX ADMIN — PROPOFOL 50 MG: 10 INJECTION, EMULSION INTRAVENOUS at 09:34

## 2020-01-01 RX ADMIN — METRONIDAZOLE 500 MG: 500 INJECTION, SOLUTION INTRAVENOUS at 21:30

## 2020-01-01 RX ADMIN — ZINC SULFATE 220 MG (50 MG) CAPSULE 1 CAPSULE: CAPSULE at 12:30

## 2020-01-01 RX ADMIN — SODIUM CHLORIDE: 9 INJECTION, SOLUTION INTRAVENOUS at 09:24

## 2020-01-01 RX ADMIN — HYDRALAZINE HYDROCHLORIDE 10 MG: 20 INJECTION INTRAMUSCULAR; INTRAVENOUS at 23:57

## 2020-01-01 RX ADMIN — Medication 10 ML: at 14:14

## 2020-01-01 RX ADMIN — MYCOPHENOLATE MOFETIL 250 MG: 250 CAPSULE ORAL at 06:19

## 2020-01-01 RX ADMIN — DEXAMETHASONE SODIUM PHOSPHATE 10 MG: 10 INJECTION INTRAMUSCULAR; INTRAVENOUS at 13:45

## 2020-01-01 RX ADMIN — VANCOMYCIN HYDROCHLORIDE 125 MG: KIT at 06:55

## 2020-01-01 RX ADMIN — FENTANYL CITRATE 100 MCG: 50 INJECTION, SOLUTION INTRAMUSCULAR; INTRAVENOUS at 19:41

## 2020-01-01 RX ADMIN — Medication: at 21:33

## 2020-01-01 RX ADMIN — SODIUM CHLORIDE 25 ML/HR: 900 INJECTION, SOLUTION INTRAVENOUS at 13:47

## 2020-01-01 RX ADMIN — LORAZEPAM 1 MG: 2 INJECTION INTRAMUSCULAR; INTRAVENOUS at 22:09

## 2020-01-01 RX ADMIN — OXYCODONE HYDROCHLORIDE AND ACETAMINOPHEN 500 MG: 500 TABLET ORAL at 17:25

## 2020-01-01 RX ADMIN — INSULIN LISPRO 2 UNITS: 100 INJECTION, SOLUTION INTRAVENOUS; SUBCUTANEOUS at 07:02

## 2020-01-01 RX ADMIN — DOXYCYCLINE 100 MG: 100 INJECTION, POWDER, LYOPHILIZED, FOR SOLUTION INTRAVENOUS at 22:02

## 2020-01-01 RX ADMIN — NYSTATIN 500000 UNITS: 100000 SUSPENSION ORAL at 17:13

## 2020-01-01 RX ADMIN — OXYCODONE HYDROCHLORIDE AND ACETAMINOPHEN 500 MG: 500 TABLET ORAL at 09:01

## 2020-01-01 RX ADMIN — PIPERACILLIN AND TAZOBACTAM 3.38 G: 3; .375 INJECTION, POWDER, LYOPHILIZED, FOR SOLUTION INTRAVENOUS at 17:25

## 2020-01-01 RX ADMIN — SODIUM BICARBONATE 1300 MG: 650 TABLET ORAL at 08:35

## 2020-01-01 RX ADMIN — ALLOPURINOL 100 MG: 100 TABLET ORAL at 10:03

## 2020-01-01 RX ADMIN — DEXTROSE MONOHYDRATE 250 ML: 10 INJECTION, SOLUTION INTRAVENOUS at 13:43

## 2020-01-01 RX ADMIN — SODIUM CHLORIDE 50 ML/HR: 900 INJECTION, SOLUTION INTRAVENOUS at 22:43

## 2020-01-01 RX ADMIN — LIDOCAINE HYDROCHLORIDE 40 MG: 20 INJECTION, SOLUTION EPIDURAL; INFILTRATION; INTRACAUDAL; PERINEURAL at 09:33

## 2020-01-01 RX ADMIN — FLUTICASONE PROPIONATE 2 SPRAY: 50 SPRAY, METERED NASAL at 09:32

## 2020-01-01 RX ADMIN — OXYCODONE 5 MG: 5 TABLET ORAL at 03:24

## 2020-01-01 RX ADMIN — CALCITRIOL 0.25 MCG: 0.25 CAPSULE ORAL at 08:35

## 2020-01-01 RX ADMIN — PROPOFOL 50 MG: 10 INJECTION, EMULSION INTRAVENOUS at 09:36

## 2020-01-01 RX ADMIN — ACETAMINOPHEN 650 MG: 325 TABLET ORAL at 04:56

## 2020-01-01 RX ADMIN — METOPROLOL TARTRATE 50 MG: 50 TABLET, FILM COATED ORAL at 18:19

## 2020-01-01 RX ADMIN — TACROLIMUS 2 MG: 1 CAPSULE ORAL at 20:31

## 2020-01-01 RX ADMIN — TACROLIMUS 1 MG: 1 CAPSULE ORAL at 09:28

## 2020-01-01 RX ADMIN — INSULIN GLARGINE 7 UNITS: 100 INJECTION, SOLUTION SUBCUTANEOUS at 12:32

## 2020-01-01 RX ADMIN — MYCOPHENOLIC ACID 360 MG: 360 TABLET, DELAYED RELEASE ORAL at 15:37

## 2020-01-01 RX ADMIN — METOPROLOL TARTRATE 50 MG: 50 TABLET, FILM COATED ORAL at 17:13

## 2020-01-01 RX ADMIN — ENOXAPARIN SODIUM 30 MG: 30 INJECTION SUBCUTANEOUS at 21:04

## 2020-01-01 RX ADMIN — HYDRALAZINE HYDROCHLORIDE 10 MG: 20 INJECTION INTRAMUSCULAR; INTRAVENOUS at 00:11

## 2020-01-01 RX ADMIN — DOXYCYCLINE 100 MG: 100 INJECTION, POWDER, LYOPHILIZED, FOR SOLUTION INTRAVENOUS at 09:04

## 2020-01-01 RX ADMIN — ZOLPIDEM TARTRATE 5 MG: 5 TABLET ORAL at 21:33

## 2020-01-01 RX ADMIN — HYDRALAZINE HYDROCHLORIDE 50 MG: 25 TABLET, FILM COATED ORAL at 21:17

## 2020-01-01 RX ADMIN — PIPERACILLIN AND TAZOBACTAM 3.38 G: 3; .375 INJECTION, POWDER, LYOPHILIZED, FOR SOLUTION INTRAVENOUS at 12:25

## 2020-01-01 RX ADMIN — ACETAMINOPHEN 650 MG: 325 TABLET ORAL at 16:52

## 2020-01-01 RX ADMIN — SODIUM CHLORIDE 40 MG: 9 INJECTION, SOLUTION INTRAMUSCULAR; INTRAVENOUS; SUBCUTANEOUS at 22:24

## 2020-01-01 RX ADMIN — METHYLPREDNISOLONE SODIUM SUCCINATE 40 MG: 40 INJECTION, POWDER, FOR SOLUTION INTRAMUSCULAR; INTRAVENOUS at 10:03

## 2020-01-01 RX ADMIN — INSULIN LISPRO 5 UNITS: 100 INJECTION, SOLUTION INTRAVENOUS; SUBCUTANEOUS at 13:22

## 2020-01-01 RX ADMIN — INSULIN LISPRO 3 UNITS: 100 INJECTION, SOLUTION INTRAVENOUS; SUBCUTANEOUS at 16:47

## 2020-01-01 RX ADMIN — TACROLIMUS 1 MG: 1 CAPSULE ORAL at 08:43

## 2020-01-01 RX ADMIN — ENOXAPARIN SODIUM 30 MG: 30 INJECTION SUBCUTANEOUS at 17:17

## 2020-01-01 RX ADMIN — HYDROXYZINE HYDROCHLORIDE 10 MG: 10 TABLET, FILM COATED ORAL at 10:14

## 2020-01-01 RX ADMIN — INSULIN LISPRO 8 UNITS: 100 INJECTION, SOLUTION INTRAVENOUS; SUBCUTANEOUS at 17:49

## 2020-01-01 RX ADMIN — INSULIN LISPRO 2 UNITS: 100 INJECTION, SOLUTION INTRAVENOUS; SUBCUTANEOUS at 12:16

## 2020-01-01 RX ADMIN — SODIUM CHLORIDE 10 ML: 9 INJECTION INTRAMUSCULAR; INTRAVENOUS; SUBCUTANEOUS at 22:29

## 2020-01-01 RX ADMIN — ISOSORBIDE MONONITRATE 60 MG: 60 TABLET, EXTENDED RELEASE ORAL at 08:38

## 2020-01-01 RX ADMIN — ALLOPURINOL 100 MG: 100 TABLET ORAL at 17:13

## 2020-01-01 RX ADMIN — METOPROLOL TARTRATE 50 MG: 50 TABLET, FILM COATED ORAL at 17:25

## 2020-01-01 RX ADMIN — ENOXAPARIN SODIUM 30 MG: 30 INJECTION SUBCUTANEOUS at 18:59

## 2020-01-01 RX ADMIN — NITROGLYCERIN 1 INCH: 20 OINTMENT TOPICAL at 16:47

## 2020-01-01 RX ADMIN — Medication 81 MG: at 08:46

## 2020-01-01 RX ADMIN — HYDRALAZINE HYDROCHLORIDE 50 MG: 25 TABLET, FILM COATED ORAL at 17:30

## 2020-01-01 RX ADMIN — TACROLIMUS 2 MG: 1 CAPSULE ORAL at 09:17

## 2020-01-01 RX ADMIN — AMLODIPINE BESYLATE 10 MG: 5 TABLET ORAL at 09:22

## 2020-01-01 RX ADMIN — SODIUM CHLORIDE 100 ML/HR: 900 INJECTION, SOLUTION INTRAVENOUS at 08:00

## 2020-01-01 RX ADMIN — ACETAMINOPHEN 650 MG: 325 TABLET ORAL at 20:05

## 2020-01-01 RX ADMIN — TACROLIMUS 1.5 MG: 1 CAPSULE ORAL at 10:55

## 2020-01-01 RX ADMIN — METRONIDAZOLE 500 MG: 500 INJECTION, SOLUTION INTRAVENOUS at 15:17

## 2020-01-01 RX ADMIN — HYDRALAZINE HYDROCHLORIDE 10 MG: 20 INJECTION INTRAMUSCULAR; INTRAVENOUS at 06:44

## 2020-01-01 RX ADMIN — TACROLIMUS 1 MG: 1 CAPSULE ORAL at 21:17

## 2020-01-01 RX ADMIN — INSULIN LISPRO 5 UNITS: 100 INJECTION, SOLUTION INTRAVENOUS; SUBCUTANEOUS at 18:23

## 2020-01-01 RX ADMIN — TACROLIMUS 2 MG: 1 CAPSULE ORAL at 09:22

## 2020-01-01 RX ADMIN — Medication: at 07:47

## 2020-01-01 RX ADMIN — ACETAMINOPHEN 650 MG: 325 TABLET ORAL at 21:36

## 2020-01-01 RX ADMIN — SODIUM BICARBONATE 1300 MG: 650 TABLET ORAL at 21:17

## 2020-01-01 RX ADMIN — TACROLIMUS 2 MG: 1 CAPSULE ORAL at 21:49

## 2020-01-01 RX ADMIN — PATIROMER 8.4 G: 8.4 POWDER, FOR SUSPENSION ORAL at 10:14

## 2020-01-01 RX ADMIN — PIPERACILLIN AND TAZOBACTAM 3.38 G: 3; .375 INJECTION, POWDER, LYOPHILIZED, FOR SOLUTION INTRAVENOUS at 10:14

## 2020-01-01 RX ADMIN — HYDRALAZINE HYDROCHLORIDE 50 MG: 25 TABLET, FILM COATED ORAL at 08:37

## 2020-01-01 RX ADMIN — INFLUENZA VIRUS VACCINE 0.5 ML: 15; 15; 15; 15 SUSPENSION INTRAMUSCULAR at 17:20

## 2020-01-01 RX ADMIN — INSULIN LISPRO 2 UNITS: 100 INJECTION, SOLUTION INTRAVENOUS; SUBCUTANEOUS at 11:50

## 2020-01-01 RX ADMIN — GEMFIBROZIL 600 MG: 600 TABLET ORAL at 09:43

## 2020-01-01 RX ADMIN — FUROSEMIDE 40 MG: 40 TABLET ORAL at 21:09

## 2020-01-01 RX ADMIN — Medication 10 ML: at 05:21

## 2020-01-01 RX ADMIN — Medication 5 ML: at 01:11

## 2020-01-01 RX ADMIN — ALLOPURINOL 100 MG: 100 TABLET ORAL at 17:25

## 2020-01-01 RX ADMIN — FUROSEMIDE 80 MG: 40 TABLET ORAL at 09:28

## 2020-01-01 RX ADMIN — HALOPERIDOL LACTATE 10 MG: 5 INJECTION, SOLUTION INTRAMUSCULAR at 19:27

## 2020-01-01 RX ADMIN — LABETALOL HYDROCHLORIDE 20 MG: 5 INJECTION, SOLUTION INTRAVENOUS at 00:41

## 2020-01-01 RX ADMIN — OXYCODONE HYDROCHLORIDE 5 MG: 5 TABLET ORAL at 14:37

## 2020-01-01 RX ADMIN — Medication: at 15:57

## 2020-01-01 RX ADMIN — Medication: at 21:50

## 2020-01-01 RX ADMIN — METOPROLOL TARTRATE 50 MG: 50 TABLET, FILM COATED ORAL at 12:21

## 2020-01-01 RX ADMIN — INSULIN LISPRO 2 UNITS: 100 INJECTION, SOLUTION INTRAVENOUS; SUBCUTANEOUS at 09:13

## 2020-01-01 RX ADMIN — FUROSEMIDE 20 MG: 10 INJECTION, SOLUTION INTRAMUSCULAR; INTRAVENOUS at 15:45

## 2020-01-01 RX ADMIN — TACROLIMUS 2 MG: 1 CAPSULE ORAL at 10:03

## 2020-01-01 RX ADMIN — HYDRALAZINE HYDROCHLORIDE 50 MG: 50 TABLET, FILM COATED ORAL at 21:30

## 2020-01-01 RX ADMIN — ZINC SULFATE 220 MG (50 MG) CAPSULE 1 CAPSULE: CAPSULE at 09:16

## 2020-01-01 RX ADMIN — VANCOMYCIN HYDROCHLORIDE 125 MG: KIT at 17:31

## 2020-01-01 RX ADMIN — MYCOPHENOLATE MOFETIL 250 MG: 250 CAPSULE ORAL at 07:56

## 2020-01-01 RX ADMIN — Medication 2 TABLET: at 08:46

## 2020-01-01 RX ADMIN — Medication 3 MG: at 21:00

## 2020-01-01 RX ADMIN — METRONIDAZOLE 500 MG: 500 INJECTION, SOLUTION INTRAVENOUS at 22:06

## 2020-01-01 RX ADMIN — Medication 10 ML: at 06:45

## 2020-01-01 RX ADMIN — ALBUTEROL SULFATE 2 PUFF: 90 AEROSOL, METERED RESPIRATORY (INHALATION) at 12:38

## 2020-10-08 NOTE — ED PROVIDER NOTES
EMERGENCY DEPARTMENT HISTORY AND PHYSICAL EXAM 
 
 
Date: 10/8/2020 Patient Name: Frank Brewer History of Presenting Illness Chief Complaint Patient presents with  Hypertension History Provided By: Patient and Caregiver HPI: Frank Brewer, 69 y.o. female with a past medical history significant hypertension presents to the ED with chief complaint of Hypertension Vesta Lissette Patient did not take her blood pressure meds this morning because she held it for an Endo procedure. Procedure being done for dysphagia. They are unable to do the procedure in an Endo because she had a mild dull headache in route. No syncope. No nausea or vomiting. No recent black or bloody stool. No new chest pain or shortness of breath. Has been compliant on pressure medication last night. There are no other complaints, changes, or physical findings at this time. PCP: Isaiah Beal MD 
 
Current Outpatient Medications Medication Sig Dispense Refill  calcitRIOL (ROCALTROL) 0.25 mcg capsule Take 0.25 mcg by mouth daily.  hydrALAZINE (APRESOLINE) 50 mg tablet Take 25 mg by mouth three (3) times daily.  isosorbide mononitrate ER (IMDUR) 60 mg CR tablet Take  by mouth every morning.  metoprolol succinate (TOPROL-XL) 50 mg XL tablet Take  by mouth daily.  amLODIPine-atorvastatin (CADUET) 10-10 mg per tablet Take 1 Tab by mouth daily.  mycophenolic acid (MYFORTIC) 108 mg delayed release tablet Take  by mouth two (2) times a day.  omeprazole (PRILOSEC) 40 mg capsule Take 40 mg by mouth daily.  tacrolimus (PROGRAF) 1 mg capsule Take  by mouth every twelve (12) hours.  sodium bicarbonate 650 mg tablet Take  by mouth four (4) times daily.  gemfibroziL (LOPID) 600 mg tablet Take  by mouth two (2) times a day.  predniSONE (DELTASONE) 2.5 mg tablet Take  by mouth daily (with breakfast).  aspirin delayed-release 81 mg tablet Take  by mouth daily.     
 cholecalciferol (VITAMIN D3) (2,000 UNITS /50 MCG) cap capsule Take  by mouth two (2) times a day. Past History Past Medical History: 
Past Medical History:  
Diagnosis Date  Chronic kidney disease  Diabetes ([facility] [address]  Heart failure ([facility] [address]  Hypertension  Kidney disease  Sleep apnea Past Surgical History: 
Past Surgical History:  
Procedure Laterality Date  HX CHOLECYSTECTOMY  HX RENAL TRANSPLANT Family History: No family history on file. Social History: 
Social History Tobacco Use  Smoking status: Never Smoker  Smokeless tobacco: Never Used Substance Use Topics  Alcohol use: Never Frequency: Never  Drug use: Never Allergies: Allergies Allergen Reactions  Benadryl [Diphenhydramine Hcl] Other (comments)  Fentanyl Other (comments)  Morphine Unknown (comments)  Versed [Midazolam] Other (comments) Review of Systems Review of Systems Constitutional: Negative. Negative for chills, fatigue and fever. HENT: Negative. Negative for congestion, nosebleeds and sore throat. Eyes: Negative. Negative for pain, discharge and visual disturbance. Respiratory: Negative. Negative for cough, chest tightness and shortness of breath. Cardiovascular: Negative for chest pain, palpitations and leg swelling. Gastrointestinal: Negative for abdominal pain, blood in stool, constipation, diarrhea, nausea and vomiting. Endocrine: Negative. Genitourinary: Negative. Negative for difficulty urinating, dysuria, pelvic pain and vaginal bleeding. Musculoskeletal: Negative. Negative for arthralgias, back pain and myalgias. Skin: Negative. Negative for rash and wound. Allergic/Immunologic: Negative. Neurological: Positive for headaches. Negative for dizziness, syncope, weakness and numbness. Hematological: Negative. Psychiatric/Behavioral: Negative. Negative for agitation, confusion and suicidal ideas.   
All other systems reviewed and are negative. Physical Exam  
Physical Exam 
Vitals signs and nursing note reviewed. Exam conducted with a chaperone present. Constitutional:   
   Appearance: Normal appearance. She is normal weight. HENT:  
   Head: Normocephalic and atraumatic. Nose: Nose normal.  
   Mouth/Throat:  
   Mouth: Mucous membranes are moist.  
   Pharynx: Oropharynx is clear. Eyes:  
   Extraocular Movements: Extraocular movements intact. Conjunctiva/sclera: Conjunctivae normal.  
   Pupils: Pupils are equal, round, and reactive to light. Neck: Musculoskeletal: Normal range of motion and neck supple. Cardiovascular:  
   Rate and Rhythm: Normal rate and regular rhythm. Pulses: Normal pulses. Heart sounds: Normal heart sounds. Pulmonary:  
   Effort: Pulmonary effort is normal. No respiratory distress. Breath sounds: Normal breath sounds. Abdominal:  
   General: Abdomen is flat. Bowel sounds are normal. There is no distension. Palpations: Abdomen is soft. Tenderness: There is no abdominal tenderness. There is no guarding. Musculoskeletal: Normal range of motion. General: No swelling, tenderness, deformity or signs of injury. Right lower leg: No edema. Left lower leg: No edema. Skin: 
   General: Skin is warm and dry. Capillary Refill: Capillary refill takes less than 2 seconds. Findings: No lesion or rash. Neurological:  
   General: No focal deficit present. Mental Status: She is alert and oriented to person, place, and time. Mental status is at baseline. Cranial Nerves: No cranial nerve deficit. Psychiatric:     
   Mood and Affect: Mood normal.     
   Behavior: Behavior normal.     
   Thought Content: Thought content normal.     
   Judgment: Judgment normal.  
 
 
 
Diagnostic Study Results Labs - Recent Results (from the past 12 hour(s)) CBC WITH AUTOMATED DIFF  Collection Time: 10/08/20  9:30 AM  
Result Value Ref Range WBC 3.8 3.6 - 11.0 K/uL  
 RBC 3.30 (L) 3.80 - 5.20 M/uL HGB 9.5 (L) 11.5 - 16.0 g/dL HCT 31.4 (L) 35.0 - 47.0 % MCV 95.2 80.0 - 99.0 FL  
 MCH 28.8 26.0 - 34.0 PG  
 MCHC 30.3 30.0 - 36.5 g/dL  
 RDW 16.8 (H) 11.5 - 14.5 % PLATELET 250 136 - 929 K/uL MPV 10.6 8.9 - 12.9 FL  
 NEUTROPHILS 64 32 - 75 % LYMPHOCYTES 18 12 - 49 % MONOCYTES 17 (H) 5 - 13 % EOSINOPHILS 1 0 - 7 % BASOPHILS 0 0 - 1 % IMMATURE GRANULOCYTES 0 0.0 - 0.5 % ABS. NEUTROPHILS 2.4 1.8 - 8.0 K/UL  
 ABS. LYMPHOCYTES 0.7 (L) 0.8 - 3.5 K/UL  
 ABS. MONOCYTES 0.7 0.0 - 1.0 K/UL  
 ABS. EOSINOPHILS 0.1 0.0 - 0.4 K/UL  
 ABS. BASOPHILS 0.0 0.0 - 0.1 K/UL  
 ABS. IMM. GRANS. 0.0 0.00 - 0.04 K/UL  
 DF AUTOMATED METABOLIC PANEL, COMPREHENSIVE Collection Time: 10/08/20  9:30 AM  
Result Value Ref Range Sodium 139 136 - 145 mmol/L Potassium 4.8 3.5 - 5.1 mmol/L Chloride 108 97 - 108 mmol/L  
 CO2 26 21 - 32 mmol/L Anion gap 5 5 - 15 mmol/L Glucose 106 (H) 65 - 100 mg/dL BUN 42 (H) 6 - 20 mg/dL Creatinine 2.01 (H) 0.55 - 1.02 mg/dL BUN/Creatinine ratio 21 (H) 12 - 20 GFR est AA 30 (L) >60 ml/min/1.73m2 GFR est non-AA 24 (L) >60 ml/min/1.73m2 Calcium 9.9 8.5 - 10.1 mg/dL Bilirubin, total 0.4 0.2 - 1.0 mg/dL AST (SGOT) 8 (L) 15 - 37 U/L  
 ALT (SGPT) <6 (L) 12 - 78 U/L Alk. phosphatase 98 45 - 117 U/L Protein, total 7.2 6.4 - 8.2 g/dL Albumin 3.2 (L) 3.5 - 5.0 g/dL Globulin 4.0 2.0 - 4.0 g/dL A-G Ratio 0.8 (L) 1.1 - 2.2 EKG, 12 LEAD, INITIAL Collection Time: 10/08/20  9:53 AM  
Result Value Ref Range Ventricular Rate 61 BPM  
 Atrial Rate 61 BPM  
 P-R Interval 160 ms QRS Duration 92 ms Q-T Interval 468 ms QTC Calculation (Bezet) 471 ms Calculated P Axis 58 degrees Calculated R Axis -9 degrees Calculated T Axis 119 degrees Diagnosis Normal sinus rhythm T wave abnormality, consider lateral ischemia Prolonged QT Abnormal ECG When compared with ECG of 31-JUL-2020 11:37, 
PREVIOUS ECG IS PRESENT Confirmed by Lyndia Sever MD, [address] [facility] ([phone]) on 10/8/2020 10:19:53 AM 
  
 
 
Radiologic Studies -  
XR CHEST SNGL V Final Result Impression:  
  
Stable mild cardiomegaly. No acute pulmonary process. CT HEAD WO CONT Final Result IMPRESSION:   
1. There is a mild degree of age-appropriate cerebral cortical volume loss and  
mild microvascular changes within the central white matter. These findings have  
remained stable. 2.  This examination is negative for acute intracranial pathology such as a  
hypertensive hemorrhage or an extra-axial collection. 3.  This examination is negative for intracranial masses or mass effect. CT Results  (Last 48 hours) 10/08/20 0939  CT HEAD WO CONT Final result Impression:  IMPRESSION:   
1. There is a mild degree of age-appropriate cerebral cortical volume loss and  
mild microvascular changes within the central white matter. These findings have  
remained stable. 2.  This examination is negative for acute intracranial pathology such as a  
hypertensive hemorrhage or an extra-axial collection. 3.  This examination is negative for intracranial masses or mass effect. Narrative: The study is a noncontrasted CT examination of the head dated 10/8/2020. HISTORY: Headaches. TECHNIQUE: Thin section axial imaging was performed followed by sagittal and  
coronal reconstructed imaging. Dose Reduction Technique was employed to reduce radiation exposure - This  
includes reduction optimization techniques as appropriate to a performed exam  
with automated exposure control adjustments of the mA and/or Kv according to  
patient size, or use of iterative reconstruction technique. COMPARISON: CT evaluation of the head dated 6/1/2020.   
   
The ventricles are normal in size with a midline position. There is a mild  
deepening of the deep cortical sulci and the sylvian fissures without change. This examination is negative for intracranial hemorrhage, mass effect or an  
extra axial collection. The gray-white matter junctions are preserved without  
cytotoxic or vasogenic edema. An assessment of the white matter tracts demonstrates mild decrease in the  
periventricular white matter density characteristics consistent with  
aging/minimal microvascular changes. This is unchanged. ORBITS: The patient's native left lens is not visualized secondary to previous  
cataract surgery for placement of a lens prosthesis. PARANASAL SINUSES: This examination is negative for acute sinus disease. The mastoid air cells and middle ear cavities image in a normal fashion. The  
sella and the suprasellar regions are unremarkable. The craniocervical junction  
images in a normal fashion. OTHER: None. CXR Results  (Last 48 hours) 10/08/20 1058  XR CHEST SNGL V Final result Impression:  Impression:  
   
Stable mild cardiomegaly. No acute pulmonary process. Narrative:  Examination: Frontal radiograph of the chest, 10/8/2020 11:55 AM  
   
Clinical information: Hypertension Comparison: 9/3/2020 Findings: There is a right subclavian vascular stent. Stable mild enlargement of the  
cardiac contours. No pulmonary vascular congestion. No focal pulmonary  
consolidation or infiltrate. No pleural effusion or pneumothorax. No acute  
osseous abnormality. Surgical clips are present in the medial right upper  
extremity soft tissues. Medical Decision Making and ED Course I am the first provider for this patient. I reviewed the vital signs, available nursing notes, past medical history, past surgical history, family history and social history. Vital Signs-Reviewed the patient's vital signs.  
Patient Vitals for the past 12 hrs: 
 Temp Pulse Resp BP SpO2  
10/08/20 1041  66 16 (!) 157/69 100 % 10/08/20 1002  60  (!) 205/86   
10/08/20 0916 98.1 °F (36.7 °C) (!) 56 16 (!) 205/84 100 % EKG interpretation:  
953a EKG interpretation: 
Normal sinus rhythm. Rate 61. No ST segment changes. No T wave Inversions. Prolong QTc. Interpreted by ED physician. Reason rule out dysarrythmia. Records Reviewed: Previous Hospital chart. EMS run report ED Course:  
Initial assessment performed. The patients presenting problems have been discussed, and they are in agreement with the care plan formulated and outlined with them. I have encouraged them to ask questions as they arise throughout their visit. Orders Placed This Encounter  CT HEAD WO CONT Standing Status:   Standing Number of Occurrences:   1 Order Specific Question:   Transport Answer:   BED [2] Order Specific Question:   Reason for Exam  
  Answer:   ha  
 XR CHEST SNGL V Standing Status:   Standing Number of Occurrences:   1 Order Specific Question:   Transport Answer:   BED [2] Order Specific Question:   Reason for Exam  
  Answer:   htn  CBC WITH AUTOMATED DIFF Standing Status:   Standing Number of Occurrences:   1  METABOLIC PANEL, COMPREHENSIVE Standing Status:   Standing Number of Occurrences:   1  EKG 12 LEAD INITIAL Standing Status:   Standing Number of Occurrences:   1 Order Specific Question:   Reason for Exam: Answer:   htn  hydrALAZINE (APRESOLINE) 20 mg/mL injection 20 mg  
 
 
 
  
 
CONSULTANTS: 
 
 
Provider Notes (Medical Decision Making):  
18-year-old female held blood pressure medication for an Endo procedure this morning which they canceled secondary to her blood pressure being high. Plan to control her blood pressure through the IV route rule out acute hypertensive urgency concerns.   We will see if she is able to go back to Endo if that has to be rescheduled. DD; hypertensive emergency. Medication noncompliance. Acute renal failure. ICH. 11:01 AM 
Patient remains n.p.o. and asymptomatic. Blood pressure controlled with 1 dose of IV hydralazine. We did call Endo and the GI doctor his left for the day unable to perform the endoscopy. Patient and family advised of this that they would have to reschedule. Patient discharged home stable vitals. Procedures Disposition Emergency Department Disposition:  Discharged DISCHARGE PLAN: 
 
Patient is discharged home. Discharge instructions provided. Patient is stable and improved at time of disposition. Vitals are stable. 1.  
Current Discharge Medication List  
  
CONTINUE these medications which have NOT CHANGED Details  
calcitRIOL (ROCALTROL) 0.25 mcg capsule Take 0.25 mcg by mouth daily. hydrALAZINE (APRESOLINE) 50 mg tablet Take 25 mg by mouth three (3) times daily. isosorbide mononitrate ER (IMDUR) 60 mg CR tablet Take  by mouth every morning. metoprolol succinate (TOPROL-XL) 50 mg XL tablet Take  by mouth daily. amLODIPine-atorvastatin (CADUET) 10-10 mg per tablet Take 1 Tab by mouth daily. mycophenolic acid (MYFORTIC) 096 mg delayed release tablet Take  by mouth two (2) times a day. omeprazole (PRILOSEC) 40 mg capsule Take 40 mg by mouth daily. tacrolimus (PROGRAF) 1 mg capsule Take  by mouth every twelve (12) hours. sodium bicarbonate 650 mg tablet Take  by mouth four (4) times daily. gemfibroziL (LOPID) 600 mg tablet Take  by mouth two (2) times a day. predniSONE (DELTASONE) 2.5 mg tablet Take  by mouth daily (with breakfast). aspirin delayed-release 81 mg tablet Take  by mouth daily. cholecalciferol (VITAMIN D3) (2,000 UNITS /50 MCG) cap capsule Take  by mouth two (2) times a day. 2.  
Follow-up Information Follow up With Specialties Details Why Contact Tasha Martines Aimlcar Hogue MD Family Medicine   [address] [place] [address] 
[phone] Doreen Diaz MD Gastroenterology Schedule an appointment as soon as possible for a visit for another ta [address] [place] [address] Schedule an appointment as soon as possible for a visit 3. Return to ED if worse Pt voiced they understand they plan and do not have questions at this time Diagnosis Clinical Impression: 1. Dysphagia, unspecified type 2. Hypertension, unspecified type 3. Acute nonintractable headache, unspecified headache type 4. Anemia, unspecified type 5. Renal insufficiency Attestations: 
 
Alfred Slater MD 
 
Please note that this dictation was completed with [employer], the computer voice recognition software. Quite often unanticipated grammatical, syntax, homophones, and other interpretive errors are inadvertently transcribed by the computer software. Please disregard these errors. Please excuse any errors that have escaped final proofreading. Thank you.

## 2020-10-08 NOTE — ROUTINE PROCESS
Dr. Stephanie Bradshaw and Dr. Masha Westbrook in to see patient and daughter regarding SOB and elevated BP. Dr. Oneill Gobble to patient and daughter that case would be cancelled and it was recommended that patient go to the ER for further evaluation. All questions answered by MD at bedside. Patient transported to the ER with daughter at bedside. Report called to ER nurse.

## 2020-10-08 NOTE — ANESTHESIA PREPROCEDURE EVALUATION
Relevant Problems No relevant active problems Anesthetic History No history of anesthetic complications Review of Systems / Medical History Patient summary reviewed, nursing notes reviewed and pertinent labs reviewed Pulmonary Sleep apnea Neuro/Psych Cardiovascular Hypertension: poorly controlled CHF Hyperlipidemia Exercise tolerance: <4 METS 
  
GI/Hepatic/Renal 
  
 
 
Renal disease: CRI Endo/Other Diabetes: type 2 Other Findings Physical Exam 
 
Airway Mallampati: I 
TM Distance: 4 - 6 cm Neck ROM: normal range of motion Mouth opening: Normal 
 
 Cardiovascular Rhythm: regular Rate: normal 
 
 
 
 Dental 
No notable dental hx Pulmonary Decreased breath sounds Wheezes Comments: Patient Dyspneic and tachepneic. Blood pressure elevated. Abdominal 
GI exam deferred Other Findings Anesthetic Plan ASA: 4 Anesthetic plan and risks discussed with: Patient and Family Procedure canceled due to resp distress and elevated BP. Signs of acute CHF. Possible lung infection. Will transfer care to the ED.

## 2020-10-08 NOTE — DISCHARGE INSTRUCTIONS
Patient Education        Learning About the Diet for Swallowing Problems  What are swallowing problems? Difficulty swallowing is also called dysphagia (say \"ypu-BIY-iqy-uh\"). It is most often a sign of a problem with your throat or esophagus. This is the tube that moves food and liquids from the back of your mouth to your stomach. Trouble swallowing can occur when the muscles and nerves that move food through the throat and esophagus are not working right. To help you swallow food, your doctor or speech therapist may advise a special dysphagia diet for you. Why is a special diet important? A dysphagia diet can help you handle some problems that can occur when it's hard to swallow food and liquids easily. These problems can include:  · Malnutrition. This means you aren't getting enough healthy foods to keep your body working well. · Dehydration. This means you aren't getting enough liquids to keep your body healthy. · Aspiration. This means that food, liquid, or saliva goes down your windpipe (trachea) into your lungs, instead of down your esophagus to your stomach. This can lead to aspiration pneumonia, which is an inflammation of the lungs. What is the dysphagia diet? In the dysphagia diet, you change the foods you eat and the liquids you drink to make it easier to swallow them. You may:  · Change the texture of the foods you eat. Your doctor or speech therapist may advise you to eat one of these types of foods:  ? Easy-to-chew foods. These are foods that are soft or tender. ? Soft and bite-sized foods. These are soft foods that have been cut into small pieces. ? Minced and moist foods. These are very soft, small, and moist lumps of food that need very little chewing. ? Pureed foods. These are foods that have been blended smooth. The puree must be thick enough to hold its shape on a spoon. These foods don't need to be chewed. ? Liquidized foods.  These are foods that have been blended smooth but are not as thick as pureed foods. You can drink them from a cup. · Thicken the liquids you drink. Your doctor or speech therapist will tell you what kind of thickener to use and how thick to make the liquids. ? Slightly thick liquids. These are thicker than water but can flow through a straw. ? Mildly thick liquids. These can be sipped from a cup but take some effort to drink with a straw. ? Moderately thick liquids. These liquids are thick enough to drink from a cup or from a spoon. But they are hard to drink through a wide straw. ? Extremely thick liquids. These are thick enough to hold their shape on a spoon. They are too thick to drink from a cup or suck through a straw. Your speech therapist will help you learn exercises to train your muscles to work together so you can swallow. You may also need to learn how to position your body or how to put food in your mouth to be able to swallow better. Follow-up care is a key part of your treatment and safety. Be sure to make and go to all appointments, and call your doctor if you are having problems. It's also a good idea to know your test results and keep a list of the medicines you take. Where can you learn more? Go to http://www.gray.com/  Enter Z447 in the search box to learn more about \"Learning About the Diet for Swallowing Problems. \"  Current as of: June 26, 2019               Content Version: 12.6  © 3479-4030 [employer]. Care instructions adapted under license by [employer] (which disclaims liability or warranty for this information). If you have questions about a medical condition or this instruction, always ask your healthcare professional. [place] [address] any warranty or liability for your use of this information.

## 2020-10-15 NOTE — ANESTHESIA POSTPROCEDURE EVALUATION
Procedure(s): ESOPHAGOGASTRODUODENAL (EGD) BIOPSY. general, total IV anesthesia Anesthesia Post Evaluation Multimodal analgesia: multimodal analgesia not used between 6 hours prior to anesthesia start to PACU discharge Patient location during evaluation: bedside Patient participation: complete - patient participated Level of consciousness: sleepy but conscious Airway patency: patent Anesthetic complications: no 
Cardiovascular status: acceptable Respiratory status: acceptable Hydration status: acceptable Post anesthesia nausea and vomiting:  none INITIAL Post-op Vital signs:  
Vitals Value Taken Time /77 10/15/2020 10:42 AM  
Temp 36.2 °C (97.2 °F) 10/15/2020 10:01 AM  
Pulse 61 10/15/2020 10:10 AM  
Resp 18 10/15/2020 10:10 AM  
SpO2 98 % 10/15/2020 10:01 AM

## 2020-10-15 NOTE — ANESTHESIA PREPROCEDURE EVALUATION
Relevant Problems No relevant active problems Anesthetic History No history of anesthetic complications Review of Systems / Medical History Patient summary reviewed, nursing notes reviewed and pertinent labs reviewed Pulmonary Sleep apnea Shortness of breath Neuro/Psych Cardiovascular Hypertension CHF Hyperlipidemia Exercise tolerance: <4 METS 
  
GI/Hepatic/Renal 
  
 
 
Renal disease: ESRD Endo/Other Diabetes: type 2, using insulin Anemia Other Findings Comments: Kidney transplant in 2005. Pt had seizure from one of allergy meds. Physical Exam 
 
Airway Mallampati: I 
TM Distance: 4 - 6 cm Neck ROM: normal range of motion Mouth opening: Normal 
 
 Cardiovascular Rhythm: regular Rate: normal 
 
 
 
 Dental 
 
Dentition: Edentulous Pulmonary Breath sounds clear to auscultation Abdominal 
GI exam deferred Other Findings Anesthetic Plan ASA: 4 Anesthesia type: general and total IV anesthesia Induction: Intravenous Anesthetic plan and risks discussed with: Patient

## 2020-10-15 NOTE — H&P
Patient: Aleida Terrazas MRN: [id number]  SSN: [id number]    YOB: 1950  Age: 69 y.o. Sex: female      History and Physical    Aleida Terrazas is a 69 y.o. female having Procedure(s):  ESOPHAGOGASTRODUODENOSCOPY (EGD). Allergies: Allergies   Allergen Reactions    Benadryl [Diphenhydramine Hcl] Other (comments)    Fentanyl Other (comments)    Morphine Unknown (comments)    Versed [Midazolam] Other (comments)        Chief Complaint: difficult swallow     History of Present Illness: difficult swallow for about one month. Past Medical History:   Diagnosis Date    Chronic kidney disease     [facility] [address] [facility]     Heart failure ([facility] [address] [facility]     Hypertension     Kidney disease     Sleep apnea       Past Surgical History:   Procedure Laterality Date    HX CHOLECYSTECTOMY      HX RENAL TRANSPLANT        History reviewed. No pertinent family history. Social History     Tobacco Use    Smoking status: Never Smoker    Smokeless tobacco: Never Used   Substance Use Topics    Alcohol use: Never     Frequency: Never        Prior to Admission medications    Medication Sig Start Date End Date Taking? Authorizing Provider   calcitRIOL (ROCALTROL) 0.25 mcg capsule Take 0.25 mcg by mouth daily. Yes Provider, Historical   hydrALAZINE (APRESOLINE) 50 mg tablet Take 25 mg by mouth three (3) times daily. Yes Provider, Historical   isosorbide mononitrate ER (IMDUR) 60 mg CR tablet Take  by mouth every morning. Yes Provider, Historical   metoprolol succinate (TOPROL-XL) 50 mg XL tablet Take  by mouth daily. Yes Provider, Historical   amLODIPine-atorvastatin (CADUET) 10-10 mg per tablet Take 1 Tab by mouth daily. Yes Provider, Historical   mycophenolic acid (MYFORTIC) 718 mg delayed release tablet Take  by mouth two (2) times a day. Yes Provider, Historical   omeprazole (PRILOSEC) 40 mg capsule Take 40 mg by mouth daily.    Yes Provider, Historical   tacrolimus (PROGRAF) 1 mg capsule Take  by mouth every twelve (12) hours. Yes Provider, Historical   sodium bicarbonate 650 mg tablet Take  by mouth four (4) times daily. Yes Provider, Historical   gemfibroziL (LOPID) 600 mg tablet Take  by mouth two (2) times a day. Yes Provider, Historical   predniSONE (DELTASONE) 2.5 mg tablet Take  by mouth daily (with breakfast). Yes Provider, Historical   aspirin delayed-release 81 mg tablet Take  by mouth daily. Yes Provider, Historical   cholecalciferol (VITAMIN D3) (2,000 UNITS /50 MCG) cap capsule Take  by mouth two (2) times a day. Yes Provider, Historical        Visit Vitals  BP (!) 190/76 (BP 1 Location: Left arm, BP Patient Position: At rest)   Pulse (!) 58   Temp 37.1 °C (98.8 °F)   Resp 18   SpO2 99%        Assessment and Plan:   Kush Wells is a 69 y.o. female having Procedure(s):  ESOPHAGOGASTRODUODENOSCOPY (EGD) for inspection. Preanesthesia Evaluation     Last edited 10/15/20 0745 by Morris Suresh CRNA  Date of Service 10/15/20 0719  Status: Addendum             Relevant Problems   No relevant active problems       Anesthetic History   No history of anesthetic complications            Review of Systems / Medical History  Patient summary reviewed, nursing notes reviewed and pertinent labs reviewed    Pulmonary        Sleep apnea  Shortness of breath         Neuro/Psych              Cardiovascular    Hypertension      CHF    Hyperlipidemia    Exercise tolerance: <4 METS     GI/Hepatic/Renal         Renal disease: ESRD       Endo/Other    Diabetes: type 2, using insulin    Anemia     Other Findings   Comments: Kidney transplant in 2005. Pt had seizure from one of allergy meds.               Anesthesia Plan     Preanesthesia evaluation performed by Morris Suresh CRNA    Revision History       Date/Time User Provider Type Action    > 10/15/20 1061 Morris Suresh CRNA Nurse Anesthetist Addend     10/15/20 Rosanna Ward CRNA Nurse Anesthetist Addend     10/15/20 1281 Morris Suresh CRNA Nurse Anesthetist Sign                  Signed By: Rose Marie Clarke CRNA     October 15, 2020

## 2020-10-22 NOTE — PROGRESS NOTES
[facility], Please forward to your [place] staff to have them inform patient.

## 2020-10-22 NOTE — TELEPHONE ENCOUNTER
----- Message from Lexi Alvarado sent at 10/22/2020  2:08 PM EDT -----  Regarding: Documentation needed for Coding  Dear Dr. Renetta Skiff    Please document or dictate the Procedure Report ASAP; because the hospital is unable to bill for services. Leona Curran            [phone]            10/8/2020    If you have any questions regarding the Procedure report; please contact Paris Avilez, at [phone].     Thank you

## 2020-10-26 PROBLEM — J35.8 TONSILLAR MASS: Status: ACTIVE | Noted: 2020-01-01

## 2020-10-26 NOTE — ED PROVIDER NOTES
HPI  
Patient had an EGD performed at [facility] on October 15. The indication was dysphagia and hematemesis. See separate notes and report. The post procedure diagnosis was reflux and Candida esophagitis. Since procedure, patient and her daughter report that she has had worsening expression of blood-unclear whether this is hemoptysis or hematemesis. Patient has also had return and worsening of dysphagia, sore throat, and new shortness of breath over the last 24 to 48 hours. No report of fever, vomiting, rectal bleeding, constitutional symptoms. Past Medical History:  
Diagnosis Date  Chronic kidney disease  Diabetes ([facility] [address]  Heart failure ([facility] [address]  Hypertension  Kidney disease  Sleep apnea Past Surgical History:  
Procedure Laterality Date  HX CHOLECYSTECTOMY  HX RENAL TRANSPLANT History reviewed. No pertinent family history. Social History Socioeconomic History  Marital status:  Spouse name: Not on file  Number of children: Not on file  Years of education: Not on file  Highest education level: Not on file Occupational History  Not on file Social Needs  Financial resource strain: Not on file  Food insecurity Worry: Not on file Inability: Not on file  Transportation needs Medical: Not on file Non-medical: Not on file Tobacco Use  Smoking status: Never Smoker  Smokeless tobacco: Never Used Substance and Sexual Activity  Alcohol use: Never Frequency: Never  Drug use: Never  Sexual activity: Not on file Lifestyle  Physical activity Days per week: Not on file Minutes per session: Not on file  Stress: Not on file Relationships  Social connections Talks on phone: Not on file Gets together: Not on file Attends Mandaen service: Not on file Active member of club or organization: Not on file   Attends meetings of clubs or organizations: Not on file Relationship status: Not on file  Intimate partner violence Fear of current or ex partner: Not on file Emotionally abused: Not on file Physically abused: Not on file Forced sexual activity: Not on file Other Topics Concern  Not on file Social History Narrative  Not on file ALLERGIES: Benadryl [diphenhydramine hcl]; Fentanyl; Morphine; and Versed [midazolam] Review of Systems Constitutional: Negative. HENT: Positive for sore throat and trouble swallowing. Eyes: Negative. Respiratory: Positive for shortness of breath. Cardiovascular: Negative. Gastrointestinal: Negative. Endocrine: Negative. Genitourinary: Negative. Musculoskeletal: Negative. Allergic/Immunologic: Negative. Neurological: Negative. Hematological: Negative. Psychiatric/Behavioral: Negative. All other systems reviewed and are negative. Vitals:  
 10/26/20 1526 BP: (!) 169/82 Pulse: 61 Resp: 22 Temp: 98.6 °F (37 °C) SpO2: 95% Weight: 66.2 kg (146 lb) Height: 5' 3\" (1.6 m) Physical Exam 
Vitals signs and nursing note reviewed. Constitutional:   
   General: She is not in acute distress. Appearance: Normal appearance. She is obese. She is not ill-appearing, toxic-appearing or diaphoretic. Comments: Uncomfortable appearing but nontoxic HENT:  
   Head: Normocephalic and atraumatic. Nose: Nose normal.  
   Mouth/Throat:  
   Mouth: Mucous membranes are moist.  
   Pharynx: Oropharyngeal exudate and posterior oropharyngeal erythema present. Comments: There is a large plaque adhered to the right tonsil that appears to be exudate versus Candida. There is some surrounding edema and erythema. Eyes:  
   Extraocular Movements: Extraocular movements intact. Pupils: Pupils are equal, round, and reactive to light. Neck: Musculoskeletal: Normal range of motion and neck supple.  No neck rigidity or muscular tenderness. Pulmonary:  
   Effort: Pulmonary effort is normal. No respiratory distress. Breath sounds: No rales. Abdominal:  
   General: Abdomen is flat. There is no distension. Palpations: Abdomen is soft. Tenderness: There is no abdominal tenderness. Musculoskeletal: Normal range of motion. General: No swelling or tenderness. Lymphadenopathy:  
   Cervical: No cervical adenopathy. Skin: 
   General: Skin is warm and dry. Neurological:  
   General: No focal deficit present. Mental Status: She is alert and oriented to person, place, and time. Cranial Nerves: No cranial nerve deficit. Sensory: No sensory deficit. Motor: No weakness. Psychiatric:     
   Mood and Affect: Mood normal.     
   Behavior: Behavior normal.  
 
  
 
MDM Exam is suggestive of Candida both oral and esophageal.  Will check CT soft tissue neck. Unfortunately patient's renal function will not allow contrast but will attempt to visualize airway and surrounding structures. Will check labs and chest x-ray. Admit versus transfer for IV antifungals and possible airway management. Discussed CT findings with radiologist.  Patient will need ENT for urgent biopsy and airway management although she is quite stable for now. Per transfer center, [facility] has coverage and we are awaiting a return call. Spoke with Dr. Prasanth Hernandez, ENT, [facility]. 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 Explained to Dr. Tomas Eugene that the CT report was troubling concerning the airway and he stated that this is a very typical CT finding and that the patient is having stridor or respiratory distress, the airway should not be an issue. I am now attempting to reach the hospitalist at [facility] for transfer and admission for airway protection and biopsy tomorrow.  
 
Discussed case with ER attending and hospitalist [facility]. Patient accepted by both and will be transferred to the ER. Procedures

## 2020-10-26 NOTE — ED NOTES
Transfer Center called for ENT at [facility], [facility]; Notified that ENT unavailable there; SAMANTHA notified and requests ENT at [facility] be paged; awaiting return call.

## 2020-10-26 NOTE — ED NOTES
Report called to Maribell Parker RN at [facility]. Geena Agudelo verbalized understanding of all information given during report. Geena Agudelo denies any further questions or concerns at this time.

## 2020-10-26 NOTE — ED NOTES
Patient's pulse ox dropped to 88% with a good pleth on RA. Placed patient on [facility] and patient pulse ox improved to 97%. Dr. Prosper tuttle.

## 2020-10-26 NOTE — ED NOTES
SAMANTHA put in contact with Dr. Harden Members (ENT) with [facility] via telephone.

## 2020-10-26 NOTE — ED TRIAGE NOTES
Patient presents to ED via wheelchair with daughter. Patient reports she had a EGD at [facility] last week for coughing up blood and she has continued to throw up blood since. Patient reports she is coughing up more blood now than when she had her bronchoscopy last week. Patient also reports shortness of breath. AXO x4, skin is warm and dry, resp even and labored, patient is grimacing and appears to be uncomfortable.

## 2020-10-27 NOTE — ED TRIAGE NOTES
Pt transferred for further evaluation for throat mass. Pt reports large lump in throat for several months.

## 2020-10-27 NOTE — PROGRESS NOTES
TRANSFER - OUT REPORT: 
 
Verbal report given to Dony Taylor RN (name) on Kayla Mina  being transferred to [facility] (unit) for routine progression of care Report consisted of patients Situation, Background, Assessment and  
Recommendations(SBAR). Information from the following report(s) SBAR was reviewed with the receiving nurse. Lines:  
Peripheral IV 10/27/20 Right Forearm (Active) Site Assessment Clean, dry, & intact 10/27/20 1530 Phlebitis Assessment 0 10/27/20 1530 Infiltration Assessment 0 10/27/20 1530 Dressing Status Clean, dry, & intact 10/27/20 1530 Dressing Type Transparent 10/27/20 1530 Hub Color/Line Status Pink;Capped 10/27/20 1530 Opportunity for questions and clarification was provided. Patient transported with: 
 Tech, O2 1 liter

## 2020-10-27 NOTE — PROGRESS NOTES
[address] [facility]  Hospitalist Group Hospitalist Progress Note Jesus Calix MD 
Answering service: [phone] OR [phone] from in house phone Date of Service:  10/27/2020 NAME:  Nicole Mann :  1950 MRN:  [id number] Admission Summary:  
Nicole Mann is a 69 y.o. female who has a history of renal transplant presents with feeling a lump in her throat and coughing up phlegm for 2 weeks. She reports she went to see Gi for this and had EGD which showed Candida esophagitis She was on Dilfucan, She hd persistence of symptoms and presents to ED at [facility]. Interval history / Subjective:  
  Per daughter who is at bedside, EGD was negative for any mass on the tonsil at the time of prior EGD. Denies fevers. Continues to report trouble breathing, and difficulty swallowing. Assessment & Plan:  
 
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  
- ENT to see and perform biopsy today, then likely will DC  
- NPO for now - Keep in ED for now, as likely will DC later today after biopsy Recently diagnosed candidal esophagitis - Continue oral fluconazole, may consider consult if repeat EGD needed H/o CKD stage 3 with h/o renal transplant - Agree with resumption of home medications, as long as safe to swallow - Monitor levels per nephrology - I and O, avoid nephrotoxins HTN urgency - in setting of SOB, and not being able to take home medications - IV PRN hydralazine 
- Once able can resume home medications. Code status: FULL 
DVT prophylaxis: SCDs for now Care Plan discussed with: Patient/Family Anticipated Disposition: Home w/Family Anticipated Discharge: Less than 24 hours Hospital Problems  Date Reviewed: 10/15/2020 Codes Class Noted POA Tonsillar mass ICD-10-CM: J35.8 ICD-9-CM: 474.8  10/26/2020 Unknown Review of Systems: A comprehensive review of systems was negative except for that written in the HPI. Vital Signs:  
 Last 24hrs VS reviewed since prior progress note. Most recent are: 
Visit Vitals BP (!) 165/94 Pulse 74 Temp 98.5 °F (36.9 °C) Resp 18 SpO2 99% No intake or output data in the 24 hours ending 10/27/20 1111 Physical Examination:  
 
 
     
Constitutional:  No acute distress, cooperative, pleasant ENT:  Oral mucosa moist, oropharynx with obvious tonsillar swelling, some white plaques surrounding Resp:  CTA bilaterally. No wheezing/rhonchi/rales. No accessory muscle use CV:  Regular rhythm, normal rate, no murmurs, gallops, rubs GI:  Soft, non distended, non tender. normoactive bowel sounds, no hepatosplenomegaly Musculoskeletal:  No edema, warm, 2+ pulses throughout Neurologic:  Moves all extremities. AAOx3, CN II-XII reviewed Skin:  Good turgor, no rashes or ulcers Data Review:  
 Review and/or order of clinical lab test 
Review and/or order of tests in the radiology section of CPT Review and/or order of tests in the medicine section of CPT Labs:  
 
Recent Labs 10/26/20 
1545 WBC 5.1 HGB 9.8* HCT 30.9*  Recent Labs 10/26/20 
1545   
K 4.5  
 CO2 29 BUN 48* CREA 2.38* * CA 9.4 Recent Labs 10/26/20 
1545 ALT 7*  TBILI 0.3 TP 7.5 ALB 3.6 GLOB 3.9 No results for input(s): INR, PTP, APTT, INREXT in the last 72 hours. No results for input(s): FE, TIBC, PSAT, FERR in the last 72 hours. No results found for: FOL, RBCF No results for input(s): PH, PCO2, PO2 in the last 72 hours. No results for input(s): CPK, CKNDX, TROIQ in the last 72 hours. No lab exists for component: CPKMB No results found for: CHOL, CHOLX, CHLST, CHOLV, HDL, HDLP, LDL, LDLC, DLDLP, TGLX, TRIGL, TRIGP, CHHD, CHHDX Lab Results Component Value Date/Time  Glucose (POC) 325 (H) 10/27/2020 07:44 AM  
 No results found for: COLOR, APPRN, SPGRU, REFSG, ANDREA, PROTU, GLUCU, KETU, BILU, UROU, ROXANN, LEUKU, GLUKE, EPSU, BACTU, WBCU, RBCU, CASTS, UCRY Medications Reviewed:  
 
Current Facility-Administered Medications Medication Dose Route Frequency  glucose chewable tablet 16 g  4 Tab Oral PRN  
 glucagon (GLUCAGEN) injection 1 mg  1 mg IntraMUSCular PRN  
 dextrose 10% infusion 0-250 mL  0-250 mL IntraVENous PRN  
 insulin lispro (HUMALOG) injection   SubCUTAneous AC&HS  mycophenolate sodium (MYFORTIC) delayed release tablet 360 mg  360 mg Oral BID  dexamethasone (DECADRON) 4 mg/mL injection 4 mg  4 mg IntraVENous Q6H  
 hydrALAZINE (APRESOLINE) 20 mg/mL injection 10 mg  10 mg IntraVENous Q6H PRN  
 sodium chloride (NS) flush 5-40 mL  5-40 mL IntraVENous Q8H  
 sodium chloride (NS) flush 5-40 mL  5-40 mL IntraVENous PRN  
 acetaminophen (TYLENOL) tablet 650 mg  650 mg Oral Q6H PRN Or  
 acetaminophen (TYLENOL) suppository 650 mg  650 mg Rectal Q6H PRN  polyethylene glycol (MIRALAX) packet 17 g  17 g Oral DAILY PRN  
 ondansetron (ZOFRAN ODT) tablet 4 mg  4 mg Oral Q8H PRN Or  
 ondansetron (ZOFRAN) injection 4 mg  4 mg IntraVENous Q6H PRN  
 isosorbide mononitrate ER (IMDUR) tablet 60 mg  60 mg Oral 7am  
 tacrolimus (PROGRAF) capsule 1 mg  1 mg Oral Q12H  calcitRIOL (ROCALTROL) capsule 0.25 mcg  0.25 mcg Oral DAILY  hydrALAZINE (APRESOLINE) tablet 25 mg  25 mg Oral TID  gemfibroziL (LOPID) tablet 600 mg  600 mg Oral ACB&D  
 nitroglycerin (NITROBID) 2 % ointment 1 Inch  1 Inch Topical TID  pantoprazole (PROTONIX) 40 mg in 0.9% sodium chloride 10 mL injection  40 mg IntraVENous Q12H  
 metoprolol succinate (TOPROL-XL) XL tablet 50 mg  50 mg Oral DAILY  fluconazole in 0.9% NaCl 100 mg IVPB  100 mg IntraVENous Q24H Current Outpatient Medications Medication Sig  
 calcitRIOL (ROCALTROL) 0.25 mcg capsule Take 0.25 mcg by mouth daily.   
 hydrALAZINE (APRESOLINE) 50 mg tablet Take 25 mg by mouth three (3) times daily.  isosorbide mononitrate ER (IMDUR) 60 mg CR tablet Take  by mouth every morning.  metoprolol succinate (TOPROL-XL) 50 mg XL tablet Take  by mouth daily.  amLODIPine-atorvastatin (CADUET) 10-10 mg per tablet Take 1 Tab by mouth daily.  mycophenolic acid (MYFORTIC) 553 mg delayed release tablet Take  by mouth two (2) times a day.  omeprazole (PRILOSEC) 40 mg capsule Take 40 mg by mouth daily.  tacrolimus (PROGRAF) 1 mg capsule Take  by mouth every twelve (12) hours.  sodium bicarbonate 650 mg tablet Take  by mouth four (4) times daily.  gemfibroziL (LOPID) 600 mg tablet Take  by mouth two (2) times a day.  predniSONE (DELTASONE) 2.5 mg tablet Take  by mouth daily (with breakfast).  aspirin delayed-release 81 mg tablet Take  by mouth daily.  cholecalciferol (VITAMIN D3) (2,000 UNITS /50 MCG) cap capsule Take  by mouth two (2) times a day.  
 
______________________________________________________________________ EXPECTED LENGTH OF STAY: 2d 21h ACTUAL LENGTH OF STAY:          1 
 
            
Damir Stratton MD

## 2020-10-27 NOTE — PROGRESS NOTES
Day #1 of Fluconazole Indication:  candida esophagitis Current regimen:  200 mg q24h Recent Labs 10/26/20 
1545 WBC 5.1 CREA 2.38* BUN 48* Est CrCl: 20 ml/min Temp (24hrs), Av.4 °F (36.9 °C), Min:98.1 °F (36.7 °C), Max:98.6 °F (37 °C) Plan: Change to 100 mg q24h ( 200 mg first dose was given)

## 2020-10-27 NOTE — PROGRESS NOTES
Admission Medication Reconciliation: 
 
 
Cristiano Traylor (ED pharmacist) and I have attempted to get additional information, however, patient is unable to articulate her regimen well at this time, daughter states home nurse assists with medications but is unable to provide contact information. Therefore, med list was updated to reflect RX Query data.  
 
+++++++++++++++++++++++++++++++++++++++++++++++++++++++++++++++ In progress: 
 
Unable to speak with patient face to face at this time due to general isolation precautions in the ED related to COVID-19 pandemic. Spoke with patient on ED phone briefly--difficult to understand her and she made a vague reference to a nurse who help her with her medications. I will follow up and update if additional information becomes available. RX Query is available and some instructions differ from what is shown on PTA med list, which is concerning given that some medications are to prevent rejection (renal transplant). Thank you for allowing me to participate in the care of your patient. Fletcher Snowden PharmD, RN # [phone] ¹RxQuery pharmacy benefit data reflects medications filled and processed through the patient's insurance, however  
this data does NOT capture whether the medication was picked up or is currently being taken by the patient. Allergies:  Benadryl [diphenhydramine hcl]; Fentanyl; Morphine; and Versed [midazolam] Significant PMH/Disease States:  
Past Medical History:  
Diagnosis Date  Chronic kidney disease  Diabetes ([facility] [address]  Heart failure ([facility] [address]  Hypertension  Kidney disease  Sleep apnea Chief Complaint for this Admission: Chief Complaint Patient presents with  Transfer Of Care Prior to Admission Medications:  
Prior to Admission Medications Prescriptions Last Dose Informant Taking? amLODIPine (NORVASC) 10 mg tablet   Yes Sig: Take 10 mg by mouth daily.   
aspirin delayed-release 81 mg tablet   No  
Sig: Take  by mouth daily. calcitRIOL (ROCALTROL) 0.25 mcg capsule   No  
Sig: Take 0.25 mcg by mouth daily. cholecalciferol (VITAMIN D3) (2,000 UNITS /50 MCG) cap capsule   No  
Sig: Take  by mouth two (2) times a day. gemfibroziL (LOPID) 600 mg tablet   No  
Sig: Take 600 mg by mouth daily. hydrALAZINE (APRESOLINE) 50 mg tablet   No  
Sig: Take 25 mg by mouth three (3) times daily. isosorbide mononitrate ER (IMDUR) 60 mg CR tablet   No  
Sig: Take  by mouth every morning. metoprolol succinate (TOPROL-XL) 50 mg XL tablet   No  
Sig: Take  by mouth daily. mycophenolic acid (MYFORTIC) 588 mg delayed release tablet   No  
Sig: Take  by mouth two (2) times a day. omeprazole (PRILOSEC) 40 mg capsule   No  
Sig: Take 40 mg by mouth daily. predniSONE (DELTASONE) 2.5 mg tablet   No  
Sig: Take  by mouth daily (with breakfast). sodium bicarbonate 650 mg tablet   No  
Sig: Take  by mouth four (4) times daily. tacrolimus (PROGRAF) 1 mg capsule   No  
Sig: Take  by mouth every twelve (12) hours. Facility-Administered Medications: None Please contact the main inpatient pharmacy with any questions or concerns at [phone] and we will direct you to the clinical pharmacist covering this patient's care while in-house.   
SOFYA Bustos

## 2020-10-27 NOTE — ED NOTES
Bedside shift change report received from to [address]. Report included the following information SBAR, ED Summary, Intake/Output, MAR and Recent Results.

## 2020-10-27 NOTE — ED PROVIDER NOTES
Is a 41-year-old who arrives as a transfer for difficulty breathing secondary to a large right pharyngeal soft tissue mass nearly completely obstructing the nasopharyngeal airway posteriorly. It measures at least 2.2 x 3.7 x 4.7 cm. She received dexamethasone prior to transfer but continues to report difficulty breathing and swallowing. The history is provided by medical records and the patient. Past Medical History:  
Diagnosis Date  Chronic kidney disease  Diabetes ([facility]  Heart failure ([facility]  Hypertension  Kidney disease  Sleep apnea Past Surgical History:  
Procedure Laterality Date  HX CHOLECYSTECTOMY  HX RENAL TRANSPLANT History reviewed. No pertinent family history. Social History Socioeconomic History  Marital status:  Spouse name: Not on file  Number of children: Not on file  Years of education: Not on file  Highest education level: Not on file Occupational History  Not on file Social Needs  Financial resource strain: Not on file  Food insecurity Worry: Not on file Inability: Not on file  Transportation needs Medical: Not on file Non-medical: Not on file Tobacco Use  Smoking status: Never Smoker  Smokeless tobacco: Never Used Substance and Sexual Activity  Alcohol use: Never Frequency: Never  Drug use: Never  Sexual activity: Not on file Lifestyle  Physical activity Days per week: Not on file Minutes per session: Not on file  Stress: Not on file Relationships  Social connections Talks on phone: Not on file Gets together: Not on file Attends Bahai service: Not on file Active member of club or organization: Not on file Attends meetings of clubs or organizations: Not on file Relationship status: Not on file  Intimate partner violence Fear of current or ex partner: Not on file   Emotionally abused: Not on file Physically abused: Not on file Forced sexual activity: Not on file Other Topics Concern  Not on file Social History Narrative  Not on file ALLERGIES: Benadryl [diphenhydramine hcl]; Fentanyl; Morphine; and Versed [midazolam] Review of Systems Constitutional: Negative for chills and fever. HENT: Positive for sore throat, trouble swallowing and voice change. Respiratory: Positive for shortness of breath. Cardiovascular: Negative for chest pain. Gastrointestinal: Negative for abdominal pain, constipation, diarrhea and vomiting. Neurological: Negative for dizziness and light-headedness. All other systems reviewed and are negative. Vitals:  
 10/26/20 2113 Pulse: 65 Resp: 20 Temp: 98.1 °F (36.7 °C) SpO2: 100% Physical Exam 
Vitals signs and nursing note reviewed. Constitutional:   
   General: She is not in acute distress. Appearance: She is well-developed. She is ill-appearing. Comments: Sitting upright HENT:  
   Head: Normocephalic and atraumatic. Mouth/Throat:  
   Mouth: Mucous membranes are moist.  
   Comments: Large mass posterior to the uvula extending down into the oropharynx with a white coating Eyes:  
   General: No scleral icterus. Neck: Musculoskeletal: Normal range of motion. Cardiovascular:  
   Rate and Rhythm: Normal rate. Pulmonary:  
   Effort: Pulmonary effort is normal.  
   Comments: Grunting Abdominal:  
   General: There is no distension. Skin: 
   Findings: No erythema or rash. Neurological:  
   Mental Status: She is alert and oriented to person, place, and time. MDM Procedures Patient is being admitted to the hospital.  The results of their tests and reasons for their admission have been discussed with them and/or available family. They convey agreement and understanding for the need to be admitted and for their admission diagnosis.   Consultation will be made now with the inpatient physician for hospitalization. Perfect Serve Consult for Admission 9:29 PM 
 
ED Room Number: LA10/94 Patient Name and age:  Sarah Amis 69 y.o.  female Working Diagnosis:  
1. Tonsillar mass COVID-19 Suspicion:  no 
Sepsis present:  no  Reassessment needed: no 
Code Status:  Full Code Readmission: no 
Isolation Requirements:  no 
Recommended Level of Care:  step down Department:[facility] Adult ED - [phone] Other:  transfer

## 2020-10-27 NOTE — PROGRESS NOTES
Consult received for KID Tx pt Cr 2.3 now Will order labs and full eval in am 
 tacro dose reduced by half IVF started

## 2020-10-27 NOTE — CONSULTS
[place] Name:  Tamika López 
MR#:  [id number] :  1950 ACCOUNT #:  [de-identified] DATE OF SERVICE:  10/27/2020 REASON FOR CONSULTATION:  Tonsillar mass. HISTORY OF PRESENT ILLNESS:  The patient is a 69-year-old female, who has had several months of aggressive dysphagia and sensation of a lump in the throat. This has been getting progressively worse. She was seen, while she was in [facility], by a local GI physician, who performed upper endoscopy and diagnosed her with esophagitis with candida and started treatment on this. Her symptoms have worsened, so she presented to the emergency department. Examination demonstrated a large tonsil mass. She has a fair history of renal failure and underwent renal transplant in the past.  She has a history of hypertension and coronary artery disease with a stent in place. She takes aspirin daily. She denies use of tobacco, but has been on anti-rejection medications for her renal transplant. MEDICATIONS:  Include; 1.  Glucagon as needed for insulin and for her diabetes. 2.  Myfortic. 3.  Dexamethasone. 4.  Apresoline. 5.  Tylenol as needed. 6.  Zofran as needed. 7.  Protonix. 8.  Prograf. 9.  Lopid. 10.  Dapsone. 11. Aspirin. 12.  Vitamin D. ALLERGIES:  SHE HAS NO KNOWN MEDICATION ALLERGIES. PHYSICAL EXAMINATION: Today demonstrates, GENERAL:  Elderly female, in no acute distress. The voice is normal.  There is no stridor or dyspnea. HEENT:  The oral cavity demonstrates a large mass in the right tonsil fossa coming across the midline, extending obviously into the nasopharynx behind the soft palate. Tongue moves well. Posterior oropharynx is normal in appearance. Left tonsil appears normal.  The oropharynx is patent. The floor of the mouth is soft. Nasal cavity demonstrates congested nose, which was decongested bilaterally, and a flexible fiberoptic nasopharyngoscope was passed.   There were no nasal lesions and nasopharynx was obstructed on the right side due to this large mass extending into the nasopharynx. The larynx and hypopharynx are well visualized. The base of the tongue appears normal.  The supraglottic larynx and pharynx appear otherwise normal.  There is no obstruction of the hypopharynx. Vocal cords moved normally and the airway is patent. Base of the tongue is soft on palpation. NECK:  Palpation of the neck demonstrates bilateral cervical adenopathy. DIAGNOSTIC DATA:  I reviewed the patient's CT scan. There is a large mass facing the nasopharyngeal airway. The oropharyngeal airway is patent. The mass is under the right tonsil. The base of the tongue appears normal as does the larynx. 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 PLAN:  This were discussed with the patient and her daughter. A biopsy was performed with the patient's consent. Complications including bleeding and infection were discussed. The biopsy was taken with the forceps. There was no significant bleeding other than some oozing of the viable tissue. This was sent to pathology. At this point, she is able to swallow, so I do not think a PEG tube is necessary. She will continue with her soft diet and liquids. Once the diagnosis is confirmed, this will likely require primary radiation therapy and possibly adjuvant chemotherapy, depending on how much she can tolerate. She lives in [place] . This is quite a distance away and the treatment will be arranged at local facilities. Once the biopsy results are available, we will coordinate this and she will have a follow up after treatment in our [facility] office. There is no other HEENT process that needs to be addressed at this point and from our standpoint, she can be discharged home any time. Ashley Noble MD 
 
 
[id number] 
D:  10/27/2020 13:53 
T:  10/27/2020 18:46 JOB #:  [id number] CC:  Amisha Vazquez MD

## 2020-10-27 NOTE — ED NOTES
Bedside shift change report given to Colt June RN (oncoming nurse) by [facility], [facility], RN (offgoing nurse). Report included the following information SBAR, Kardex, ED Summary, Intake/Output, MAR and Recent Results.

## 2020-10-27 NOTE — ED NOTES
Verbal shift change report given to Jem Dennis RN (oncoming nurse) by Jian Gray RN (offgoing nurse). Report included the following information SBAR, Kardex, ED Summary, [facility] and Recent Results.

## 2020-10-27 NOTE — H&P
History and Physical 
                                               
 
Primary Care Provider: Isaiah Beal MD 
 
NAME: Frank Brewer :  1950 MRN:  [id number] Date/Time:  10/26/2020 9:41 PM 
 
Patient PCP: Isaiah Beal MD 
 
Patient allows participation of the people present in the room to discuss their medical care. History of Presenting Illness:  
Frank Brewer is a 69 y.o. female who has a history of renal transplant presents with feeling a lump in her throat and coughing up phlegm for 2 weeks. She reports she went to see Gi for this and had EGD which showed Candida esophagitis She was on Dilfucan, She hd persistence of symptoms and presents to ED at [facility]. She had a CT neck which showed a tonsillar mass concerning for cancer. ENT was consulted who didn't think patient was at risk of airway compromise and requested to be transferred to [facility] for evalaution. She was referred for admission, She was seen in ed and noted to be having a hot voice. She is able to swallow per her report. She is 100% on the pulse oximeter. Her blood pressure was noted to be elevated at 190's. The patient denies any fever, chills, chest pain, cough, congestion, recent illness, palpitations, or dysuria. Review of Systems: A comprehensive review of systems was negative except for that written in the History of Present Illness. Past Medical History:  
Diagnosis Date  Chronic kidney disease  Diabetes ([facility]  Heart failure ([facility]  Hypertension  Kidney disease  Sleep apnea Past Surgical History:  
Procedure Laterality Date  HX CHOLECYSTECTOMY  HX RENAL TRANSPLANT Prior to Admission medications Medication Sig Start Date End Date Taking? Authorizing Provider  
calcitRIOL (ROCALTROL) 0.25 mcg capsule Take 0.25 mcg by mouth daily. Provider, Historical  
hydrALAZINE (APRESOLINE) 50 mg tablet Take 25 mg by mouth three (3) times daily. Provider, Historical  
isosorbide mononitrate ER (IMDUR) 60 mg CR tablet Take  by mouth every morning. Provider, Historical  
metoprolol succinate (TOPROL-XL) 50 mg XL tablet Take  by mouth daily. Provider, Historical  
amLODIPine-atorvastatin (CADUET) 10-10 mg per tablet Take 1 Tab by mouth daily. Provider, Historical  
mycophenolic acid (MYFORTIC) 900 mg delayed release tablet Take  by mouth two (2) times a day. Provider, Historical  
omeprazole (PRILOSEC) 40 mg capsule Take 40 mg by mouth daily. Provider, Historical  
tacrolimus (PROGRAF) 1 mg capsule Take  by mouth every twelve (12) hours. Provider, Historical  
sodium bicarbonate 650 mg tablet Take  by mouth four (4) times daily. Provider, Historical  
gemfibroziL (LOPID) 600 mg tablet Take  by mouth two (2) times a day. Provider, Historical  
predniSONE (DELTASONE) 2.5 mg tablet Take  by mouth daily (with breakfast). Provider, Historical  
aspirin delayed-release 81 mg tablet Take  by mouth daily. Provider, Historical  
cholecalciferol (VITAMIN D3) (2,000 UNITS /50 MCG) cap capsule Take  by mouth two (2) times a day. Provider, Historical  
 
Allergies Allergen Reactions  Benadryl [Diphenhydramine Hcl] Other (comments)  Fentanyl Other (comments)  Morphine Unknown (comments)  Versed [Midazolam] Other (comments) History reviewed. No pertinent family history. SOCIAL HISTORY: 
Patient resides: 
Independently [facility] With family care Smoking history:  
None X Former Chronic Alcohol history:  
None X Social   
Chronic Ambulates:  
Independently X  
w/cane   
w/walker   
w/wc CODE STATUS: 
DNR Full X Other Objective:  
 
Physical Exam:  
 
Visit Vitals BP (!) 202/83 Pulse 65 Temp 98.1 °F (36.7 °C) Resp 20 SpO2 100% O2 Device: Room air General:  Alert, cooperative, no distress, appears stated age.   
Head:  Normocephalic, without obvious abnormality, atraumatic. Eyes:  Conjunctivae/corneas clear. PERRL, EOMs intact. Nose: Nares normal. Septum midline. Mucosa normal. No drainage or sinus tenderness. Throat: . She has dentures in place. Mass underneath the tonsillar mucosa noted on the left tonsil area. This is effacing the lateral aspect of the oropharyngeal cavity. Uvula is in midline. Tongue is also midline. No tongue swelling appreciated. I do not appreciate thrush. Neck: Supple, symmetrical, trachea midline, no adenopathy, thyroid: no enlargement/tenderness/nodules, no carotid bruit and no JVD. Back:   Symmetric, no curvature. ROM normal. No CVA tenderness. Lungs:   Clear to auscultation bilaterally. Chest wall:  No tenderness or deformity. Heart:  Regular rate and rhythm, S1, S2 normal, no murmur, click, rub or gallop. Abdomen:   Soft, non-tender. Bowel sounds normal. No masses,  No organomegaly. Extremities: Extremities normal, atraumatic, no cyanosis or edema. Pulses: 2+ and symmetric all extremities. Skin: Skin color, texture, turgor normal. No rashes or lesions Neurologic: CNII-XII intact. Cap refill: Brisk, less than 3 seconds Pulses: 2+, symmetric in all extremities Data Review:  
 
Recent Days: 
Recent Labs 10/26/20 
1545 WBC 5.1 HGB 9.8* HCT 30.9*  Recent Labs 10/26/20 
1545   
K 4.5  
 CO2 29 * BUN 48* CREA 2.38* CA 9.4 ALB 3.6 TBILI 0.3 ALT 7* No results for input(s): PH, PCO2, PO2, HCO3, FIO2 in the last 72 hours. 24 Hour Results: 
Recent Results (from the past 24 hour(s)) CBC WITH AUTOMATED DIFF Collection Time: 10/26/20  3:45 PM  
Result Value Ref Range WBC 5.1 4.4 - 11.3 K/uL  
 RBC 3.36 (L) 4.50 - 5.90 M/uL HGB 9.8 (L) 13.5 - 17.5 g/dL HCT 30.9 (L) 36 - 46 % MCV 91.9 80 - 100 FL  
 MCH 29.1 (L) 31 - 34 PG  
 MCHC 31.7 31.0 - 36.0 g/dL  
 RDW 18.0 (H) 11.5 - 14.5 % PLATELET 094 K/uL  MPV 8.2 6.5 - 11.5 FL  
 NRBC 10.0  WBC ABSOLUTE NRBC 0.00 K/uL NEUTROPHILS 78 (H) 42 - 75 % LYMPHOCYTES 9 (L) 20.5 - 51.1 % MONOCYTES 11 (H) 1.7 - 9.3 % EOSINOPHILS 1 0.9 - 2.9 % BASOPHILS 1 0.0 - 2.5 % ABS. NEUTROPHILS 4.0 1.8 - 7.7 K/UL  
 ABS. LYMPHOCYTES 0.5 (L) 1.0 - 4.8 K/UL  
 ABS. MONOCYTES 0.5 0.2 - 2.4 K/UL  
 ABS. EOSINOPHILS 0.0 0.0 - 0.7 K/UL  
 ABS. BASOPHILS 0.0 0.0 - 0.2 K/UL METABOLIC PANEL, COMPREHENSIVE Collection Time: 10/26/20  3:45 PM  
Result Value Ref Range Sodium 137 136 - 145 mmol/L Potassium 4.5 3.5 - 5.1 mmol/L Chloride 100 97 - 108 mmol/L  
 CO2 29 21 - 32 mmol/L Anion gap 8 5 - 15 mmol/L Glucose 364 (H) 65 - 100 mg/dL BUN 48 (H) 6 - 20 mg/dL Creatinine 2.38 (H) 0.55 - 1.02 mg/dL BUN/Creatinine ratio 20 12 - 20 GFR est AA 24 (L) >60 ml/min/1.73m2 GFR est non-AA 20 (L) >60 ml/min/1.73m2 Calcium 9.4 8.5 - 10.1 mg/dL Bilirubin, total 0.3 0.2 - 1.0 mg/dL AST (SGOT) 11 (L) 15 - 37 U/L  
 ALT (SGPT) 7 (L) 12 - 78 U/L Alk. phosphatase 104 45 - 117 U/L Protein, total 7.5 6.4 - 8.2 g/dL Albumin 3.6 3.5 - 5.0 g/dL Globulin 3.9 2.0 - 4.0 g/dL A-G Ratio 0.9 (L) 1.1 - 2.2 Imaging: Xr Chest Pa Lat Result Date: 10/26/2020 IMPRESSION: Cardiomegaly with vascular engorgement and Kerley B line consistent with chronic congestive heart failure. No effusion. Prior cholecystectomy and right arm surgery. Ct Neck Soft Tissue Wo Cont Result Date: 10/26/2020 IMPRESSION: Large right tonsillar soft tissue mass causing near complete effacement of the nasopharyngeal airway on the coronal and sagittal reformatted images. The oropharyngeal airway is patent. Significant cervical adenopathy. Edema of the soft tissues of the head and neck. Recommend ENT evaluation with a rightward biopsy. While this could represent a huge candida plaque, neoplasm is in the differential diagnosis. Please see full report. Assessment:  
 
 
Given the patient's current clinical presentation, I have a high level of concern for decompensation if discharged from the emergency department. Complex decision making was performed, which includes reviewing the patient's available past medical records, laboratory results, and x-ray films. My assessment of this patient's clinical condition and my plan of care is as follows. Active Problems: 
  Tonsillar mass (10/26/2020) Plan:  
 
Large left tonsillar mass Possibility of carcinoma per ENT report. Admit to [facility] for close airway monitoring Continuous pulse oximetry If any deterioration would need intubation We will order Decadron to help with oral swelling Order Pepcid Closely monitor Tonsillar mass Could need a biopsy for diagnosis ENT has been consulted and is aware of the transfer Hypertensive urgency Order IV hydralazine now. Continue home medications CKD stage III with history of renal transplant Resume home medications including CellCept and mycophenolate, pharmacy consult Request nephrology evaluation Code Status: Full code Surrogate Decision Maker: Daughter Called and updated Tyson Blizzard  [phone] DVT Prophylaxis: SCD 
GI Prophylaxis: Pepcid FUNCTIONAL STATUS PRIOR TO ADMISSION: Ambulates Independently Patient is from: Home Care Plan discussed with: Patient/Family Anticipated Disposition: Home w/Family Anticipated Discharge: Greater than 48 hours Patient was explained about the risk associated with hospitalization including (and not a complete) risk of falls,fractures,blood clots,Bleeding from medications (including anticoagulants used for DVT ppx), Sepsis,allergic reactions,infections,radiation risk from the imaging studies performed,transfusions of blood products,Renal failure  and also risk of death.  Patient also understands and agrees to the treatment plan including medications and also understand the risk with radiation while undergoing imaging studies. The patient  And  family  (after permission given by the patient) understands the need to be hospitalized and follow medical orders, agree with the admission plan. Thank You Dr Negrito Burkett MD for taking care of your patient. Please call if you have any questions. Signed By: Beverly Cox MD   
 October 26, 2020 Please note that this dictation was completed with SemiLev, the computer voice recognition software. Quite often unanticipated grammatical, syntax, homophones, and other interpretive errors are inadvertently transcribed by the computer software. Please disregard these errors. Please excuse any errors that have escaped final proofreading. Thank you.

## 2020-10-28 NOTE — PROGRESS NOTES
Transition Plan of Care 
RUR 18% Discharging home today. Will follow up with ENT for biopsy of neck mass. No skilled needs. Valencia Denney RN CRM Ext [phone]

## 2020-10-28 NOTE — PROGRESS NOTES
Reason for Admission: admitted from ED in [place] [address] with swelling in neck. Has a history of CKD, DM, CHF and HTN. Also history of kidney transplant. RUR Score:   18%-Med PCP: First and Last name: Kirsty Burger Name of Practice:  
 Are you a current patient: Yes/No: YES Approximate date of last visit: 10/2020 Can you participate in a virtual visit if needed: YES Do you (patient/family) have any concerns for transition/discharge? No needs identified at this time. Once workup complete will better be able to plan disposition. Plan for utilizing home health: Independent prior to admit. Pending progress, needs may change and CM will address. Current Advanced Directive/Advance Care Plan:  Does not have AMD daughter [place] is [employer] Transition of Care Plan: 
Consult Otolaryngology-neck mass Medicare pt has received, reviewed, and signed 1st IM letter informing them of their right to appeal the discharge. Signed copied has been placed on pt bedside chart. Harsha Brooks RN CRM Ext [phone]

## 2020-10-28 NOTE — PROGRESS NOTES
1730: Discharge orders received. Discharge paperwork reviewed with pt and pt's daughter and [address], [place]. Time given for questions and all questions answered to the best of my ability. All peripheral lines removed by RN. Pt taken down to discharge lot in wheelchair with belongings by this RN. Pt escorted into car. Problem: Falls - Risk of 
Goal: *Absence of Falls Description: Document Suad Yadav Fall Risk and appropriate interventions in the flowsheet. Outcome: Resolved/Met Note: Fall Risk Interventions: 
Mobility Interventions: Communicate number of staff needed for ambulation/transfer, Patient to call before getting OOB, PT Consult for assist device competence, Utilize walker, cane, or other assistive device Medication Interventions: Evaluate medications/consider consulting pharmacy, Patient to call before getting OOB, Teach patient to arise slowly Elimination Interventions: Call light in reach, Patient to call for help with toileting needs, Stay With Me (per policy), Toileting schedule/hourly rounds Problem: Patient Education: Go to Patient Education Activity Goal: Patient/Family Education Outcome: Resolved/Met Problem: Hypertension Goal: *Blood pressure within specified parameters Outcome: Resolved/Met Goal: *Fluid volume balance Outcome: Resolved/Met Goal: *Labs within defined limits Outcome: Resolved/Met Problem: Patient Education: Go to Patient Education Activity Goal: Patient/Family Education Outcome: Resolved/Met Problem: Breathing Pattern - Ineffective Goal: *Absence of hypoxia Outcome: Resolved/Met Goal: *Use of effective breathing techniques Outcome: Resolved/Met Goal: *PALLIATIVE CARE:  Alleviation of Dyspnea Outcome: Resolved/Met Problem: Patient Education: Go to Patient Education Activity Goal: Patient/Family Education Outcome: Resolved/Met

## 2020-10-28 NOTE — ROUTINE PROCESS
Specialty appointment has been scheduled with Dr. Jessica Paredes for Nov 3 @ 10:10AM. Pending patient discharge.   Shree Jimenes, Care Management Specialist.

## 2020-10-28 NOTE — CONSULTS
NEPHROLOGY CONSULT NOTE Patient: Dana Sy MRN: [id number]  PCP: Lianet Mosley MD  
:     1950  Age:   69 y.o. Sex:  female Referring physician: Rosalina Collins I* Reason for consultation: 69 y.o. female with Tonsillar mass [P41.2] complicated by CATALINA Admission Date: 10/26/2020  9:04 PM  LOS: 2 days ASSESSMENT and PLAN :  
Renal Transplant: 
- cont current IS meds - prograf level daily while here - dose reduced due to fluconazole 
- daily labs CKD III: 
- baseline Cr appears to be around 2 
- d/c IVF and monitor Tonsillar mass s/p biopsy 10/27: - per ENT 
 
HTN: 
- cont current meds Candida esophagitis: 
- on IV fluconazole DM2: 
- on insulin Active Problems / Assessment AAActive  : Active Problems: 
  Tonsillar mass (10/26/2020) Subjective: HPI: Dana Sy is a 69 y.o.  female who has been admitted to the hospital for lump in her throat, difficulty swallowing. She has a hx of a renal tx, Cr stable. On prograf, MMF and prednisone. She had a bx of a tonsillar mass yesterday w/ ENT. She also had a recent EGD showing candida esophagitis and is on diflucan. Her Cr on admission was 2.4 and is down to 1.8. Unclear baseline as pt is a poor historian. Cr from earlier this month was 2. No prograf level here. She is c/o pain. No cp or SOB reported this AM 
 
Past Medical Hx:  
Past Medical History:  
Diagnosis Date  Chronic kidney disease  Diabetes ([facility]  Heart failure ([facility]  Hypertension  Kidney disease  Sleep apnea Past Surgical Hx: 
  
Past Surgical History:  
Procedure Laterality Date  HX CHOLECYSTECTOMY  HX RENAL TRANSPLANT Medications: 
Prior to Admission medications Medication Sig Start Date End Date Taking? Authorizing Provider  
amLODIPine (NORVASC) 10 mg tablet Take 10 mg by mouth daily.    Yes Provider, Historical  
metoprolol tartrate (LOPRESSOR) 50 mg tablet Take 50 mg by mouth two (2) times a day. Yes Provider, Historical  
tacrolimus (PROGRAF) 1 mg capsule Take 2 mg by mouth every twelve (12) hours. Yes Provider, Historical  
calcitRIOL (ROCALTROL) 0.25 mcg capsule Take 0.25 mcg by mouth daily. Provider, Historical  
hydrALAZINE (APRESOLINE) 50 mg tablet Take 25 mg by mouth three (3) times daily. Provider, Historical  
isosorbide mononitrate ER (IMDUR) 60 mg CR tablet Take  by mouth every morning. Provider, Historical  
mycophenolic acid (MYFORTIC) 530 mg delayed release tablet Take  by mouth two (2) times a day. Provider, Historical  
omeprazole (PRILOSEC) 40 mg capsule Take 40 mg by mouth daily. Provider, Historical  
sodium bicarbonate 650 mg tablet Take  by mouth four (4) times daily. Provider, Historical  
gemfibroziL (LOPID) 600 mg tablet Take 600 mg by mouth daily. Provider, Historical  
predniSONE (DELTASONE) 2.5 mg tablet Take  by mouth daily (with breakfast). Provider, Historical  
aspirin delayed-release 81 mg tablet Take  by mouth daily. Provider, Historical  
cholecalciferol (VITAMIN D3) (2,000 UNITS /50 MCG) cap capsule Take  by mouth two (2) times a day. Provider, Historical  
 
 
Allergies Allergen Reactions  Benadryl [Diphenhydramine Hcl] Other (comments)  Fentanyl Other (comments)  Morphine Unknown (comments)  Versed [Midazolam] Other (comments) Social Hx:  reports that she has never smoked. She has never used smokeless tobacco. She reports that she does not drink alcohol or use drugs. History reviewed. No pertinent family history. Review of Systems: A twelve point review of system was performed today. Pertinent positives and negatives are mentioned in the HPI. The reminder of the ROS is negative and noncontributory. Objective:   
Vitals:   
Vitals:  
 10/28/20 0534 10/28/20 0600 10/28/20 0634 10/28/20 0700 BP: (!) 174/71 (!) 165/74 (!) 188/78 (!) 165/98 Pulse:   63 Resp: Temp:      
SpO2:      
Weight:      
 
I&O's:  10/27 0701 - 10/28 0700 In: 610.3 [P.O.:75; I.V.:535.3] Out: 650 [Urine:650] Visit Vitals BP (!) 165/98 Pulse 63 Temp 97.9 °F (36.6 °C) Resp 28 Wt 64.4 kg (141 lb 15.6 oz) SpO2 98% BMI 25.15 kg/m² Physical Exam: 
General:Alert, No distress, HEENT: Eyes are PERRL. Conjunctiva without pallor ,erythema. The sclerae without icterus. .  
Neck:Supple,no mass palpable Lungs : Clears to auscultation Bilaterally, Normal respiratory effort CVS: RRR, S1 S2 normal, No rub,  no LE edema Abdomen: Soft, Non tender, No hepatosplenomegaly, bowel sounds present Extremities: No cyanosis, No clubbing Skin: No rash or lesions. Lymph nodes: No palpable nodes MS: No joint swelling, erythema, warmth Neurologic: non focal, AAO x 3 Psych: normal affect Laboratory Results: 
 
Lab Results Component Value Date BUN 49 (H) 10/28/2020  10/28/2020  
 K 5.2 (H) 10/28/2020  10/28/2020 CO2 24 10/28/2020 Lab Results Component Value Date BUN 49 (H) 10/28/2020 BUN 48 (H) 10/26/2020 BUN 42 (H) 10/08/2020  
 K 5.2 (H) 10/28/2020  
 K 4.5 10/26/2020  
 K 4.8 10/08/2020 Lab Results Component Value Date WBC 4.1 10/28/2020 RBC 3.57 (L) 10/28/2020 HGB 10.1 (L) 10/28/2020 HCT 33.8 (L) 10/28/2020 MCV 94.7 10/28/2020 MCH 28.3 10/28/2020 RDW 17.3 (H) 10/28/2020  10/28/2020 Lab Results Component Value Date PHOS 3.7 10/28/2020 Urine dipstick: No results found for: COLOR, APPRN, SPGRU, REFSG, ANDREA, PROTU, GLUCU, KETU, BILU, UROU, ROXANN, LEUKU, GLUKE, EPSU, BACTU, WBCU, RBCU, CASTS, UCRY Thank you for allowing us to participate in the care of this patient. We will follow patient. Please dont hesitate to call with any questions Calista Pickens MD 
10/28/2020 [address] [place] [address], [address] [facility] Phone - [phone] Fax - [phone] 
[email or web address]

## 2020-10-28 NOTE — DISCHARGE SUMMARY
Discharge Summary PATIENT ID: Sergio Carlson MRN: [id number] YOB: 1950 DATE OF ADMISSION: 10/26/2020  9:04 PM   
DATE OF DISCHARGE: 10/28/2020 PRIMARY CARE PROVIDER: Dutch Marques MD  
 
DISCHARGING PROVIDER: Verónica Gilliam MD   
To contact this individual call [phone] and ask the  to page. If unavailable ask to be transferred the Adult Hospitalist Department. CONSULTATIONS: IP CONSULT TO NEPHROLOGY 
IP CONSULT TO OTOLARYNGOLOGY PROCEDURES/SURGERIES: * No surgery found * ADMITTING DIAGNOSES & HOSPITAL COURSE:  
Tonsillar mass Per H and P Steven Rowley a 69 y. o. female who has a history of renal transplant presents with feeling a lump in her throat and coughing up phlegm for 2 weeks. She reports she went to see Gi for this and had EGD which showed Candida esophagitis She was on Dilfucan, She hd persistence of symptoms and presents to ED at [facility]. Admitted, and started on decadron, ENT evaluated and performed bedside biopsy. Results are pending. Patient was hypertensive, but now improved. Cellcept dose decreased due to fluconizole addition. Pt to follow up with nephrologist.  
 
Maribel Showers / PLAN:   
 
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  
- ENT Dr. Sage Knott, performed 10/27, biopsy pending - Resume soft foods and liquids at home.  
  
Recently diagnosed candidal esophagitis - Continue oral fluconazole, may consider consult if repeat EGD needed 
  
H/o CKD stage 3 with h/o renal transplant - Agree with resumption of home medications, as long as safe to swallow - Monitor levels per nephrology - I and O, avoid nephrotoxins  
  
HTN urgency - in setting of SOB, and not being able to take home medications - IV PRN hydralazine 
- Once able can resume home medications.  
   
 
ADDITIONAL CARE RECOMMENDATIONS:  
- Please take all medications as prescribed. Note changes as below.   
**Decrease your tacrolimus dose to 1mg BID **Add fluconizole for 14 days for esophagitis - Please make sure to follow up with your primary care physician within 1-2 weeks of discharge for hospital follow up. You also need to follow up with your kidney doctot about your immunosuppressant medications. Follow up with ENT regarding biopsy results. - Please make sure to continue to monitor for: worsening  Breathing, if you are unabel to eat or drink anything for over 24 hours, please return to the Emergency Department with any of these symptoms:  
- Please get up slowly from a seated or laying position, avoid falls. - Avoid tobacco, alcohol and other illicit drug use. PENDING TEST RESULTS:  
At the time of discharge the following test results are still pending: None FOLLOW UP APPOINTMENTS:   
Follow-up Information Follow up With Specialties Details Why Contact Info Trent Sarmiento MD Family Medicine   [address] [place] [id number] [phone] With your nephrologist   To follow up cellcept and CATALINA Bob MD Otolaryngology In 1 week Call and make an appointment for biopsy result Fercho Bennett [address] [place] [address] 
[phone] DIET: Encourage fluids and Dysphagia diet-pureed ACTIVITY: Activity as tolerated WOUND CARE: None EQUIPMENT needed: None DISCHARGE MEDICATIONS: 
Current Discharge Medication List  
  
START taking these medications Details  
fluconazole (DIFLUCAN) 50 mg tablet Take 2 Tabs by mouth daily for 14 days. FDA advises cautious prescribing of oral fluconazole in pregnancy. Qty: 28 Tab, Refills: 0 CONTINUE these medications which have CHANGED Details  
tacrolimus (PROGRAF) 1 mg capsule Take 1 Cap by mouth every twelve (12) hours for 30 days. Qty: 60 Cap, Refills: 0 CONTINUE these medications which have NOT CHANGED Details  
amLODIPine (NORVASC) 10 mg tablet Take 10 mg by mouth daily.   
  
metoprolol tartrate (LOPRESSOR) 50 mg tablet Take 50 mg by mouth two (2) times a day. calcitRIOL (ROCALTROL) 0.25 mcg capsule Take 0.25 mcg by mouth daily. hydrALAZINE (APRESOLINE) 50 mg tablet Take 50 mg by mouth three (3) times daily. isosorbide mononitrate ER (IMDUR) 60 mg CR tablet Take 60 mg by mouth every morning. mycophenolic acid (MYFORTIC) 906 mg delayed release tablet Take 360 mg by mouth two (2) times a day. omeprazole (PRILOSEC) 40 mg capsule Take 40 mg by mouth daily. sodium bicarbonate 650 mg tablet Take 1,300 mg by mouth four (4) times daily. gemfibroziL (LOPID) 600 mg tablet Take 600 mg by mouth daily. predniSONE (DELTASONE) 2.5 mg tablet Take 7.5 mg by mouth daily (with breakfast). aspirin delayed-release 81 mg tablet Take 81 mg by mouth daily. cholecalciferol (VITAMIN D3) (2,000 UNITS /50 MCG) cap capsule Take  by mouth two (2) times a day. NOTIFY YOUR PHYSICIAN FOR ANY OF THE FOLLOWING:  
Fever over 101 degrees for 24 hours. Chest pain, shortness of breath, fever, chills, nausea, vomiting, diarrhea, change in mentation, falling, weakness, bleeding. Severe pain or pain not relieved by medications. Or, any other signs or symptoms that you may have questions about. DISPOSITION: 
 X Home With: 
 OT  PT  HH  RN  
  
 Long term SNF/Inpatient Rehab Independent/assisted living Hospice Other:  
 
 
PATIENT CONDITION AT DISCHARGE:  
 
Functional status Poor Deconditioned X Independent Cognition X  Lucid Forgetful Dementia Catheters/lines (plus indication) Ruiz PICC   
 PEG   
X None Code status X Full code DNR   
 
PHYSICAL EXAMINATION AT DISCHARGE: 
Visit Vitals BP (!) 163/57 Pulse (!) 54 Temp 98.3 °F (36.8 °C) Resp 26 Wt 64.4 kg (141 lb 15.6 oz) SpO2 99% BMI 25.15 kg/m² Gen: NAD, awake in bed HEENT: NC/AT, sclera anicteric, PERRL, EOMI, large mass seen in oropharynx.   
CV: RRR no m/r/g, normal S1 and S2, no pedal edema Resp: CTA b/l no increased work of breathing, no wheezing or rhonchi, speaking in full sentences Abd: NT/ND, normal bowel sounds, no rebound or guarding Ext: 2+ pulses, no edema Skin: No rashes or lesions CHRONIC MEDICAL DIAGNOSES: 
Problem List as of 10/28/2020 Date Reviewed: 10/28/2020 Codes Class Noted - Resolved Tonsillar mass ICD-10-CM: J35.8 ICD-9-CM: 474.8  10/26/2020 - Present Greater than 30 minutes were spent with the patient on counseling and coordination of care Signed:  
Vesta Cole MD 
10/28/2020 
2:56 PM

## 2020-10-28 NOTE — PROGRESS NOTES
Advance Care Planning Advance Care Planning Activator (Inpatient) Conversation Note Date of ACP Conversation: 10/28/20 Conversation Conducted with:   Patient with Decision Making Capacity ACP Activator: Farhan Lafleur RN 
 
*When Decision Maker makes decisions on behalf of the incapacitated patient: Decision Maker is asked to consider and make decisions based on patient values, known preferences, or best interests. Health Care Decision Maker: 
 
Current Designated Health Care Decision Maker:   Primary Decision Maker: Chandra Vallejo - [facility] - [phone] (If there is a valid [place] named in the [facility] Makers\" box in the ACP activity, but it is not visible above, be sure to open that field and then select the health care decision maker relationship (ie \"primary\") in the blank space to the right of the name.) Validate  this information as still accurate & up-to-date; edit [place] field as needed.) Note: Assess and validate information in current ACP documents, as indicated. If no Decision Maker listed above or available through scanned documents, then: 
 
If no Authorized Decision Maker has previously been identified, then patient chooses [place]: \"Who would you like to name as your primary health care decision-maker? \" Name: [place]   Relationship: child Phone number: [phone] \"Can this person be reached easily? \" Cole [facility] Care Preferences Ventilation: \"If you were in your present state of health and suddenly became very ill and were unable to breathe on your own, what would your preference be about the use of a ventilator (breathing machine) if it were available to you? \" If patient would desire the use of a ventilator (breathing machine), answer \"yes\", if not \"no\":yes \"If your health worsens and it becomes clear that your chance of recovery is unlikely, what would your preference be about the use of a ventilator (breathing machine) if it were available to you? \" Would the patient desire the use of a ventilator (breathing machine)? YES Resuscitation \"CPR works best to restart the heart when there is a sudden event, like a heart attack, in someone who is otherwise healthy. Unfortunately, CPR does not typically restart the heart for people who have serious health conditions or who are very sick. \" \"In the event your heart stopped as a result of an underlying serious health condition, would you want attempts to be made to restart your heart (answer \"yes\" for attempt to resuscitate) or would you prefer a natural death (answer \"no\" for do not attempt to resuscitate)? \" yes [x] Yes  [] No   Educated Patient / Linda Licona regarding differences between Advance Directives and portable DNR orders. Length of ACP Conversation in minutes:   10 Conversation Outcomes: 
[x] ACP discussion completed 
[] Existing advance directive reviewed with patient; no changes to patient's previously recorded wishes 
 
 [] New Advance Directive completed 
 [] Portable Do Not Resuscitate prepared for Provider review and signature 
[] POLST/POST/MOLST/MOST prepared for Provider review and signature Follow-up plan:   
[] Schedule follow-up conversation to continue planning 
[] Referred individual to Provider for additional questions/concerns  
[] Advised patient/agent/surrogate to review completed ACP document and update if needed with changes in condition, patient preferences or care setting  
 
[] This note routed to one or more involved healthcare providers

## 2020-10-28 NOTE — PROGRESS NOTES
Spiritual Care Assessment/Progress Note [address] 
 
 
NAME: Val Lucero      MRN: [id number] AGE: 69 y.o. SEX: female Scientologist Affiliation: Hafsa Goff Language: English  
 
10/28/2020     Total Time (in minutes): 5 Spiritual Assessment begun in [facility] 4 IMCU through conversation with: 
  
    []Patient        [] Family    [] Friend(s) Reason for Consult: Initial/Spiritual assessment, patient floor Spiritual beliefs: (Please include comment if needed) 
   [] Identifies with a monica tradition:     
   [] Supported by a monica community:        
   [] Claims no spiritual orientation:       
   [] Seeking spiritual identity:            
   [] Adheres to an individual form of spirituality:       
   [x] Not able to assess:                   
 
    
Identified resources for coping:  
   [] Prayer                           
   [] Music                  [] Guided Imagery 
   [] Family/friends                 [] Pet visits [] Devotional reading                         [x] Unknown 
   [] Other:                                          
 
 
Interventions offered during this visit: (See comments for more details) Plan of Care: 
 
 [] Support spiritual and/or cultural needs  
 [] Support AMD and/or advance care planning process    
 [] Support grieving process 
 [] Coordinate Rites and/or Rituals  
 [] Coordination with community clergy [] No spiritual needs identified at this time 
 [] Detailed Plan of Care below (See Comments)  [] Make referral to Music Therapy 
[] Make referral to Pet Therapy    
[] Make referral to Addiction services 
[] Make referral to [facility] 
[] Make referral to Spiritual Care Partner 
[] No future visits requested       
[x] Follow up visits as needed Comments:  visit for initial spiritual assessment per consult. Patient sleeping in bed, appears comfortable.   Did not awaken to knock at door, verbal greeting x 2, or presence at bedside. Left card informing patient of  visit and information on availability of  and pastoral care services. Will continue to follow up as needed and upon request as able. Visited by Rev. Meg Moise MDiv, [facility], [address] [facility]  paging service: [phone])

## 2020-10-28 NOTE — PROGRESS NOTES
Problem: Falls - Risk of 
Goal: Absence of Falls Outcome: Progressing Towards Goal 
Call light in reach, Patient to call for help with toileting needs, Stay With Me (per policy), Toileting schedule/hourly rounds Problem: Hypertension Goal: Blood pressure within specified parameters Outcome: Not Progressing Towards Goal 
Pt on PO hdyralazine, IV hydralazine, Isobobide PO. BP still remains greater than 170, will continue to monitor 0436-pt is complaining of a very bad headache, tearful and crying. BP elevated at 189/89, when she is awake, was lower at 152/65 when she was sleeping. Pt was given IV hydralazine 10mg at 2357. Walker Amadou NP notified, order received for another dose of Hydralazine 10mg IV and tylenol PO for the headache, will continue to monitor 0549-Pt states that her pain level in her head is now down to 5/10, will continue to monitor Hourly rounding done, pt in NSR/SB, on 2L NC for comfort, O2 saturation greater than 90%, ambulated to [facility], urine yellow and clear, no complaints of pain throughout shift. 0800-Bedside shift change report given to [address] [place] [address] (oncoming nurse) by Jayna Huerta RN (offgoing nurse). Report included the following information SBAR, Kardex, ED Summary, Intake/Output, MAR, Recent Results, Med Rec Status and Cardiac Rhythm NSR/SB. Last 3 Recorded Weights in this Encounter 10/27/20 1801 10/28/20 0400 Weight: 64 kg (141 lb 1.5 oz) 64.4 kg (141 lb 15.6 oz)

## 2020-10-28 NOTE — PROGRESS NOTES
1700 - TRANSFER - IN REPORT: 
 
Verbal report received from SPARKLE West(name) on [place]  being received from ED (unit) for routine progression of care Report consisted of patients Situation, Background, Assessment and  
Recommendations(SBAR). Information from the following report(s) SBAR, Kardex, ED Summary, Procedure Summary, Intake/Output, MAR, Recent Results and Med Rec Status was reviewed with the receiving nurse. Opportunity for questions and clarification was provided. Assessment completed upon patients arrival to unit and care assumed. Primary Nurse Willie Morgan and SPARKLE Harden performed a dual skin assessment on this patient Impairment noted- see wound doc flow sheet Bi score is 22

## 2020-11-12 NOTE — PROGRESS NOTES
Pt stable, IV taken out. Discharge education given and with verbal understanding. Patient exited facility via wheelchair to ride outside.

## 2020-11-12 NOTE — PROGRESS NOTES
Assumed care of the patient. Alert and responsive. Resp even and unlabored. No sob noted. Verified ID band with verbal response to name and . Allergies noted. Meds reviewed. Blood consent signed. Orders initiated. VSS. IVL placed in right forearm. Site stable. Sitter at the bedside. Labs sent down to the blood bank. Denies Pain or discomfort.

## 2020-11-12 NOTE — PROGRESS NOTES
Handoff report received from [address] [place] [address]. Blood infusing without difficulty.

## 2020-11-14 PROBLEM — I50.9 CHF (CONGESTIVE HEART FAILURE) (HCC): Status: ACTIVE | Noted: 2020-01-01

## 2020-11-14 NOTE — ED PROVIDER NOTES
74-year-old female with a history of congestive heart failure recent diagnosis of oral pharyngeal cancer presents to emergency department by EMS complaining of shortness of breath which began yesterday and worse this early morning. She denies chest pain fever sputum production she does note chronic lower extremity edema which is unchanged. Daughter states is been no exposure to Covid that she knows of. She is scheduled to begin treatment of her oral pharyngeal cancer November 23 with chemotherapy in the oncology center at [facility]. 
 
Past Medical history is remarkable hypertension congestive heart failure and oral-tonsil cancer Review of all other systems is negative Physical exam elderly -American female moderately dyspneic oxygen saturation initially 88% on room air improved to 96% with 2 L Head is normocephalic atraumatic eyes show pupils equal reactive light extraocular muscle intact conjunctiva is normal, nose is normal lips and mouth are normal well-hydrated whitish plaque-like lesion in left tonsil, neck is supple without JVD bruit or lymphadenopathy, lungs show rales in the bases bilaterally good air exchange no rhonchi or wheezes, cardiac exam is regular rhythm without murmur or rub, abdomen is soft obese nontender no mass palpable, extremities show no evidence of trauma there is 1-2+ edema bilaterally lower extremities which appears chronic. Neurologic exam awake alert oriented to person place and day no focal motor or sensory deficit is noted. EKG shows a sinus rhythm at 65 nonspecific ST-T changes no acute injury or obvious ischemic changes there is LVH by voltage. Labs remarkable for a mildly elevated D-dimer minimal troponin elevation. Case discussed with hospitalist Kayleigh Christie agrees to admit patient. Record care 30 minutes for congestive heart failure and acute dyspnea treated with IV Lasix and nitrates with improvement.   Diagnosis acute congestive heart failure

## 2020-11-14 NOTE — ROUTINE PROCESS
Notified lab of 1600 Troponin and that it could not be drawn. Patient was a hard stick. Lab stated they will come up to do it.

## 2020-11-14 NOTE — ROUTINE PROCESS
Verbal report received from [address] [facility] [address] on [place] Nose  being received from ER. Report consisted of patients Situation, Background, Assessment and  
Recommendations(SBAR). Information from the following report(s) SBAR was reviewed with the receiving nurse. Opportunity for questions and clarification was provided. Assessment completed upon patients arrival to unit and care assumed.

## 2020-11-14 NOTE — ED TRIAGE NOTES
Pt states she feels like she is unable to get air into her lung she states she feels like something is blocking her airway. Pt has hx of throat cancer
No

## 2020-11-14 NOTE — PROGRESS NOTES
Problem: Falls - Risk of 
Goal: *Absence of Falls Description: Document Patti Gonzalez Fall Risk and appropriate interventions in the flowsheet. Outcome: Progressing Towards Goal 
Note: Fall Risk Interventions: 
  
 
  
 
  
 
  
 
  
 
 
  
Problem: Patient Education: Go to Patient Education Activity Goal: Patient/Family Education Outcome: Progressing Towards Goal 
  
Problem: Heart Failure: Day 1 Goal: Activity/Safety Outcome: Progressing Towards Goal 
Goal: Consults, if ordered Outcome: Progressing Towards Goal

## 2020-11-14 NOTE — H&P
History & Physical 
 
Primary Care Provider: Lavon Miller MD 
Source of Information: Patient History of Presenting Illness:  
Monroe Almazan is a 69 y.o. female with recent diagnosis of throat cancer not on chemotherapy or radiation. Presents to the ED with reports of worsening shortness of breath and nonproductive cough. No fevers at home. Notes bilateral leg swelling. Shortness of breath is worse with exertion and improves with rest.  Chest x-ray in the ED showed increased vascular congestion. Oxygen saturation on room air is 74% and improved to 94% on 4 L nasal oxygen. Uses oxygen as needed at home Review of Systems: A comprehensive review of systems was negative except for that written in the History of Present Illness. Past Medical History:  
Diagnosis Date  Chronic kidney disease  Diabetes ([facility]  Heart failure ([facility] [address]  Hypertension  Kidney disease  Sleep apnea  Throat cancer ([facility] [address] Past Surgical History:  
Procedure Laterality Date  HX CHOLECYSTECTOMY  HX RENAL TRANSPLANT Prior to Admission medications Medication Sig Start Date End Date Taking? Authorizing Provider  
tacrolimus (PROGRAF) 1 mg capsule Take 1 Cap by mouth every twelve (12) hours for 30 days. 10/28/20 11/27/20  Mariia SNOW MD  
amLODIPine (NORVASC) 10 mg tablet Take 10 mg by mouth daily. Provider, Historical  
metoprolol tartrate (LOPRESSOR) 50 mg tablet Take 50 mg by mouth two (2) times a day. Provider, Historical  
calcitRIOL (ROCALTROL) 0.25 mcg capsule Take 0.25 mcg by mouth daily. Provider, Historical  
hydrALAZINE (APRESOLINE) 50 mg tablet Take 50 mg by mouth three (3) times daily. Provider, Historical  
isosorbide mononitrate ER (IMDUR) 60 mg CR tablet Take 60 mg by mouth every morning.     Provider, Historical  
mycophenolic acid (MYFORTIC) 819 mg delayed release tablet Take 360 mg by mouth two (2) times a day.    Provider, Historical  
omeprazole (PRILOSEC) 40 mg capsule Take 40 mg by mouth daily. Provider, Historical  
sodium bicarbonate 650 mg tablet Take 1,300 mg by mouth four (4) times daily. Provider, Historical  
gemfibroziL (LOPID) 600 mg tablet Take 600 mg by mouth daily. Provider, Historical  
predniSONE (DELTASONE) 2.5 mg tablet Take 7.5 mg by mouth daily (with breakfast). Provider, Historical  
aspirin delayed-release 81 mg tablet Take 81 mg by mouth daily. Provider, Historical  
cholecalciferol (VITAMIN D3) (2,000 UNITS /50 MCG) cap capsule Take  by mouth two (2) times a day. Provider, Historical  
 
Allergies Allergen Reactions  Benadryl [Diphenhydramine Hcl] Other (comments)  Fentanyl Other (comments)  Morphine Unknown (comments)  Versed [Midazolam] Other (comments) History reviewed. No pertinent family history. SOCIAL HISTORY: 
Patient resides: 
[facility]epend[facility] With family care x Smoking history:  
None x Former Chronic Alcohol history:  
None x Social   
Chronic Ambulates:  
Independently x  
w/cane   
w/walker   
w/wc CODE STATUS: 
DNR Full x Other Objective:  
 
Physical Exam:  
 
Visit Vitals BP (!) 167/92 Pulse 73 Temp 98.2 °F (36.8 °C) Resp 21 SpO2 96% O2 Flow Rate (L/min): 4 l/min O2 Device: Nasal cannula General:  Alert, cooperative, no distress, appears stated age. Head:  Normocephalic, without obvious abnormality, atraumatic. Eyes:  Conjunctivae/corneas clear. PERRL, EOMs intact. Nose: Nares normal. Septum midline. Mucosa normal. No drainage or sinus tenderness. Throat: Lips, mucosa, and tongue normal. Teeth and gums normal.  
Neck: Supple, symmetrical, trachea midline, no adenopathy, thyroid: no enlargement/tenderness/nodules, no carotid bruit and no JVD. Back:   Symmetric, no curvature. ROM normal. No CVA tenderness. Lungs:    Basilar crackles.   
Chest wall: No tenderness or deformity. Heart:  Regular rate and rhythm, S1, S2 normal, no murmur, click, rub or gallop. Abdomen:   Soft, non-tender. Bowel sounds normal. No masses,  No organomegaly. Extremities: Extremities normal, atraumatic, no cyanosis or edema. Pulses: 2+ and symmetric all extremities. Skin: Skin color, texture, turgor normal. No rashes or lesions Neurologic: CNII-XII intact. No motor or sensory deficits. EKG:  normal EKG, normal sinus rhythm, unchanged from previous tracings, sinus tachycardia. Data Review:  
 
Recent Days: 
Recent Labs 11/14/20 
1138 WBC 6.0 HGB 11.1*  
HCT 33.7*  
 Recent Labs 11/14/20 
1138 *  
K 4.5  
CL 97  
CO2 22 * BUN 53* CREA 2.43* CA 9.1 ALB 2.9* TBILI 0.7 ALT 71 INR 1.2* No results for input(s): PH, PCO2, PO2, HCO3, FIO2 in the last 72 hours. 24 Hour Results: 
Recent Results (from the past 24 hour(s)) CBC WITH AUTOMATED DIFF Collection Time: 11/14/20 11:38 AM  
Result Value Ref Range WBC 6.0 4.4 - 11.3 K/uL  
 RBC 3.90 (L) 4.50 - 5.90 M/uL  
 HGB 11.1 (L) 13.5 - 17.5 g/dL HCT 33.7 (L) 36 - 46 % MCV 86.3 80 - 100 FL  
 MCH 28.5 (L) 31 - 34 PG  
 MCHC 33.0 31.0 - 36.0 g/dL  
 RDW 19.4 (H) 11.5 - 14.5 % PLATELET 592 K/uL MPV 7.9 6.5 - 11.5 FL  
 NRBC 10.0  WBC ABSOLUTE NRBC 0.01 K/uL NEUTROPHILS 84 (H) 42 - 75 % LYMPHOCYTES 5 (L) 20.5 - 51.1 % MONOCYTES 11 (H) 1.7 - 9.3 % EOSINOPHILS 0 (L) 0.9 - 2.9 % BASOPHILS 0 0.0 - 2.5 % ABS. NEUTROPHILS 5.0 1.8 - 7.7 K/UL  
 ABS. LYMPHOCYTES 0.3 (L) 1.0 - 4.8 K/UL  
 ABS. MONOCYTES 0.6 0.2 - 2.4 K/UL  
 ABS. EOSINOPHILS 0.0 0.0 - 0.7 K/UL  
 ABS. BASOPHILS 0.0 0.0 - 0.2 K/UL PROTHROMBIN TIME + INR Collection Time: 11/14/20 11:38 AM  
Result Value Ref Range Prothrombin time 11.7 (H) 9.0 - 11.1 sec INR 1.2 (H) 0.9 - 1.1 D DIMER Collection Time: 11/14/20 11:38 AM  
Result Value Ref Range D-dimer 2.65 (H) 0.19 - 0.50 mg/L FEU METABOLIC PANEL, COMPREHENSIVE Collection Time: 11/14/20 11:38 AM  
Result Value Ref Range Sodium 129 (L) 136 - 145 mmol/L Potassium 4.5 3.5 - 5.1 mmol/L Chloride 97 97 - 108 mmol/L  
 CO2 22 21 - 32 mmol/L Anion gap 10 5 - 15 mmol/L Glucose 284 (H) 65 - 100 mg/dL BUN 53 (H) 6 - 20 mg/dL Creatinine 2.43 (H) 0.55 - 1.02 mg/dL BUN/Creatinine ratio 22 (H) 12 - 20 GFR est AA 24 (L) >60 ml/min/1.73m2 GFR est non-AA 20 (L) >60 ml/min/1.73m2 Calcium 9.1 8.5 - 10.1 mg/dL Bilirubin, total 0.7 0.2 - 1.0 mg/dL AST (SGOT) 20 15 - 37 U/L  
 ALT (SGPT) 71 12 - 78 U/L Alk. phosphatase 133 (H) 45 - 117 U/L Protein, total 7.2 6.4 - 8.2 g/dL Albumin 2.9 (L) 3.5 - 5.0 g/dL Globulin 4.3 (H) 2.0 - 4.0 g/dL A-G Ratio 0.7 (L) 1.1 - 2.2    
TROPONIN I Collection Time: 11/14/20 11:38 AM  
Result Value Ref Range Troponin-I, Qt. <0.05 <0.05 ng/mL Imaging:  
 
Assessment:  
 
Acute congestive heart failure unspecified 
  -Begin Lasix 40 mg IV twice daily 
  -Fluid restriction to 1500 mL daily 
  -Strict input and output 
  -Monitor daily electrolytes 
  -Obtain 2D echocardiogram/cardiology evaluation History of throat cancer 
  -Recent evaluation at [address] [place] [address] soft tissue neck from 10/28 reviewed. Patient was seen by ENT at [facility] 
  -Patient to follow-up out patient at [facility] medical oncology center History of renal transplant 
   -On immunosuppressants 
   -We will resume home dose and consider nephrology evaluation Plan:  
Admit to inpatient telemetry floor Continue supportive care as indicated above Plan of care discussed with daughter at bedside She is full code Signed By: Eric Schuster MD   
 November 14, 2020

## 2020-11-15 NOTE — ROUTINE PROCESS
Patient has poor venous access for IV access and lab draws. 24 g that was placed in ED has infiltrated. Unable to obtain IV access at this time. Notified Dr. Mohan Farmer, new order for consult for PICC insertion and 1x dose of PO lasix to replace the dose of IV lasix that cannot be administered d/t lack of IV access.

## 2020-11-15 NOTE — PROGRESS NOTES
Problem: Falls - Risk of 
Goal: *Absence of Falls Description: Document Veatrice Marker Fall Risk and appropriate interventions in the flowsheet. Outcome: Progressing Towards Goal 
Note: Fall Risk Interventions: 
  
 
  
 
Medication Interventions: Patient to call before getting OOB Elimination Interventions: Call light in reach, Patient to call for help with toileting needs Problem: Patient Education: Go to Patient Education Activity Goal: Patient/Family Education Outcome: Progressing Towards Goal 
  
Problem: Heart Failure: Day 1 Goal: Activity/Safety Outcome: Progressing Towards Goal 
Goal: Consults, if ordered Outcome: Progressing Towards Goal 
  
Problem: Pressure Injury - Risk of 
Goal: *Prevention of pressure injury Description: Document Bi Scale and appropriate interventions in the flowsheet. Outcome: Progressing Towards Goal 
Note: Pressure Injury Interventions: 
Sensory Interventions: Check visual cues for pain, Keep linens dry and wrinkle-free, Minimize linen layers Moisture Interventions: Absorbent underpads, Limit adult briefs Activity Interventions: PT/OT evaluation Mobility Interventions: Float heels, HOB 30 degrees or less Nutrition Interventions: Document food/fluid/supplement intake Problem: Patient Education: Go to Patient Education Activity Goal: Patient/Family Education Outcome: Progressing Towards Goal

## 2020-11-15 NOTE — PROGRESS NOTES
Problem: Falls - Risk of 
Goal: *Absence of Falls Description: Document Salomon Rosas Fall Risk and appropriate interventions in the flowsheet. Outcome: Progressing Towards Goal 
Note: Fall Risk Interventions: 
  
 
  
 
Medication Interventions: Patient to call before getting OOB Elimination Interventions: Call light in reach Problem: Patient Education: Go to Patient Education Activity Goal: Patient/Family Education Outcome: Progressing Towards Goal 
  
Problem: Heart Failure: Day 1 Goal: Activity/Safety Outcome: Progressing Towards Goal 
Goal: Consults, if ordered Outcome: Progressing Towards Goal 
  
Problem: Pressure Injury - Risk of 
Goal: *Prevention of pressure injury Description: Document Bi Scale and appropriate interventions in the flowsheet. Outcome: Progressing Towards Goal 
Note: Pressure Injury Interventions: 
Sensory Interventions: Check visual cues for pain Moisture Interventions: Absorbent underpads Activity Interventions: PT/OT evaluation Mobility Interventions: Float heels Nutrition Interventions: Document food/fluid/supplement intake Problem: Patient Education: Go to Patient Education Activity Goal: Patient/Family Education Outcome: Progressing Towards Goal

## 2020-11-15 NOTE — PROGRESS NOTES
Progress Note Date:11/15/2020       YZGK:[id number]/[id number] Patient Emilee Trevino     Date of Birth:[birth year]     Age:70 y.o. Subjective Jimena Mooney is a 69 y.o. female with recent diagnosis of throat cancer not on chemotherapy or radiation. Presents to the ED with reports of worsening shortness of breath and nonproductive cough. No fevers at home. Notes bilateral leg swelling. Shortness of breath is worse with exertion and improves with rest.  Chest x-ray in the ED showed increased vascular congestion. Oxygen saturation on room air is 74% and improved to 94% on 4 L nasal oxygen. Uses oxygen as needed at home. Patient recently at [facility] and was diagnosed prelim with high grade/large B cell lympoma and had follow up with oncologist Dr Angela Peñaloza to start process for chemotherapy. . it was noted that blood level was low so patient was arranged from blood transfusion of 3 units of  RBC on 11/12. Patient was recentlly increased by nephrologist from lasix 80mg daily to 80mg in AM and 40mg in pm. Patient presents with sudden Acute on chronic hypoxic respiratory failure secondary to fluid overload and was referred for admission. Patient seen and evaluate . Awake and alert and oriented. Soft spoken. Continues to require increased o2 at 4-5L NC IV access was lost. DW with nephrology and prefers to wait at this time and increase lasix to 80mg oral BID. Follow up with nephrology. Fluid restriction. Review of Systems Constitutional: Positive for fatigue. HENT: Negative. Respiratory: Positive for shortness of breath. Cardiovascular: Negative. Gastrointestinal: Negative. Endocrine: Negative. Genitourinary: Negative. Musculoskeletal: Negative. Allergic/Immunologic: Negative. Neurological: Negative. Hematological: Negative. Psychiatric/Behavioral: Negative. Objective Vitals Last 24 Hours: 
Patient Vitals for the past 24 hrs: 
 Temp Pulse Resp BP SpO2 11/15/20 1136 96.8 °F (36 °C) 72 18 (!) 156/73 90 % 11/15/20 0731 96.9 °F (36.1 °C) (!) 59 18 (!) 179/74 90 % 11/15/20 0400 98.5 °F (36.9 °C) 66 20 (!) 162/72 93 % 11/15/20 0000 98.1 °F (36.7 °C) 60 18 133/60 95 % 11/14/20 2000  67   93 % 11/14/20 1940 98.4 °F (36.9 °C) 67 18 (!) 156/67 93 % 11/14/20 1620 98.3 °F (36.8 °C) 67 19 (!) 177/76 90 % 11/14/20 1600  97     
11/14/20 1350  79  (!) 195/91   
11/14/20 1250  73  (!) 167/92  I/O (24Hr): Intake/Output Summary (Last 24 hours) at 11/15/2020 1242 Last data filed at 11/14/2020 2310 Gross per 24 hour Intake  Output 500 ml Net -500 ml Physical Exam: 
General: Alert, cooperative, no distress, appears stated age. Head:  Normocephalic, without obvious abnormality, atraumatic. Eyes:  Conjunctivae/corneas clear. Pupils equal, round, reactive to light. Extraocular movements intact. Lungs:  Clear to auscultation bilaterally but dmminished. no wheeze, rales, crackles, rhonchi Chest wall: No tenderness or deformity. Heart:  Regular rate and rhythm, S1, S2 normal, no murmur, click, rub or gallop. Abdomen:  Soft, non-tender. Bowel sounds normal. No masses,  No organomegaly. Extremities: Extremities normal, atraumatic, no cyanosis or edema. Pulses: 2+ and symmetric all extremities. Skin: Skin color, texture, turgor normal. No rashes or lesions Neurologic: Awake, Alert, oriented. No obvious gross sensory or motor deficits Medications Current Facility-Administered Medications Medication Dose Route Frequency  glucose chewable tablet 16 g  4 Tab Oral PRN  
 dextrose (D50W) injection syrg 12.5-25 g  25-50 mL IntraVENous PRN  
 glucagon (GLUCAGEN) injection 1 mg  1 mg IntraMUSCular PRN  
 insulin lispro (HUMALOG) injection   SubCUTAneous AC&HS  
 zolpidem (AMBIEN) tablet 5 mg  5 mg Oral QHS  metoprolol tartrate (LOPRESSOR) tablet 25 mg  25 mg Oral BID  furosemide (LASIX) tablet 80 mg  80 mg Oral ACB&D  [START ON 11/16/2020] mycophenolate mofetil (CELLCEPT) capsule 500 mg  500 mg Oral DAILY  mycophenolate mofetil (CELLCEPT) capsule 250 mg  250 mg Oral QPM  
 sodium chloride (NS) flush 5-40 mL  5-40 mL IntraVENous Q8H  
 sodium chloride (NS) flush 5-40 mL  5-40 mL IntraVENous PRN  
 acetaminophen (TYLENOL) tablet 650 mg  650 mg Oral Q6H PRN Or  
 acetaminophen (TYLENOL) suppository 650 mg  650 mg Rectal Q6H PRN  polyethylene glycol (MIRALAX) packet 17 g  17 g Oral DAILY PRN  
 ondansetron (ZOFRAN) injection 4 mg  4 mg IntraVENous Q6H PRN  
 amLODIPine (NORVASC) tablet 10 mg  10 mg Oral DAILY  aspirin delayed-release tablet 81 mg  81 mg Oral DAILY  calcitRIOL (ROCALTROL) capsule 0.25 mcg  0.25 mcg Oral DAILY  gemfibroziL (LOPID) tablet 600 mg  600 mg Oral DAILY  hydrALAZINE (APRESOLINE) tablet 50 mg  50 mg Oral TID  isosorbide mononitrate ER (IMDUR) tablet 60 mg  60 mg Oral 7am  
 pantoprazole (PROTONIX) tablet 40 mg  40 mg Oral ACB  predniSONE (DELTASONE) tablet 7.5 mg  7.5 mg Oral DAILY WITH BREAKFAST  sodium bicarbonate tablet 1,300 mg  1,300 mg Oral QID  tacrolimus (PROGRAF) capsule 1 mg  1 mg Oral Q12H Allergies Benadryl [diphenhydramine hcl]; Fentanyl; Morphine; and Versed [midazolam] Labs/Imaging/Diagnostics Labs: 
Recent Results (from the past 48 hour(s)) CBC WITH AUTOMATED DIFF Collection Time: 11/14/20 11:38 AM  
Result Value Ref Range WBC 6.0 4.4 - 11.3 K/uL  
 RBC 3.90 (L) 4.50 - 5.90 M/uL  
 HGB 11.1 (L) 13.5 - 17.5 g/dL HCT 33.7 (L) 36 - 46 % MCV 86.3 80 - 100 FL  
 MCH 28.5 (L) 31 - 34 PG  
 MCHC 33.0 31.0 - 36.0 g/dL  
 RDW 19.4 (H) 11.5 - 14.5 % PLATELET 660 K/uL MPV 7.9 6.5 - 11.5 FL  
 NRBC 10.0  WBC ABSOLUTE NRBC 0.01 K/uL NEUTROPHILS 84 (H) 42 - 75 % LYMPHOCYTES 5 (L) 20.5 - 51.1 % MONOCYTES 11 (H) 1.7 - 9.3 % EOSINOPHILS 0 (L) 0.9 - 2.9 % BASOPHILS 0 0.0 - 2.5 % ABS. NEUTROPHILS 5.0 1.8 - 7.7 K/UL  
 ABS. LYMPHOCYTES 0.3 (L) 1.0 - 4.8 K/UL  
 ABS. MONOCYTES 0.6 0.2 - 2.4 K/UL  
 ABS. EOSINOPHILS 0.0 0.0 - 0.7 K/UL  
 ABS. BASOPHILS 0.0 0.0 - 0.2 K/UL PROTHROMBIN TIME + INR Collection Time: 11/14/20 11:38 AM  
Result Value Ref Range Prothrombin time 11.7 (H) 9.0 - 11.1 sec INR 1.2 (H) 0.9 - 1.1 D DIMER Collection Time: 11/14/20 11:38 AM  
Result Value Ref Range D-dimer 2.65 (H) 0.19 - 0.50 mg/L FEU METABOLIC PANEL, COMPREHENSIVE Collection Time: 11/14/20 11:38 AM  
Result Value Ref Range Sodium 129 (L) 136 - 145 mmol/L Potassium 4.5 3.5 - 5.1 mmol/L Chloride 97 97 - 108 mmol/L  
 CO2 22 21 - 32 mmol/L Anion gap 10 5 - 15 mmol/L Glucose 284 (H) 65 - 100 mg/dL BUN 53 (H) 6 - 20 mg/dL Creatinine 2.43 (H) 0.55 - 1.02 mg/dL BUN/Creatinine ratio 22 (H) 12 - 20 GFR est AA 24 (L) >60 ml/min/1.73m2 GFR est non-AA 20 (L) >60 ml/min/1.73m2 Calcium 9.1 8.5 - 10.1 mg/dL Bilirubin, total 0.7 0.2 - 1.0 mg/dL AST (SGOT) 20 15 - 37 U/L  
 ALT (SGPT) 71 12 - 78 U/L Alk. phosphatase 133 (H) 45 - 117 U/L Protein, total 7.2 6.4 - 8.2 g/dL Albumin 2.9 (L) 3.5 - 5.0 g/dL Globulin 4.3 (H) 2.0 - 4.0 g/dL A-G Ratio 0.7 (L) 1.1 - 2.2    
TROPONIN I Collection Time: 11/14/20 11:38 AM  
Result Value Ref Range Troponin-I, Qt. <0.05 <0.05 ng/mL GLUCOSE, POC Collection Time: 11/14/20  4:31 PM  
Result Value Ref Range Glucose (POC) 227 (H) 65 - 100 mg/dL Performed by Margrett Cuff   
TROPONIN I Collection Time: 11/14/20  6:50 PM  
Result Value Ref Range Troponin-I, Qt. <0.05 <0.05 ng/mL METABOLIC PANEL, COMPREHENSIVE Collection Time: 11/15/20  7:00 AM  
Result Value Ref Range Sodium 137 136 - 145 mmol/L Potassium 4.3 3.5 - 5.1 mmol/L Chloride 101 97 - 108 mmol/L  
 CO2 27 21 - 32 mmol/L Anion gap 9 5 - 15 mmol/L Glucose 167 (H) 65 - 100 mg/dL BUN 46 (H) 6 - 20 mg/dL  Creatinine 2.04 (H) 0.55 - 1.02 mg/dL BUN/Creatinine ratio 23 (H) 12 - 20 GFR est AA 29 (L) >60 ml/min/1.73m2 GFR est non-AA 24 (L) >60 ml/min/1.73m2 Calcium 8.7 8.5 - 10.1 mg/dL Bilirubin, total 0.6 0.2 - 1.0 mg/dL AST (SGOT) 17 15 - 37 U/L  
 ALT (SGPT) 54 12 - 78 U/L Alk. phosphatase 117 45 - 117 U/L Protein, total 6.1 (L) 6.4 - 8.2 g/dL Albumin 2.6 (L) 3.5 - 5.0 g/dL Globulin 3.5 2.0 - 4.0 g/dL A-G Ratio 0.7 (L) 1.1 - 2.2    
CBC WITH AUTOMATED DIFF Collection Time: 11/15/20  7:00 AM  
Result Value Ref Range WBC 5.3 4.4 - 11.3 K/uL  
 RBC 4.14 (L) 4.50 - 5.90 M/uL  
 HGB 11.7 (L) 13.5 - 17.5 g/dL HCT 37.0 36 - 46 % MCV 89.3 80 - 100 FL  
 MCH 28.1 (L) 31 - 34 PG  
 MCHC 31.5 31.0 - 36.0 g/dL  
 RDW 17.7 (H) 11.5 - 14.5 % PLATELET 679 K/uL MPV 7.6 6.5 - 11.5 FL  
 NRBC 10.0  WBC ABSOLUTE NRBC 0.01 K/uL NEUTROPHILS 79 (H) 42 - 75 % LYMPHOCYTES 7 (L) 20.5 - 51.1 % MONOCYTES 12 (H) 1.7 - 9.3 % EOSINOPHILS 2 0.9 - 2.9 % BASOPHILS 0 0.0 - 2.5 % ABS. NEUTROPHILS 4.2 1.8 - 7.7 K/UL  
 ABS. LYMPHOCYTES 0.4 (L) 1.0 - 4.8 K/UL  
 ABS. MONOCYTES 0.6 0.2 - 2.4 K/UL  
 ABS. EOSINOPHILS 0.1 0.0 - 0.7 K/UL  
 ABS. BASOPHILS 0.0 0.0 - 0.2 K/UL GLUCOSE, POC Collection Time: 11/15/20 10:15 AM  
Result Value Ref Range Glucose (POC) 160 (H) 65 - 100 mg/dL Performed by Leanna Dakins Imaging: Xr Chest [facility] Result Date: 11/14/2020 IMPRESSION: Worsening CHF. Assessment//Plan Problem List: 
Hospital Problems  Date Reviewed: 10/28/2020 Codes Class Noted POA  
 CHF (congestive heart failure) ([facility] [address] ICD-10-CM: I50.9 ICD-9-CM: 428.0  11/14/2020 Unknown Acute on chronic hypoxic respiratory failure 
- secondary to fluid overload possible from recent PRBC transfusion of 3 units.   
- on 2L NC prn but noted to have o2 saturation of 74% on RA and was placed on 4L NC  
- respiratory to titirate o2 as tolerated to maintain > 92%. - encourage incentive spirometry. Acute congestive heart failure unspecified 
- most likely fluid overload from recent 3 units of PRBC transfusion on outpatient done on 11/12 
- cxr showing vascular congestion / worsening chf 
-  Nephrology Dr. Gabriela Langford follows patient and normally is on 80mg oral lasix daily and recent added 40mg in pm.  
  - initially began Lasix 40 mg IV twice daily but IV access was lost and on 11/15 discussed with nephology and will increase lasix to 80mg oral BID  
  -Fluid restriction to 1200 mL daily 
  -Strict input and output 
  -Monitor daily electrolytes and weights 
  -Obtain 2D echocardiogram/cardiology evaluation 
  
Recent Dx of high grade / large B cell Lymphoma.  
  -Recent evaluation at [facility] with tonisilar mass causing dysphagia 
  -CT soft tissue neck from 10/28 reviewed. Patient was seen by ENT at [facility] and biopsy prelim results show high grade large B cell lympoma 
  - had initial follow up with oncologist Dr. Breanne Mulligan on 11/11 and ordered 3 units of prbc transfusion and patient daugther notes she has follow up this week to start chemotherapy once IR places port  
- poor prognosis, chemo is palliative.  
  
History of renal transplant 
   -On immunosuppressants with prograf and cellcept. - discussed with nephrology and will continue prograf at similar home dosing of 1mg bid and decrease cell cept to 500mg oral in AM and 250mg oral in PM 
- also continue current home dose of prednisone 7.5mg oral daily Diabetes 
- sliding scale with accuechecks ac and bedtime 
- was on lispro with meals but will hold for now. HTN 
- blood pressure elevated on admission at 184/90 and monitor q4hrs.   
- restart patient home meds of norvasc, hydralazine and metoprolol and Imdur new dose of lasix at 80mg oral BD 
 
CKD: 
- follows with nephrologist Dr. Gabriela Langford  
- continue current bicarbonate dosing. 
- fluid resriciton 
- nephrology consultation in AM  
 
Anemia: 
- stable hg at 11.2 on admission but is s/p 3 units of prbc but unknown what hg level was as previous hg at [facility] was 10.1 
- obtain iron levels  
- monitor closely  
- nephrology dc procrit until patient starts chemo Elevated D dimer  
- was 2.65 on admission but most likely elevated in setting of recent B cell Lymphoma diagnosis. - unable to do CTA secondary to CKD and VQ scan will be poor study on this patient. DVT prophylaxis - with recent PRBC transfusion will hold off on heparin dosing and place on SCD. Full Code Patient decsion maker is lisbet juarez. Spent 40 minutes evaluting and coordinating patient care of which >50% was spent coordinating and counseling.   
 
 
Electronically signed by Marianela Carson MD on 11/15/2020 at 12:42 PM

## 2020-11-15 NOTE — ROUTINE PROCESS
Pt alert and oriented x 3. Respirations even and unlabored. Occasional dry cough noted. Skin warm, dry and intact. Fsbs ordered, bs 160, 2 units of lispro given prior to lunch. Metoprolol held for hr of 59, Dr. Cydney Danielson aware. Strict I&o's and  1200 cc fluid restriction in progress. Oob with 1 assist.  Offers no c/o pain or discomfort.

## 2020-11-16 NOTE — PROGRESS NOTES
Problem: Falls - Risk of 
Goal: *Absence of Falls Description: Document Sofiya Gaytan Fall Risk and appropriate interventions in the flowsheet. 11/15/2020 2035 by Shelly Meyer Outcome: Progressing Towards Goal 
Note: Fall Risk Interventions: 
Mobility Interventions: Patient to call before getting OOB Medication Interventions: Patient to call before getting OOB, Teach patient to arise slowly Elimination Interventions: Call light in reach, Patient to call for help with toileting needs 11/15/2020 2035 by Shelly Meyer Outcome: Progressing Towards Goal 
Note: Fall Risk Interventions: 
Mobility Interventions: Patient to call before getting OOB Medication Interventions: Patient to call before getting OOB, Teach patient to arise slowly Elimination Interventions: Call light in reach, Patient to call for help with toileting needs Problem: Patient Education: Go to Patient Education Activity Goal: Patient/Family Education 11/15/2020 2035 by Shelly Meyer Outcome: Progressing Towards Goal 
11/15/2020 2035 by Shelly Meyer Outcome: Progressing Towards Goal 
  
Problem: Heart Failure: Day 1 Goal: Activity/Safety 11/15/2020 2035 by Shelly Meyer Outcome: Progressing Towards Goal 
11/15/2020 2035 by Shelly Meyer Outcome: Progressing Towards Goal 
Goal: Consults, if ordered 11/15/2020 2035 by Shelly Meyer Outcome: Progressing Towards Goal 
11/15/2020 2035 by Shelly Meyer Outcome: Progressing Towards Goal 
  
Problem: Pressure Injury - Risk of 
Goal: *Prevention of pressure injury Description: Document Bi Scale and appropriate interventions in the flowsheet. 11/15/2020 2035 by Shelly Meyer Outcome: Progressing Towards Goal 
Note: Pressure Injury Interventions: 
Sensory Interventions: Minimize linen layers, Keep linens dry and wrinkle-free Moisture Interventions: Minimize layers Activity Interventions: Increase time out of bed Mobility Interventions: Float heels, HOB 30 degrees or less Nutrition Interventions: Document food/fluid/supplement intake 11/15/2020 2035 by Allison Gonzalez Outcome: Progressing Towards Goal 
Note: Pressure Injury Interventions: 
Sensory Interventions: Minimize linen layers, Keep linens dry and wrinkle-free Moisture Interventions: Minimize layers Activity Interventions: Increase time out of bed Mobility Interventions: Float heels, HOB 30 degrees or less Nutrition Interventions: Document food/fluid/supplement intake Problem: Patient Education: Go to Patient Education Activity Goal: Patient/Family Education 11/15/2020 2035 by Allison Gonzalez Outcome: Progressing Towards Goal 
11/15/2020 2035 by Allison Gonzalez Outcome: Progressing Towards Goal

## 2020-11-16 NOTE — CONSULTS
CONSULTATION 
 
REASON FOR CONSULT:  Heart Failure  Exacerbation REQUESTING PROVIDER:  Dr. Ken CRUZ [facility] HM Team) CHIEF COMPLAINT:   
Chief Complaint Patient presents with  Shortness of Breath HISTORY OF PRESENT ILLNESS:  Michael Velarde is a 69year-old female with past medical history significant for CKD (wth previous renal transplant), DM, HTN, EMMANUEL, Chornic Respiratory failure requiring continuous supplemental oxygen, CAD s/p PCI formerly, and recent Dx of CA (has not started tx yet) who presented to ED with reports of worsening SOB and nonproductive cough. Workup revealed patient hypervolemic which causes decompensation and was referred for admission or further treatment. Per  team this am it was found out over weekend patient went to Oncologist in past week and has anemic and received 3 PRBC unit transfusions in OP setting and sent home. Given she was already on Lasix 80 in am and 40 in pm this excessive increase in 24 hour period likely caused her to decompensate. She is receiving PO Lasix currently with Nephrology guidance and she does not have IV access. She reports that she is feeling better and has no new complaints. Records from hospital admission course thus far reviewed. Telemetry Review: SR with occasional PVC, couplet PAST MEDICAL HISTORY:   
Past Medical History:  
Diagnosis Date  Chronic kidney disease  Diabetes ([address]  Heart failure ([address]  Hypertension  Kidney disease  Sleep apnea  Throat cancer ([address] HOME MEDICATIONS:   
Prior to Admission Medications Prescriptions Last Dose Informant Patient Reported? Taking? amLODIPine (NORVASC) 10 mg tablet Unknown at Unknown time  Yes No  
Sig: Take 10 mg by mouth daily. aspirin delayed-release 81 mg tablet   Yes No  
Sig: Take 81 mg by mouth daily. calcitRIOL (ROCALTROL) 0.25 mcg capsule Unknown at Unknown time  Yes No  
Sig: Take 0.25 mcg by mouth daily.   
cholecalciferol (VITAMIN D3) (2,000 UNITS /50 MCG) cap capsule Unknown at Unknown time  Yes No  
Sig: Take  by mouth two (2) times a day. gemfibroziL (LOPID) 600 mg tablet Unknown at Unknown time  Yes No  
Sig: Take 600 mg by mouth daily. hydrALAZINE (APRESOLINE) 50 mg tablet Unknown at Unknown time  Yes No  
Sig: Take 50 mg by mouth three (3) times daily. insulin lispro (HumaLOG U-100 Insulin) 100 unit/mL injection Unknown at Unknown time  Yes No  
Si Units by SubCUTAneous route Before breakfast, lunch, and dinner. isosorbide mononitrate ER (IMDUR) 60 mg CR tablet Unknown at Unknown time  Yes No  
Sig: Take 60 mg by mouth two (2) times a day. melatonin 3 mg tablet Unknown at Unknown time  Yes No  
Sig: Take 3 mg by mouth nightly. metoprolol tartrate (LOPRESSOR) 50 mg tablet Unknown at Unknown time  Yes No  
Sig: Take 50 mg by mouth two (2) times a day. mycophenolic acid (MYFORTIC) 812 mg delayed release tablet Unknown at Unknown time  Yes No  
Sig: Take 360 mg by mouth two (2) times a day. omeprazole (PRILOSEC) 40 mg capsule Unknown at Unknown time  Yes No  
Sig: Take 40 mg by mouth daily. predniSONE (DELTASONE) 2.5 mg tablet Unknown at Unknown time  Yes No  
Sig: Take 7.5 mg by mouth daily (with breakfast). sodium bicarbonate 650 mg tablet Unknown at Unknown time  Yes No  
Sig: Take 1,300 mg by mouth four (4) times daily. tacrolimus (PROGRAF) 1 mg capsule Unknown at Unknown time  No No  
Sig: Take 1 Cap by mouth every twelve (12) hours for 30 days. zolpidem (Ambien) 5 mg tablet Unknown at Unknown time  Yes No  
Sig: Take 5 mg by mouth nightly. Facility-Administered Medications: None ALLERGIES:   
Allergies Allergen Reactions  Benadryl [Diphenhydramine Hcl] Other (comments)  Fentanyl Other (comments)  Morphine Unknown (comments)  Versed [Midazolam] Other (comments) FAMILY HISTORY:  History reviewed. No pertinent family history.  
 
 
SOCIAL HISTORY:   
Tobacco: denies Drugs: denies ETOH: denies REVIEW OF SYSTEMS:  Complete review of systems performed, pertinents noted above, all other systems are negative. Patient Vitals for the past 24 hrs: 
 Temp Pulse Resp BP SpO2  
11/16/20 0811 (!) 96.7 °F (35.9 °C) 79 20 (!) 154/74 100 % 11/16/20 0400 98 °F (36.7 °C) 76 20 (!) 147/71 94 % 11/16/20 0032 97.9 °F (36.6 °C) 71 20 138/70 95 % 11/16/20 0000  67     
11/15/20 2024 98 °F (36.7 °C) 63 20 (!) 142/70 95 % 11/15/20 2000  63     
11/15/20 1546 97.9 °F (36.6 °C) 66 18 139/67 98 % 11/15/20 1200  72     
11/15/20 1136 96.8 °F (36 °C) 72 18 (!) 156/73 90 % PHYSICAL EXAMINATION:   
General: Well nourished generally weak chronically ill appearing female lying in bed, NAD, A&O HEENT: Normocephalic, PERRL, no drainage Neck: Supple, Trachea midline, No JVD RESP: Basilar crackles bilaterally, symmetrical chest movement. No SOB or distress. On 4L o2 via NC. Cardiovascular: RRR no MRG 
PVS: No rubor, cyanosis, trace pitting BLE edema, Radial, DP, PT pulses equal bilaterally ABD: obese , soft NT, Normoactive BS Derm: Warm/Dry/Intact with no lesions, normal turgor Neuro: A&O PPTS, cranial nerves II- XII grossly intact via interaction with patient. No focal deficits PSYCH: No anxiety or agitation, flat affect Recent labs results and imaging reviewed. Recent Results (from the past 24 hour(s)) GLUCOSE, POC Collection Time: 11/15/20 10:15 AM  
Result Value Ref Range Glucose (POC) 160 (H) 65 - 100 mg/dL Performed by Kiran Manuel GLUCOSE, POC Collection Time: 11/15/20  3:42 PM  
Result Value Ref Range Glucose (POC) 208 (H) 65 - 100 mg/dL Performed by Kiran Manuel GLUCOSE, POC Collection Time: 11/15/20  9:31 PM  
Result Value Ref Range Glucose (POC) 198 (H) 65 - 100 mg/dL Performed by MIKE LOCK, POC Collection Time: 11/16/20  8:08 AM  
Result Value Ref Range  Glucose (POC) 168 (H) 65 - 100 mg/dL Performed by [facility] METABOLIC PANEL, BASIC Collection Time: 11/16/20  8:55 AM  
Result Value Ref Range Sodium 137 136 - 145 mmol/L Potassium 4.1 3.5 - 5.1 mmol/L Chloride 98 97 - 108 mmol/L  
 CO2 29 21 - 32 mmol/L Anion gap 10 5 - 15 mmol/L Glucose 220 (H) 65 - 100 mg/dL BUN 48 (H) 6 - 20 mg/dL Creatinine 2.10 (H) 0.55 - 1.02 mg/dL BUN/Creatinine ratio 23 (H) 12 - 20 GFR est AA 28 (L) >60 ml/min/1.73m2 GFR est non-AA 23 (L) >60 ml/min/1.73m2 Calcium 10.0 8.5 - 10.1 mg/dL CBC WITH AUTOMATED DIFF Collection Time: 11/16/20  8:55 AM  
Result Value Ref Range WBC 5.8 4.4 - 11.3 K/uL  
 RBC 4.61 4.50 - 5.90 M/uL  
 HGB 13.0 (L) 13.5 - 17.5 g/dL HCT 40.8 36 - 46 % MCV 88.6 80 - 100 FL  
 MCH 28.3 (L) 31 - 34 PG  
 MCHC 31.9 31.0 - 36.0 g/dL  
 RDW 17.4 (H) 11.5 - 14.5 % PLATELET 846 K/uL MPV 7.3 6.5 - 11.5 FL  
 NRBC 20.0  WBC ABSOLUTE NRBC 0.01 K/uL NEUTROPHILS 78 (H) 42 - 75 % LYMPHOCYTES 8 (L) 20.5 - 51.1 % MONOCYTES 12 (H) 1.7 - 9.3 % EOSINOPHILS 1 0.9 - 2.9 % BASOPHILS 1 0.0 - 2.5 % ABS. NEUTROPHILS 4.5 1.8 - 7.7 K/UL  
 ABS. LYMPHOCYTES 0.5 (L) 1.0 - 4.8 K/UL  
 ABS. MONOCYTES 0.7 0.2 - 2.4 K/UL  
 ABS. EOSINOPHILS 0.0 0.0 - 0.7 K/UL  
 ABS. BASOPHILS 0.0 0.0 - 0.2 K/UL  
 
 
XR Results (maximum last 3): Results from Hospital Encounter encounter on 11/14/20 XR CHEST PORT Impression IMPRESSION: Worsening CHF. Results from [facility] Encounter encounter on 10/26/20 XR CHEST PA LAT Impression IMPRESSION: Cardiomegaly with vascular engorgement and Kerley B line consistent 
with chronic congestive heart failure. No effusion. Prior cholecystectomy and 
right arm surgery. Results from Hospital Encounter encounter on 10/08/20 XR CHEST SNGL V Impression Impression: 
 
Stable mild cardiomegaly. No acute pulmonary process. CT Results (maximum last 3):  
Results from [place] encounter on 10/26/20 CT NECK SOFT TISSUE WO CONT Impression IMPRESSION: Large right tonsillar soft tissue mass causing near complete 
effacement of the nasopharyngeal airway on the coronal and sagittal reformatted 
images. The oropharyngeal airway is patent. Significant cervical adenopathy. Edema of the soft tissues of the head and neck. Recommend ENT evaluation with a rightward biopsy. While this could represent a 
huge candida plaque, neoplasm is in the differential diagnosis. Please see full 
report. Results from Hospital Encounter encounter on 10/08/20 CT HEAD WO CONT Impression IMPRESSION:  
1. There is a mild degree of age-appropriate cerebral cortical volume loss and 
mild microvascular changes within the central white matter. These findings have 
remained stable. 2.  This examination is negative for acute intracranial pathology such as a 
hypertensive hemorrhage or an extra-axial collection. 3.  This examination is negative for intracranial masses or mass effect. Current Facility-Administered Medications:  
  glucose chewable tablet 16 g, 4 Tab, Oral, PRN, Alfonzo Chino MD 
  dextrose (D50W) injection syrg 12.5-25 g, 25-50 mL, IntraVENous, PRN, Alfonzo Chino MD 
  glucagon (GLUCAGEN) injection 1 mg, 1 mg, IntraMUSCular, PRN, Alfonzo Chino MD 
  insulin lispro (HUMALOG) injection, , SubCUTAneous, AC&HS, Alfonzo Chino MD, 2 Units at 11/16/20 0837 
  zolpidem (AMBIEN) tablet 5 mg, 5 mg, Oral, QHS, Alfonzo Chino MD, 5 mg at 11/15/20 2117 
  metoprolol tartrate (LOPRESSOR) tablet 25 mg, 25 mg, Oral, BID, Alfonzo Chino MD, 25 mg at 11/16/20 1713   furosemide (LASIX) tablet 80 mg, 80 mg, Oral, ACB&D, Alfonzo Chino MD, 80 mg at 11/16/20 0819 
  mycophenolate mofetil (CELLCEPT) capsule 500 mg, 500 mg, Oral, ACBMatti Goutham R, MD, 500 mg at 11/16/20 0819 
  mycophenolate mofetil (CELLCEPT) capsule 250 mg, 250 mg, Oral, QHS, Matti Lucas Brandt MD, 250 mg at 11/15/20 2118   sodium chloride (NS) flush 5-40 mL, 5-40 mL, IntraVENous, Q8H, Lisbeth Ashford MD, Stopped at 11/14/20 2200 
  sodium chloride (NS) flush 5-40 mL, 5-40 mL, IntraVENous, PRN, Lisbeth Ashford MD 
  acetaminophen (TYLENOL) tablet 650 mg, 650 mg, Oral, Q6H PRN, 650 mg at 11/15/20 0428 **OR** acetaminophen (TYLENOL) suppository 650 mg, 650 mg, Rectal, Q6H PRN, Lisbeth Ashford MD 
  polyethylene glycol (MIRALAX) packet 17 g, 17 g, Oral, DAILY PRN, Lisbeth Ashford MD 
  ondansetron [facility]) injection 4 mg, 4 mg, IntraVENous, Q6H PRN, Lisbeth Ashford MD 
  amLODIPine (NORVASC) tablet 10 mg, 10 mg, Oral, DAILY, Lisbeth Ashford MD, 10 mg at 11/16/20 4052 
  aspirin delayed-release tablet 81 mg, 81 mg, Oral, DAILY, Lisbeth Ashford MD, 81 mg at 11/16/20 9489   calcitRIOL (ROCALTROL) capsule 0.25 mcg, 0.25 mcg, Oral, DAILY, Lisbeth Ashford MD, 0.25 mcg at 11/16/20 0820 
  gemfibroziL (LOPID) tablet 600 mg, 600 mg, Oral, DAILY, Lisbeth Ashford MD, 600 mg at 11/16/20 8614   hydrALAZINE (APRESOLINE) tablet 50 mg, 50 mg, Oral, TID, Lisbeth Ashford MD, 50 mg at 11/16/20 5142 
  isosorbide mononitrate ER (IMDUR) tablet 60 mg, 60 mg, Oral, 7am, Lisbeth Ashford MD, 60 mg at 11/16/20 0819 
  pantoprazole (PROTONIX) tablet 40 mg, 40 mg, Oral, ACB, Lisbeth Ashford MD, 40 mg at 11/16/20 2613   predniSONE (DELTASONE) tablet 7.5 mg, 7.5 mg, Oral, DAILY WITH BREAKFAST, Lisbeth Ashford MD, 7.5 mg at 11/16/20 1406 
  sodium bicarbonate tablet 1,300 mg, 1,300 mg, Oral, QID, Lisbeth Ashford MD, 1,300 mg at 11/16/20 3511   tacrolimus (PROGRAF) capsule 1 mg, 1 mg, Oral, Q12H, Lisbeth Ashford MD, 1 mg at 11/16/20 8105 Case discussed with collaborating physician Dr. Ruba Aburto and our impression and recommendations are as follows: 1. HTN - BP above goal. Continue PO diuresis.   Monitor closely and can make further adjustments under guidance from Nephrology. 2. Acute on Chronic HFpEF - Nephrology guiding Diuresis, otherwise keep BP under control. Cause of exacerbation is clear. Can follow-up with established Cardiologist 2-3 weeks after discharge given Nephrology manages her medications for the most part. 3. Elevated Dimer - ?related to CA. NO RV strain on Echo. Further per  team.  
 
 
Thank you for involving us in the care of this patient. Please do not hesitate to call if additional questions arise. If after hours please call [phone]

## 2020-11-16 NOTE — PROGRESS NOTES
Problem: Falls - Risk of 
Goal: *Absence of Falls Description: Document Littlelakesha Taylor Fall Risk and appropriate interventions in the flowsheet. Outcome: Progressing Towards Goal 
Note: Fall Risk Interventions: 
Mobility Interventions: Patient to call before getting OOB Medication Interventions: Patient to call before getting OOB Elimination Interventions: Call light in reach, Patient to call for help with toileting needs Problem: Patient Education: Go to Patient Education Activity Goal: Patient/Family Education Outcome: Progressing Towards Goal 
  
Problem: Heart Failure: Day 1 Goal: Activity/Safety Outcome: Progressing Towards Goal 
Goal: Consults, if ordered Outcome: Progressing Towards Goal 
  
Problem: Pressure Injury - Risk of 
Goal: *Prevention of pressure injury Description: Document Bi Scale and appropriate interventions in the flowsheet. Outcome: Progressing Towards Goal 
Note: Pressure Injury Interventions: 
Sensory Interventions: Minimize linen layers Moisture Interventions: Minimize layers Activity Interventions: Increase time out of bed Mobility Interventions: Float heels Nutrition Interventions: Document food/fluid/supplement intake

## 2020-11-16 NOTE — PROGRESS NOTES
Progress Note Date:11/16/2020       [facility]:891/88 Patient Zaira Fraga     Date of Birth:[birth year]     Age:70 y.o. Claritza Velarde is a 69 y.o. female with recent diagnosis of throat cancer not on chemotherapy or radiation. Presents to the ED with reports of worsening shortness of breath and nonproductive cough. No fevers at home. Notes bilateral leg swelling. Shortness of breath is worse with exertion and improves with rest.  Chest x-ray in the ED showed increased vascular congestion. Oxygen saturation on room air is 74% and improved to 94% on 4 L nasal oxygen. Uses oxygen as needed at home. Patient recently at 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 . it was noted that blood level was low so patient was arranged from blood transfusion of 3 units of  RBC on 11/12. Patient was recentlly increased by nephrologist from lasix 80mg daily to 80mg in AM and 40mg in pm. Patient presents with sudden Acute on chronic hypoxic respiratory failure secondary to fluid overload and was referred for admission. Patient seen and evaluate . Awake and alert and oriented. nasal congestion noted. Improved o2 and decrease o2 NC to 4L. Renal fxn stable. Does have complaint of throat hurting. Does have lesion and will attempt to give oral spray . Review of Systems Constitutional: Positive for fatigue. HENT: Negative. Cardiovascular: Negative. Gastrointestinal: Negative. Endocrine: Negative. Genitourinary: Negative. Musculoskeletal: Negative. Allergic/Immunologic: Negative. Neurological: Negative. Hematological: Negative. Psychiatric/Behavioral: Negative. Objective Vitals Last 24 Hours: 
Patient Vitals for the past 24 hrs: 
 Temp Pulse Resp BP SpO2  
11/16/20 1200  79     
11/16/20 0955     100 % 11/16/20 0950   16  100 % 11/16/20 0900  79     
11/16/20 0811 (!) 96.7 °F (35.9 °C) 79 20 (!) 154/74 100 % 11/16/20 0800  79     
11/16/20 0400 98 °F (36.7 °C) 76 20 (!) 147/71 94 % 11/16/20 0032 97.9 °F (36.6 °C) 71 20 138/70 95 % 11/16/20 0000  67     
11/15/20 2024 98 °F (36.7 °C) 63 20 (!) 142/70 95 % 11/15/20 2000  63     
11/15/20 1546 97.9 °F (36.6 °C) 66 18 139/67 98 % I/O (24Hr): Intake/Output Summary (Last 24 hours) at 11/16/2020 1439 Last data filed at 11/16/2020 1178 Gross per 24 hour Intake 300 ml Output 600 ml Net -300 ml Physical Exam: 
General: Alert, cooperative, no distress, appears stated age. Head:  Normocephalic, without obvious abnormality, atraumatic. Eyes:  Conjunctivae/corneas clear. Pupils equal, round, reactive to light. Extraocular movements intact. Lungs:  Clear to auscultation bilaterally but dmminished. no wheeze, rales, crackles, rhonchi Chest wall: No tenderness or deformity. Heart:  Regular rate and rhythm, S1, S2 normal, no murmur, click, rub or gallop. Abdomen:  Soft, non-tender. Bowel sounds normal. No masses,  No organomegaly. Extremities: Extremities normal, atraumatic, no cyanosis or edema. Pulses: 2+ and symmetric all extremities. Skin: Skin color, texture, turgor normal. No rashes or lesions Neurologic: Awake, Alert, oriented. No obvious gross sensory or motor deficits Medications Current Facility-Administered Medications Medication Dose Route Frequency  phenol throat spray (CHLORASEPTIC) 1 Spray  1 Spray Oral TID PRN  
 fluticasone propionate (FLONASE) 50 mcg/actuation nasal spray 2 Spray  2 Spray Both Nostrils DAILY  glucose chewable tablet 16 g  4 Tab Oral PRN  
 dextrose (D50W) injection syrg 12.5-25 g  25-50 mL IntraVENous PRN  
 glucagon (GLUCAGEN) injection 1 mg  1 mg IntraMUSCular PRN  
 insulin lispro (HUMALOG) injection   SubCUTAneous AC&HS  
 zolpidem (AMBIEN) tablet 5 mg  5 mg Oral QHS  metoprolol tartrate (LOPRESSOR) tablet 25 mg  25 mg Oral BID  furosemide (LASIX) tablet 80 mg  80 mg Oral ACB&D  
 mycophenolate mofetil (CELLCEPT) capsule 500 mg  500 mg Oral ACB  mycophenolate mofetil (CELLCEPT) capsule 250 mg  250 mg Oral QHS  sodium chloride (NS) flush 5-40 mL  5-40 mL IntraVENous Q8H  
 sodium chloride (NS) flush 5-40 mL  5-40 mL IntraVENous PRN  
 acetaminophen (TYLENOL) tablet 650 mg  650 mg Oral Q6H PRN Or  
 acetaminophen (TYLENOL) suppository 650 mg  650 mg Rectal Q6H PRN  polyethylene glycol (MIRALAX) packet 17 g  17 g Oral DAILY PRN  
 ondansetron (ZOFRAN) injection 4 mg  4 mg IntraVENous Q6H PRN  
 amLODIPine (NORVASC) tablet 10 mg  10 mg Oral DAILY  aspirin delayed-release tablet 81 mg  81 mg Oral DAILY  calcitRIOL (ROCALTROL) capsule 0.25 mcg  0.25 mcg Oral DAILY  gemfibroziL (LOPID) tablet 600 mg  600 mg Oral DAILY  hydrALAZINE (APRESOLINE) tablet 50 mg  50 mg Oral TID  isosorbide mononitrate ER (IMDUR) tablet 60 mg  60 mg Oral 7am  
 pantoprazole (PROTONIX) tablet 40 mg  40 mg Oral ACB  predniSONE (DELTASONE) tablet 7.5 mg  7.5 mg Oral DAILY WITH BREAKFAST  sodium bicarbonate tablet 1,300 mg  1,300 mg Oral QID  tacrolimus (PROGRAF) capsule 1 mg  1 mg Oral Q12H Allergies Benadryl [diphenhydramine hcl]; Fentanyl; Morphine; and Versed [midazolam] Labs/Imaging/Diagnostics Labs: 
Recent Results (from the past 48 hour(s)) GLUCOSE, POC Collection Time: 11/14/20  4:31 PM  
Result Value Ref Range Glucose (POC) 227 (H) 65 - 100 mg/dL Performed by Deja Fall   
TROPONIN I Collection Time: 11/14/20  6:50 PM  
Result Value Ref Range Troponin-I, Qt. <0.05 <0.05 ng/mL METABOLIC PANEL, COMPREHENSIVE Collection Time: 11/15/20  7:00 AM  
Result Value Ref Range Sodium 137 136 - 145 mmol/L Potassium 4.3 3.5 - 5.1 mmol/L Chloride 101 97 - 108 mmol/L  
 CO2 27 21 - 32 mmol/L Anion gap 9 5 - 15 mmol/L  Glucose 167 (H) 65 - 100 mg/dL BUN 46 (H) 6 - 20 mg/dL Creatinine 2.04 (H) 0.55 - 1.02 mg/dL BUN/Creatinine ratio 23 (H) 12 - 20 GFR est AA 29 (L) >60 ml/min/1.73m2 GFR est non-AA 24 (L) >60 ml/min/1.73m2 Calcium 8.7 8.5 - 10.1 mg/dL Bilirubin, total 0.6 0.2 - 1.0 mg/dL AST (SGOT) 17 15 - 37 U/L  
 ALT (SGPT) 54 12 - 78 U/L Alk. phosphatase 117 45 - 117 U/L Protein, total 6.1 (L) 6.4 - 8.2 g/dL Albumin 2.6 (L) 3.5 - 5.0 g/dL Globulin 3.5 2.0 - 4.0 g/dL A-G Ratio 0.7 (L) 1.1 - 2.2    
CBC WITH AUTOMATED DIFF Collection Time: 11/15/20  7:00 AM  
Result Value Ref Range WBC 5.3 4.4 - 11.3 K/uL  
 RBC 4.14 (L) 4.50 - 5.90 M/uL  
 HGB 11.7 (L) 13.5 - 17.5 g/dL HCT 37.0 36 - 46 % MCV 89.3 80 - 100 FL  
 MCH 28.1 (L) 31 - 34 PG  
 MCHC 31.5 31.0 - 36.0 g/dL  
 RDW 17.7 (H) 11.5 - 14.5 % PLATELET 306 K/uL MPV 7.6 6.5 - 11.5 FL  
 NRBC 10.0  WBC ABSOLUTE NRBC 0.01 K/uL NEUTROPHILS 79 (H) 42 - 75 % LYMPHOCYTES 7 (L) 20.5 - 51.1 % MONOCYTES 12 (H) 1.7 - 9.3 % EOSINOPHILS 2 0.9 - 2.9 % BASOPHILS 0 0.0 - 2.5 % ABS. NEUTROPHILS 4.2 1.8 - 7.7 K/UL  
 ABS. LYMPHOCYTES 0.4 (L) 1.0 - 4.8 K/UL  
 ABS. MONOCYTES 0.6 0.2 - 2.4 K/UL  
 ABS. EOSINOPHILS 0.1 0.0 - 0.7 K/UL  
 ABS. BASOPHILS 0.0 0.0 - 0.2 K/UL  
IRON PROFILE Collection Time: 11/15/20  7:12 AM  
Result Value Ref Range Iron 28 (L) 35 - 150 ug/dL TIBC 206 (L) 250 - 450 ug/dL Iron % saturation 14 (L) 20 - 50 % GLUCOSE, POC Collection Time: 11/15/20 10:15 AM  
Result Value Ref Range Glucose (POC) 160 (H) 65 - 100 mg/dL Performed by Gavin Watson GLUCOSE, POC Collection Time: 11/15/20  3:42 PM  
Result Value Ref Range Glucose (POC) 208 (H) 65 - 100 mg/dL Performed by Gavin Watson GLUCOSE, POC Collection Time: 11/15/20  9:31 PM  
Result Value Ref Range Glucose (POC) 198 (H) 65 - 100 mg/dL Performed by MIKE ELIZABETH   
GLUCOSE, POC  Collection Time: 11/16/20 8:08 AM  
Result Value Ref Range Glucose (POC) 168 (H) 65 - 100 mg/dL Performed by [facility] METABOLIC PANEL, BASIC Collection Time: 11/16/20  8:55 AM  
Result Value Ref Range Sodium 137 136 - 145 mmol/L Potassium 4.1 3.5 - 5.1 mmol/L Chloride 98 97 - 108 mmol/L  
 CO2 29 21 - 32 mmol/L Anion gap 10 5 - 15 mmol/L Glucose 220 (H) 65 - 100 mg/dL BUN 48 (H) 6 - 20 mg/dL Creatinine 2.10 (H) 0.55 - 1.02 mg/dL BUN/Creatinine ratio 23 (H) 12 - 20 GFR est AA 28 (L) >60 ml/min/1.73m2 GFR est non-AA 23 (L) >60 ml/min/1.73m2 Calcium 10.0 8.5 - 10.1 mg/dL CBC WITH AUTOMATED DIFF Collection Time: 11/16/20  8:55 AM  
Result Value Ref Range WBC 5.8 4.4 - 11.3 K/uL  
 RBC 4.61 4.50 - 5.90 M/uL  
 HGB 13.0 (L) 13.5 - 17.5 g/dL HCT 40.8 36 - 46 % MCV 88.6 80 - 100 FL  
 MCH 28.3 (L) 31 - 34 PG  
 MCHC 31.9 31.0 - 36.0 g/dL  
 RDW 17.4 (H) 11.5 - 14.5 % PLATELET 206 K/uL MPV 7.3 6.5 - 11.5 FL  
 NRBC 20.0  WBC ABSOLUTE NRBC 0.01 K/uL NEUTROPHILS 78 (H) 42 - 75 % LYMPHOCYTES 8 (L) 20.5 - 51.1 % MONOCYTES 12 (H) 1.7 - 9.3 % EOSINOPHILS 1 0.9 - 2.9 % BASOPHILS 1 0.0 - 2.5 % ABS. NEUTROPHILS 4.5 1.8 - 7.7 K/UL  
 ABS. LYMPHOCYTES 0.5 (L) 1.0 - 4.8 K/UL  
 ABS. MONOCYTES 0.7 0.2 - 2.4 K/UL  
 ABS. EOSINOPHILS 0.0 0.0 - 0.7 K/UL  
 ABS. BASOPHILS 0.0 0.0 - 0.2 K/UL  
ECHO ADULT COMPLETE Collection Time: 11/16/20 10:00 AM  
Result Value Ref Range LV ED Vol A2C 72.27 mL IVSd 1.42 (A) 0.6 - 0.9 cm LVIDd 4.05 3.9 - 5.3 cm LVIDs 2.48 cm  
 LVOT d 2.09 cm  
 LVPWd 1.30 (A) 0.6 - 0.9 cm  
 LVOT SV 51.3 mL  
 LVOT SV 51.3 mL  
 BP EF 56.9 55 - 100 percent BP EF 56.9 55 - 100 percent LV Ejection Fraction MOD 2C 68 percent LV ED Vol BP 48.99 (A) 56 - 104 mL  
 LV ES Vol A2C 16.63 mL  
 LV ES Vol BP 21.12 19 - 49 mL  
 LVOT Peak Gradient 6.55 mmHg Left Ventricular Outflow Tract Mean Gradient 2.83 mmHg  LVOT Peak Velocity 127.97 cm/s LVOT VTI 29.55 cm  
 LA Volume 52.34 22 - 52 mL  
 LA Vol 2C 49.09 22 - 52 mL  
 LA Vol 4C 50.65 22 - 52 mL Aortic Valve Area by Continuity of Peak Velocity 2.22 cm2 Aortic Valve Area by Continuity of VTI 2.16 cm2 AoV PG 15.45 mmHg Aortic Valve Systolic Mean Gradient 1.55 mmHg Aortic Valve Systolic Peak Velocity 627.04 cm/s Aortic valve mean velocity 143.67 cm/s AoV VTI 46.74 cm  
 MV A Robin 99.75 cm/s Mitral Valve E Wave Deceleration Time 222.50 ms MV E Robin 91.82 cm/s  
 MV E/A 0.92 Mitral Valve Pressure Half-time 64.52 ms  
 MVA (PHT) 3.41 cm2  
 MVA (PHT) 3.41 cm2 Triscuspid Valve Regurgitation Peak Gradient 39.01 mmHg  
 TR Max Velocity 312.29 cm/s Ao Root D 3.13 cm  
 LV Mass .5 67 - 162 g RVIDd 3.1 cm Right Atrial Area 4C 15.0 cm2 RVSP 47.0 mmHg Est. RA Pressure 8.0 mmHg GLUCOSE, POC Collection Time: 11/16/20 11:44 AM  
Result Value Ref Range Glucose (POC) 121 (H) 65 - 100 mg/dL Performed by Mamie Hudson Imaging: Xr Chest [facility] Result Date: 11/14/2020 IMPRESSION: Worsening CHF. Assessment//Plan Problem List: 
Hospital Problems  Date Reviewed: 10/28/2020 Codes Class Noted POA  
 [facility] ICD-10-CM: I50.9 ICD-9-CM: 428.0  11/14/2020 Unknown Acute on chronic hypoxic respiratory failure 
- secondary to fluid overload possible from recent PRBC transfusion of 3 units. - on 2L NC prn but noted to have o2 saturation of 74% on RA and was placed on 4L NC  
- respiratory to titirate o2 as tolerated to maintain > 92%. - encourage incentive spirometry. - 11/16: improved slightly and down to 4L nc, noted nasal congestion so flonas started Acute congestive heart failure unspecified 
- most likely fluid overload from recent 3 units of PRBC transfusion on outpatient done on 11/12 
- cxr showing vascular congestion / worsening chf 
-  Nephrology Dr. Lee Police follows patient and normally is on 80mg oral lasix daily and recent added 40mg in pm.  
  - initially began Lasix 40 mg IV twice daily but IV access was lost and on 11/15 discussed with nephology and will increase lasix to 80mg oral BID  
  -Fluid restriction to 1200 mL daily 
  -Strict input and output 
  -Monitor daily electrolytes and weights 
  -prelim 2D echo shows preserved EF. Cardiology consult appreciated.  
  
Recent Dx of high grade / large B cell Lymphoma.  
  -Recent evaluation at [facility] with tonisilar mass causing dysphagia 
  -CT soft tissue neck from 10/28 reviewed. Patient was seen by ENT at [facility] and biopsy prelim results show high grade large B cell lympoma 
  - had initial follow up with oncologist Dr. Laura Montiel on 11/11 and ordered 3 units of prbc transfusion and patient daugther notes she has follow up this week to start chemotherapy once IR places port  
- poor prognosis, chemo is palliative.  
  
History of renal transplant 
   -On immunosuppressants with prograf and cellcept. - discussed with nephrology and will continue prograf at similar home dosing of 1mg bid and decrease cell cept to 500mg oral in AM and 250mg oral in PM 
- also continue current home dose of prednisone 7.5mg oral daily Diabetes 
- sliding scale with accuechecks ac and bedtime 
- was on lispro with meals but will hold for now. HTN 
- blood pressure elevated on admission at 184/90 and monitor q4hrs. - restart patient home meds of norvasc, hydralazine and metoprolol and Imdur new dose of lasix at 80mg oral BD 
 
CKD: 
- follows with nephrologist Dr. Brennan Chavis  
- continue current bicarbonate dosing. 
- fluid resriciton 
- nephrology consultation pending Anemia: 
- stable hg at 11.2 on admission but is s/p 3 units of prbc but unknown what hg level was as previous hg at [facility] was 10.1 and repeat stable on 11/16 at 13.1 
- obtain iron levels pending   
- monitor closely - nephrology dc procrit until patient starts chemo Elevated D dimer  
- was 2.65 on admission but most likely elevated in setting of recent B cell Lymphoma diagnosis. - unable to do CTA secondary to CKD and VQ scan will be poor study on this patient. DVT prophylaxis - with recent PRBC transfusion will hold off on heparin dosing and place on SCD. Full Code Patient decsion maker is lisbet juarez. Spent 30 minutes evaluting and coordinating patient care of which >50% was spent coordinating and counseling.   
 
 
Electronically signed by Canelo Lr MD on 11/16/2020 at 12:42 PM

## 2020-11-17 NOTE — PROGRESS NOTES
Spiritual Care Assessment/Progress Note [address] 
 
 
NAME: Monroe Almazan      MRN: [id number] AGE: 69 y.o. SEX: female Oriental orthodox Affiliation: Caterina Avitia Language: English  
 
11/17/2020     Total Time (in minutes): 7 Spiritual Assessment begun in [address] through conversation with: 
  
    [x]Patient        [] Family    [] Friend(s) Reason for Consult: Initial/Spiritual assessment, patient floor Spiritual beliefs: (Please include comment if needed) 
   [] Identifies with a monica tradition:     
   [] Supported by a monica community:        
   [] Claims no spiritual orientation:       
   [] Seeking spiritual identity:            
   [] Adheres to an individual form of spirituality:       
   [x] Not able to assess:                   
 
    
Identified resources for coping:  
   [] Prayer                           
   [] Music                  [] Guided Imagery 
   [] Family/friends                 [] Pet visits [] Devotional reading                         [x] Unknown 
   [] Other:                                         
 
 
Interventions offered during this visit: (See comments for more details) Patient Interventions: Affirmation of emotions/emotional suffering, Coping skills reviewed/reinforced, Initial/Spiritual assessment, patient floor, Normalization of emotional/spiritual concerns, Prayer (assurance of) Plan of Care: 
 
 [] Support spiritual and/or cultural needs  
 [] Support AMD and/or advance care planning process    
 [] Support grieving process 
 [] Coordinate Rites and/or Rituals  
 [] Coordination with community clergy  [] No spiritual needs identified at this time 
 [] Detailed Plan of Care below (See Comments)  [] Make referral to Music Therapy 
[] Make referral to Pet Therapy    
[] Make referral to Addiction services 
[] Make referral to [facility] 
[] Make referral to Spiritual Care Partner 
[] No future visits requested       
[x] Follow up visits as needed Comments:  Visited Ms. Tenorio in 2nd fl. Acute care [address]. A staff seemed to suggest it might benefit the patient if spiritual care was provided. Only patient was present during the visit. Patient welcomed the visit but did not engage too much except to say that she is doing alright. She seemed to be in some discomfort as indicated by her facial expression. I attempted to facilitate storytelling and review her coping strategies. I sought and received her permission to keep her in prayer. I also advised of  availability. Chaplains will follow as able and/or needed. STEPHANY [email or web address].  can be reached by calling the  at [facility] 
[phone]

## 2020-11-17 NOTE — PROGRESS NOTES
Problem: Falls - Risk of 
Goal: *Absence of Falls Description: Document Wm Michelle Fall Risk and appropriate interventions in the flowsheet. Outcome: Progressing Towards Goal 
Note: Fall Risk Interventions: 
Mobility Interventions: Patient to call before getting OOB Medication Interventions: Patient to call before getting OOB Elimination Interventions: Call light in reach, Patient to call for help with toileting needs Problem: Patient Education: Go to Patient Education Activity Goal: Patient/Family Education Outcome: Progressing Towards Goal 
  
Problem: Heart Failure: Day 1 Goal: Activity/Safety Outcome: Progressing Towards Goal 
Goal: Consults, if ordered Outcome: Progressing Towards Goal 
  
Problem: Pressure Injury - Risk of 
Goal: *Prevention of pressure injury Description: Document Bi Scale and appropriate interventions in the flowsheet. Outcome: Progressing Towards Goal 
Note: Pressure Injury Interventions: 
Sensory Interventions: Minimize linen layers Moisture Interventions: Minimize layers Activity Interventions: Increase time out of bed Mobility Interventions: Float heels Nutrition Interventions: Document food/fluid/supplement intake Problem: Patient Education: Go to Patient Education Activity Goal: Patient/Family Education Outcome: Progressing Towards Goal

## 2020-11-17 NOTE — PROGRESS NOTES
Discharge instructions with patients daughter Monique Banda. Aware of new meds and follow up appointments.

## 2020-11-17 NOTE — PROGRESS NOTES
Readmission Assessment Number of days since last admission?: 8-30 days Previous disposition: Home Alone Who is being interviewed?: Patient What was the patient's/caregiver's perception as to why they think they needed to return back to the hospital?: Other (Comment)(Patient recently at [facility] and was diagnosed prelim with lympoma and had f/u with oncologist Dr Shaunna Flynn. it was noted that blood level was low so patient got blood transfusion of 3 units of  RBC on 11/12. fluid overload after transfusion) Did you visit your Primary Care Physician after you left the hospital, before you returned this time?: Yes Did you see a specialist, such as Cardiac, Pulmonary, Orthopedic Physician, etc. after you left the hospital?: Yes Who advised the patient to return to the hospital?: Physician Does the patient report anything that got in the way of taking their medications?: No 
In our efforts to provide the best possible care to you and others like you, can you think of anything that we could have done to help you after you left the hospital the first time, so that you might not have needed to return so soon?: Additional Community resources available for illness support(will set up with home health to follow for disease management)

## 2020-11-17 NOTE — DISCHARGE SUMMARY
Physician Discharge Summary Patient: Vega Robbins MRN: [id number] YOB: 1950  Age: 69 y.o. Sex: female PCP: eBnnie Bueno MD 
 
Allergies: Benadryl [diphenhydramine hcl]; Fentanyl; Morphine; and Versed [midazolam] Admit date: 11/14/2020 Admitting Provider: Sushma Lua MD 
 
Discharge date: 11/17/2020 Discharging Provider: Shawn Mackay MD 
 
* Admission Diagnoses:  
CHF (congestive heart failure) ([facility] [I50.9] * Discharge Diagnoses:   
Hospital Problems as of 11/17/2020 Date Reviewed: 10/28/2020 Codes Class Noted - Resolved POA  
 CHF (congestive heart failure) ([facility] ICD-10-CM: I50.9 ICD-9-CM: 428.0  11/14/2020 - Present Unknown * Hospital Course:  
Hayley Mckay a 69 y. o. female with recent diagnosis of throat cancer not on chemotherapy or radiation.  Presents to the ED with reports of worsening shortness of breath and nonproductive cough.  No fevers at home.  Notes bilateral leg swelling.  Shortness of breath is worse with exertion and improves with rest.  Chest x-ray in the ED showed increased vascular congestion.  Oxygen saturation on room air is 74% and improved to 94% on 4 L nasal oxygen.  Uses oxygen as needed at home. Patient recently at [facility] and was diagnosed prelim with high grade/large B cell lympoma and had follow up with oncologist Dr Maceo Sandhoff to start process for chemotherapy. . it was noted that blood level was low so patient was arranged from blood transfusion of 3 units of  RBC on 11/12. Patient was recentlly increased by nephrologist from lasix 80mg daily to 80mg in AM and 40mg in pm. Patient presents with sudden Acute on chronic hypoxic respiratory failure secondary to fluid overload and was referred for admission. Acute on chronic hypoxic respiratory failure 
- secondary to fluid overload possible from recent PRBC transfusion of 3 units.   
- on 2L NC prn but noted to have o2 saturation of 74% on RA and was placed on 4L NC  
- respiratory to titirate o2 as tolerated to maintain > 92%. - encourage incentive spirometry. - 11/16: improved slightly and down to 4L nc, noted nasal congestion so flonas started  
- with continued diuresis patient decreased to 3L NC this AM and saturating 97% and patient felt better and decreased patient back to her home 2L NC.  
  
Acute congestive heart failure unspecified 
- most likely fluid overload from recent 3 units of PRBC transfusion on outpatient done on 11/12 
- cxr showing vascular congestion / worsening chf 
-  Nephrology Dr. Olivia Monson follows patient and normally is on 80mg oral lasix daily and recent added 40mg in pm.  
  - initially began Lasix 40 mg IV twice daily but IV access was lost and on 11/15 discussed with nephology and will increase lasix to 80mg oral BID  
  -Fluid restriction to 1200 mL daily 
  -Strict input and output 
  -Monitor daily electrolytes and weights ·  -prelim 2D echoLV: 
·  Estimated LVEF is 65 - 70%. Normal cavity size and systolic function (ejection fraction normal). Moderate concentric hypertrophy. Wall motion: normal. Mild (grade 1) left ventricular diastolic dysfunction. · LA: Mildly dilated left atrium. Left Atrium volume index is 31 mL/m2. · AV: Aortic valve leaflet calcification present. · MV: Mitral valve thickening. · TV: Mild tricuspid valve regurgitation is present. · PV: Mild pulmonic valve regurgitation is present. · IVC: Moderately elevated central venous pressure (10-15 mmHg); IVC diameter is larger than 21 mm and collapses more than 50% with respiration. Patient fluid overload was secondary to recent 3 units of blood transfusion and has done well on 80mg lasix bid dosing with renal fxn staying stable at her baseline creat around 2 range.  Discussed with Dr. Olivia Monson and recommended to continue on previous dosing of 80mg lasix in AM and 40mg in PM. Will have patient follow up closely with nephrology.  
  
 
  Recent Dx of high grade / large B cell Lymphoma.  
  -Recent evaluation at [facility] with tonisilar mass causing dysphagia 
  -CT soft tissue neck from 10/28 reviewed.  Patient was seen by ENT at [facility] and biopsy prelim results show high grade large B cell lympoma 
  - had initial follow up with oncologist Dr. Arabella Ramirez on 11/11 and ordered 3 units of prbc transfusion and patient daugther notes she has follow up this week to start chemotherapy once IR places port  
- poor prognosis, chemo is palliative. - patient was scheduled to get a 2D echo and PET scan done on Monday but was missed secondary to being in acute care. 2D echo was performed and will reschedule patient PET scan to be done prior to her follow up visit with oncology  
  
History of renal transplant 
   -On immunosuppressants with prograf and cellcept.  
   - discussed with nephrology and will continue prograf at similar home dosing of 1mg bid and decrease cell cept to 500mg oral in AM and 250mg oral in PM 
- also continue current home dose of prednisone 7.5mg oral daily  
- will continue on lower dose of cell cept at night on discharge.  
  
Diabetes 
- sliding scale with accuechecks ac and bedtime 
- was on lispro with meals but will hold for now.  
  
HTN 
- blood pressure elevated on admission at 184/90 and monitor q4hrs.   
- restart patient home meds of norvasc, hydralazine and metoprolol and Imdur new dose of lasix at 80mg oral BD 
  
CKD: 
- follows with nephrologist Dr. Joe Martines  
- continue current bicarbonate dosing. 
- fluid resriciton 
- nephrology consultation pending  
  
Anemia: 
- stable hg at 11.2 on admission but is s/p 3 units of prbc but unknown what hg level was as previous hg at [facility] was 10.1 and repeat stable on 11/16 at 13.1 
- obtain iron levels pending   
- monitor closely  
- nephrology dc procrit until patient starts chemo 
  
Elevated D dimer  
- was 2.65 on admission but most likely elevated in setting of recent B cell Lymphoma diagnosis. - unable to do CTA secondary to CKD and VQ scan will be poor study on this patient.  
  
* Procedures: * No surgery found * Consults: Cardiology: 
Case discussed with collaborating physician Dr. Gaurav Colbert and our impression and recommendations are as follows: 1. HTN - BP above goal. Continue PO diuresis. Monitor closely and can make further adjustments under guidance from Nephrology. 2. Acute on Chronic HFpEF - Nephrology guiding Diuresis, otherwise keep BP under control. Cause of exacerbation is clear. Can follow-up with established Cardiologist 2-3 weeks after discharge given Nephrology manages her medications for the most part. 3. Elevated Dimer - ?related to CA. NO RV strain on Echo. Further per HM team.  
  
  
Thank you for involving us in the care of this patient. Please do not hesitate to call if additional questions arise. If after hours please call [phone] Vitals Last 24 Hours: 
Patient Vitals for the past 24 hrs: 
 Temp Pulse Resp BP SpO2  
11/17/20 1154 97.5 °F (36.4 °C) 71 20 (!) 157/73   
11/17/20 0746 97.5 °F (36.4 °C) 71 20 (!) 157/73 98 % 11/16/20 2350 97.1 °F (36.2 °C) 69 20 (!) 146/70 97 % 11/16/20 1740  70  (!) 140/61 94 % Discharge Exam: 
General: Alert, cooperative, no distress, appears stated age. Head:  Normocephalic, without obvious abnormality, atraumatic. Eyes:  Conjunctivae/corneas clear. Pupils equal, round, reactive to light. Extraocular movements intact. Lungs:  Clear to auscultation bilaterally. no wheeze, rales, crackles, rhonchi Chest wall: No tenderness or deformity. Heart:  Regular rate and rhythm, S1, S2 normal, no murmur, click, rub or gallop. Abdomen:  Soft, non-tender. Bowel sounds normal. No masses,  No organomegaly. Extremities: Extremities normal, atraumatic, no cyanosis or edema. Pulses: 2+ and symmetric all extremities.  
Skin: Skin color, texture, turgor normal. No rashes or lesions Neurologic: Awake, Alert, oriented. No obvious gross sensory or motor deficits Labs: 
Recent Results (from the past 24 hour(s)) GLUCOSE, POC Collection Time: 11/16/20  4:39 PM  
Result Value Ref Range Glucose (POC) 263 (H) 65 - 100 mg/dL Performed by Angie Ruelas, POC Collection Time: 11/16/20 10:34 PM  
Result Value Ref Range Glucose (POC) 109 (H) 65 - 100 mg/dL Performed by Belinda Rebollar   
CBC WITH AUTOMATED DIFF Collection Time: 11/17/20  6:14 AM  
Result Value Ref Range WBC 5.4 4.4 - 11.3 K/uL  
 RBC 3.97 (L) 4.50 - 5.90 M/uL  
 HGB 11.2 (L) 13.5 - 17.5 g/dL HCT 34.9 (L) 36 - 46 % MCV 88.0 80 - 100 FL  
 MCH 28.3 (L) 31 - 34 PG  
 MCHC 32.1 31.0 - 36.0 g/dL  
 RDW 17.6 (H) 11.5 - 14.5 % PLATELET 669 K/uL MPV 7.3 6.5 - 11.5 FL  
 NRBC 10.0  WBC ABSOLUTE NRBC 0.00 K/uL NEUTROPHILS 75 42 - 75 % LYMPHOCYTES 9 (L) 20.5 - 51.1 % MONOCYTES 14 (H) 1.7 - 9.3 % EOSINOPHILS 1 0.9 - 2.9 % BASOPHILS 1 0.0 - 2.5 % ABS. NEUTROPHILS 4.0 1.8 - 7.7 K/UL  
 ABS. LYMPHOCYTES 0.5 (L) 1.0 - 4.8 K/UL  
 ABS. MONOCYTES 0.7 0.2 - 2.4 K/UL  
 ABS. EOSINOPHILS 0.1 0.0 - 0.7 K/UL  
 ABS. BASOPHILS 0.0 0.0 - 0.2 K/UL METABOLIC PANEL, BASIC Collection Time: 11/17/20  6:14 AM  
Result Value Ref Range Sodium 137 136 - 145 mmol/L Potassium 4.2 3.5 - 5.1 mmol/L Chloride 99 97 - 108 mmol/L  
 CO2 32 21 - 32 mmol/L Anion gap 6 5 - 15 mmol/L Glucose 136 (H) 65 - 100 mg/dL BUN 50 (H) 6 - 20 mg/dL Creatinine 2.14 (H) 0.55 - 1.02 mg/dL BUN/Creatinine ratio 23 (H) 12 - 20 GFR est AA 28 (L) >60 ml/min/1.73m2 GFR est non-AA 23 (L) >60 ml/min/1.73m2 Calcium 9.3 8.5 - 10.1 mg/dL GLUCOSE, POC Collection Time: 11/17/20  7:52 AM  
Result Value Ref Range Glucose (POC) 119 (H) 65 - 100 mg/dL Performed by Marcia Burgess Imaging: Xr Chest [place] Result Date: 11/14/2020 IMPRESSION: Worsening CHF.  
  
 
 
* Discharge Condition: improved * Disposition: Home w/Family and HH PT, OT, RN  
- home health to monitor vitals and o2 saturation on patient chronic 2L NC and assist with monitoring daily weights. Also needs PT /OT eval and treat. Discharge Medications: 
Current Discharge Medication List  
  
START taking these medications Details  
!! mycophenolate mofetil (CELLCEPT) 250 mg capsule Take 2 Caps by mouth Daily (before breakfast). Qty: 60 Cap, Refills: 0  
  
!! mycophenolate mofetil (CELLCEPT) 250 mg capsule Take 1 Cap by mouth nightly. Qty: 30 Cap, Refills: 0 phenol throat spray (CHLORASEPTIC) 1.4 % spray Take 1 Spray by mouth three (3) times daily as needed for Sore throat. Qty: 1 Bottle, Refills: 0  
  
!! furosemide (LASIX) 40 mg tablet Take 2 Tabs by mouth Daily (before breakfast). Qty: 30 Tab, Refills: 0  
  
!! furosemide (LASIX) 40 mg tablet Take 1 Tab by mouth Daily (before dinner). Qty: 30 Tab, Refills: 0  
  
 !! - Potential duplicate medications found. Please discuss with provider. CONTINUE these medications which have NOT CHANGED Details  
zolpidem (Ambien) 5 mg tablet Take 5 mg by mouth nightly. melatonin 3 mg tablet Take 3 mg by mouth nightly. insulin lispro (HumaLOG U-100 Insulin) 100 unit/mL injection 5 Units by SubCUTAneous route Before breakfast, lunch, and dinner. tacrolimus (PROGRAF) 1 mg capsule Take 1 Cap by mouth every twelve (12) hours for 30 days. Qty: 60 Cap, Refills: 0  
  
amLODIPine (NORVASC) 10 mg tablet Take 10 mg by mouth daily. metoprolol tartrate (LOPRESSOR) 50 mg tablet Take 50 mg by mouth two (2) times a day. calcitRIOL (ROCALTROL) 0.25 mcg capsule Take 0.25 mcg by mouth daily. hydrALAZINE (APRESOLINE) 50 mg tablet Take 50 mg by mouth three (3) times daily. isosorbide mononitrate ER (IMDUR) 60 mg CR tablet Take 60 mg by mouth two (2) times a day. omeprazole (PRILOSEC) 40 mg capsule Take 40 mg by mouth daily. sodium bicarbonate 650 mg tablet Take 1,300 mg by mouth four (4) times daily. gemfibroziL (LOPID) 600 mg tablet Take 600 mg by mouth daily. predniSONE (DELTASONE) 2.5 mg tablet Take 7.5 mg by mouth daily (with breakfast). aspirin delayed-release 81 mg tablet Take 81 mg by mouth daily. cholecalciferol (VITAMIN D3) (2,000 UNITS /50 MCG) cap capsule Take  by mouth two (2) times a day. STOP taking these medications  
  
 mycophenolic acid (MYFORTIC) 973 mg delayed release tablet Comments:  
Reason for Stopping:   
   
  
 
 
 
* Follow-up Care/Patient Instructions: Activity: Activity as tolerated Diet: Cardiac Diet, Diabetic Diet and fluid restriction to 1.5L per day Follow-up Information Follow up With Specialties Details Why Contact Info [address] [place] [address] ([place]) [address] [place] [address]. [address] [place] [address] 
[phone] Vicente Santos MD Family Medicine On 11/19/2020 @ 9:00 [address] [place] [id number] 
[phone] Riccardo Mazariegos MD Hematology and Oncology In 1 week  [address] [place] [address] [address] [id number] 
[phone] Ritu Grier MD Nephrology, Internal Medicine In 2 weeks  [address] [id number] [phone] Spent 35 minutes evaluting and coordinating patient care of which >50% was spent coordinating and counseling.   
 
Signed: 
Little Alcaraz MD 
11/17/2020 
11:44 AM

## 2020-11-17 NOTE — PROGRESS NOTES
PLEASE NOTE--Encounter / Re-Admission Within 30 Days This patient has had another encounter or admission within the last 30 days.

## 2020-11-18 NOTE — PROGRESS NOTES
Received a call from [facility], they are not in network with [facility] for [place]. I sent referrall to [facility], per Addie at [facility], she stated that they would take the insurance. Also called Patrick Hines Coordinator with [facility], [phone], and left message to see about other [facility] in network . Did a search on the [facility] website and did not locate anyone in network within 50 miles.

## 2020-11-19 NOTE — PROGRESS NOTES
Called patient at home to let her know I was continuing to work on finding a home health in the area and in network. She stated she understood. Asked her if she had any concerns that I could help her with, she said no. Asked her if she got her medication straight and she stated yes. Informed her that I will call her as soon as home health is set up.

## 2020-11-19 NOTE — PROGRESS NOTES
Called [facility], Kun Correa, Care coordinator again, [phone], no answer, left a message again.

## 2020-11-20 NOTE — PROGRESS NOTES
Ina Flores , at home care  Nurse, stated they could open the patient but could not go out until Saturday. Called pt to inform her.

## 2020-11-24 PROBLEM — C85.90 LYMPHOMA (HCC): Status: ACTIVE | Noted: 2020-01-01

## 2020-11-24 NOTE — CONSULTS
Renal Consult Note Admit Date: 11/24/2020 HPI:  
58-year-old -American lady well-known to me who is known to be a renal transplant recipient. 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 Last month she was diagnosed with lymphoma in her tonsils and has seen Dr. Smith Sanchez regarding this. She was due to start chemotherapy this week. She was brought into the emergency room with complaints of sore throat and spitting up blood. She also has some shortness of breath. She is currently on a facemask. She tells me that she is hungry but is unable to eat. Last weekend she was at [facility] with shortness of breath and was treated with diuretics. Mycophenolate dose was decreased to 250 mg twice daily. She continued on tacrolimus. Current Facility-Administered Medications Medication Dose Route Frequency  vancomycin (VANCOCIN) 1,000 mg in 0.9% sodium chloride 250 mL (VIAL-MATE)  1,000 mg IntraVENous NOW  piperacillin-tazobactam (ZOSYN) 3.375 g in 0.9% sodium chloride (MBP/ADV) 100 mL MBP  3.375 g IntraVENous NOW Current Outpatient Medications Medication Sig  
 mycophenolate mofetil (CELLCEPT) 250 mg capsule Take 2 Caps by mouth Daily (before breakfast).  mycophenolate mofetil (CELLCEPT) 250 mg capsule Take 1 Cap by mouth nightly.  phenol throat spray (CHLORASEPTIC) 1.4 % spray Take 1 Spray by mouth three (3) times daily as needed for Sore throat.  furosemide (LASIX) 40 mg tablet Take 2 Tabs by mouth Daily (before breakfast).  furosemide (LASIX) 40 mg tablet Take 1 Tab by mouth Daily (before dinner).  zolpidem (Ambien) 5 mg tablet Take 5 mg by mouth nightly.  melatonin 3 mg tablet Take 3 mg by mouth nightly.  insulin lispro (HumaLOG U-100 Insulin) 100 unit/mL injection 5 Units by SubCUTAneous route Before breakfast, lunch, and dinner.   
 tacrolimus (PROGRAF) 1 mg capsule Take 1 Cap by mouth every twelve (12) hours for 30 days.  
 amLODIPine (NORVASC) 10 mg tablet Take 10 mg by mouth daily.  metoprolol tartrate (LOPRESSOR) 50 mg tablet Take 50 mg by mouth two (2) times a day.  calcitRIOL (ROCALTROL) 0.25 mcg capsule Take 0.25 mcg by mouth daily.  hydrALAZINE (APRESOLINE) 50 mg tablet Take 50 mg by mouth three (3) times daily.  isosorbide mononitrate ER (IMDUR) 60 mg CR tablet Take 60 mg by mouth two (2) times a day.  omeprazole (PRILOSEC) 40 mg capsule Take 40 mg by mouth daily.  sodium bicarbonate 650 mg tablet Take 1,300 mg by mouth four (4) times daily.  gemfibroziL (LOPID) 600 mg tablet Take 600 mg by mouth daily.  predniSONE (DELTASONE) 2.5 mg tablet Take 7.5 mg by mouth daily (with breakfast).  aspirin delayed-release 81 mg tablet Take 81 mg by mouth daily.  cholecalciferol (VITAMIN D3) (2,000 UNITS /50 MCG) cap capsule Take  by mouth two (2) times a day. Facility-Administered Medications Ordered in Other Encounters Medication Dose Route Frequency  lidocaine-EPINEPHrine (XYLOCAINE) 1 %-1:100,000 injection 200 mg  20 mL IntraDERMal ONCE Past Medical History:  
Diagnosis Date  Chronic kidney disease  Diabetes ([facility]  Heart failure ([facility]  Hypertension  Kidney disease  Sleep apnea  Throat cancer ([facility] Past Surgical History:  
Procedure Laterality Date  HX CHOLECYSTECTOMY  HX RENAL TRANSPLANT Family History Problem Relation Age of Onset  Hypertension Mother Social History Tobacco Use  Smoking status: Never Smoker  Smokeless tobacco: Never Used Substance Use Topics  Alcohol use: Never Frequency: Never Review of Systems Review of Systems Constitutional: Positive for chills and fever. Respiratory: Positive for shortness of breath. Negative for cough and sputum production. Cardiovascular: Negative for chest pain and palpitations. Gastrointestinal: Negative for abdominal pain, nausea and vomiting. Genitourinary: Negative for dysuria. Neurological: Negative for dizziness and headaches. Physical Exam:  
 
Physical Exam  
Constitutional: She is oriented to person, place, and time. Neck: No JVD present. Cardiovascular: Normal rate. Murmur heard. Pulmonary/Chest: Effort normal. She has no rales. Abdominal: Soft. Bowel sounds are normal. There is no abdominal tenderness. Musculoskeletal:     
   General: Edema present. Neurological: She is alert and oriented to person, place, and time. Skin: Skin is warm. Had posterior cervical and anterior cervical lymph nodes more on the right than on the left. Allograft was in the right lower quadrant and was nontender. Patient Vitals for the past 8 hrs: 
 BP Temp Pulse Resp SpO2 Height Weight 11/24/20 1500 (!) 179/71  69 22 100 %    
11/24/20 1400 (!) 142/68 98.6 °F (37 °C) 68 24 100 %    
11/24/20 1300 (!) 155/70  72 24 100 %    
11/24/20 1225 (!) 176/74  81 20 100 %    
11/24/20 1225 (!) 176/74  82      
11/24/20 1042 (!) 171/76 99.9 °F (37.7 °C) 72 24 100 %    
11/24/20 0946 (!) 169/73 99.9 °F (37.7 °C) 80 24 94 % 5' 3\" (1.6 m) 66.2 kg (146 lb) No intake/output data recorded. No intake/output data recorded. CT NECK SOFT TISSUE WO CONT Final Result IMPRESSION: Continued enlargement of right oropharyngeal mass with increased  
airway compression CT CHEST WO CONT Final Result IMPRESSION: Significant compressive atelectasis through posterior lower lobe  
lungs. Moderate volume dependent pleural effusions. Suspect mild central  
interstitial pulmonary edema. 6 mm subpleural ERICKA nodule. Other pulmonary nodules may be obscured noting lower  
lobe lung atelectasis. Supraclavicular and mediastinal lymphadenopathy. Recent PET/CT findings noted. XR CHEST SNGL V Final Result Impression:   
Persistent perihilar and lower lobe airspace opacities, improved since the prior  
exam, could represent infection or edema in the appropriate clinical setting. Trace left pleural effusion. Data Review Recent Labs  
  11/24/20 
1025 WBC 4.7 HGB 9.9*  
HCT 32.9*  
 Recent Labs  
  11/24/20 
1025 *  
K 5.4* CL 98  
CO2 33* * BUN 40* CREA 2.41* CA 9.2 MG 1.8 ALB 3.2* ALT 10* INR 1.1 No components found for: [place] No results for input(s): PH, PCO2, PO2, HCO3, FIO2 in the last 72 hours. Recent Labs  
  11/24/20 
1025 INR 1.1 Assessment:  
 
 
 
 
Active Problems: * No active hospital problems. * 
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 Anemia of chronic disease. Modest hyperkalemia Hypertension. Type 2 diabetes mellitus. Plan: Will decrease immunosuppression in preparation for her chemotherapy. We will follow renal function closely looking for signs of rejection. Restrict potassium in diet. No indication for extracorporeal therapy. Will need JOSE ROBERTO based on chemotherapy. Thank you for this consult

## 2020-11-24 NOTE — ED TRIAGE NOTES
Pt comes in as a transfer from [facility] for COVID +. Pt arrives on NRB. Pt has an oropharyngeal mass that is impeding on her airway. Pt transferred to [facility] for ENT eval. PMHx of kidney transplant and lymphoma. Pt scheduled to start chemo this week.

## 2020-11-24 NOTE — ED NOTES
Tried to contact the daughter and there was no answer. The mailbox was full. I received a call at [phone] and let Alexandria Gao know that her mom is on her way to [facility] and that all of her belongings are with her.

## 2020-11-24 NOTE — PROGRESS NOTES
Arrived to sameday  Room 1   Verified name and   By armband and pt verbalized States not feeling well today  Woke up with blood in my  Mouth  Mask removed  Dry blood noted around mouth and nostrils no active bleeding  Visible   VSS  Temp 100.9  Phone call to angio  Advised of findings case cancelled per DR Contreras  Phone call to  DR Rangel Verma advised of findings  States to send pt to er for evaluation  Phone call to er  Charge nurse Abigail Luna report given and room asssigment ER21 pt taken via stretcher to ER room 21  Daughter  With pt

## 2020-11-24 NOTE — ED TRIAGE NOTES
Pt was at same day to get a port a cath placed. Pt was not feeling well and running a fever so she was sent down here to be admitted. Pt dx with throat cancer

## 2020-11-24 NOTE — ED PROVIDER NOTES
HPI the patient presents as a transfer from [facility], [place].  She was seen there today for a fever and cough and oropharyngeal mass. She was diagnosed with Covid today. A CT of her neck shows a right-sided oropharyngeal mass that is encroaching the airway. Apparently the patient also had an episode of bleeding in the mouth, which was thought to be from the mass. ENT was consulted and they felt the patient should be admitted to the ICU for observation and treatment. The patient admits to having some cough and sore throat but at the present time, is in no respiratory distress. Past Medical History:  
Diagnosis Date  Chronic kidney disease  Diabetes ([facility]  Heart failure ([facility] [facility]  Hypertension  Kidney disease  Sleep apnea  Throat cancer ([facility] Past Surgical History:  
Procedure Laterality Date  HX CHOLECYSTECTOMY  HX RENAL TRANSPLANT Family History:  
Problem Relation Age of Onset  Hypertension Mother Social History Socioeconomic History  Marital status:  Spouse name: Not on file  Number of children: Not on file  Years of education: Not on file  Highest education level: Not on file Occupational History  Not on file Social Needs  Financial resource strain: Not on file  Food insecurity Worry: Not on file Inability: Not on file  Transportation needs Medical: Not on file Non-medical: Not on file Tobacco Use  Smoking status: Never Smoker  Smokeless tobacco: Never Used Substance and Sexual Activity  Alcohol use: Never Frequency: Never  Drug use: Never  Sexual activity: Not on file Lifestyle  Physical activity Days per week: Not on file Minutes per session: Not on file  Stress: Not on file Relationships  Social connections Talks on phone: Not on file Gets together: Not on file Attends [employer] service: Not on file   Active member of club or organization: Not on file Attends meetings of clubs or organizations: Not on file Relationship status: Not on file  Intimate partner violence Fear of current or ex partner: Not on file Emotionally abused: Not on file Physically abused: Not on file Forced sexual activity: Not on file Other Topics Concern  Not on file Social History Narrative  Not on file ALLERGIES: Benadryl [diphenhydramine hcl]; Fentanyl; Morphine; and Versed [midazolam] Review of Systems Constitutional: Positive for fever. HENT: Positive for sore throat. Respiratory: Positive for cough. Cardiovascular: Negative for chest pain. Gastrointestinal: Negative for vomiting. Genitourinary: Negative for flank pain. Musculoskeletal: Negative for back pain. Neurological: Negative for headaches. Psychiatric/Behavioral: Negative for confusion. Vitals:  
 11/24/20 1750 11/24/20 1752 11/24/20 1753 BP: (!) 195/74  (!) 195/74 Pulse:   63 Resp:   17 SpO2:  100% 100% Physical Exam 
Constitutional:   
   General: She is not in acute distress. Appearance: She is well-developed. HENT:  
   Head: Normocephalic. Neck: Musculoskeletal: Normal range of motion. Cardiovascular:  
   Rate and Rhythm: Normal rate. Heart sounds: No murmur. Pulmonary:  
   Effort: Pulmonary effort is normal.  
   Comments: Decreased breath sounds bilaterally Abdominal:  
   Palpations: Abdomen is soft. Tenderness: There is no abdominal tenderness. Musculoskeletal: Normal range of motion. Skin: 
   General: Skin is warm and dry. Capillary Refill: Capillary refill takes less than 2 seconds. Neurological:  
   Mental Status: She is alert. Psychiatric:     
   Behavior: Behavior normal.  
 
  
 
MDM Procedures I spoke with the intensivist and he agrees to admit the patient.

## 2020-11-24 NOTE — ROUTINE PROCESS
TRANSFER - OUT REPORT: 
 
Verbal report given to Breann Mijares on Alee Staples  being transferred to [facility] ED for routine progression of care Report consisted of patients Situation, Background, Assessment and  
Recommendations(SBAR). Information from the following report(s) SBAR, ED Summary, [facility] and Recent Results was reviewed with the receiving nurse. Lines:  
Peripheral IV 11/24/20 Right Hand (Active) Site Assessment Clean, dry, & intact 11/24/20 1031 Phlebitis Assessment 0 11/24/20 1031 Infiltration Assessment 0 11/24/20 1031 Dressing Status Clean, dry, & intact 11/24/20 1031 Dressing Type Tape;Transparent 11/24/20 1031 Hub Color/Line Status Patent; Flushed 11/24/20 1031 Action Taken Blood drawn 11/24/20 1031 Opportunity for questions and clarification was provided. Patient transported with: 
[employer]

## 2020-11-24 NOTE — H&P
[facility] 
 
ICU TEAM history and physical 
 
Name: Michael Velarde : 1950 MRN: [id number] Date: 2020 Reason for ICU Admission: Neck mass with impending airway compromise HPI: 
This is a 58-year-old female with multiple medical problem was recently diagnosed with diffuse large cell lymphoma late 2020 after tonsillar biopsy. She did not start treatment yet. For the last few days she noticed intermittent chills and fever. She also noticed some occasional shortness of breath and cough associated with minimal blood mixed with the phlegm. The bleeding is not progressive and she has not seen it in a few days now. No nausea no vomiting though she admits that her oral intake been decreasing and she is feeling overall tired. Today she went to her local hospital to get port inserted to start her chemotherapy where they found her to be febrile ,Covid testing was positive and CAT scan of the neck showed worsening of the compromise of the airway so ENT was contacted from that ER and he recommended transferring care to our hospital for further evaluation. At the time of my examination patient is laying in bed not in distress, hoarse voice but according to him not much changed, able to swallow her secretions no drooling. Her main complaint is generalized fatigue and some pain in her throat which been there for a while. Active Problem List:  
 
Problem List  Date Reviewed: 2020 Codes Class Lymphoma ([facility] [address] ICD-10-CM: C85.90 ICD-9-CM: 202.80 CHF (congestive heart failure) (HCC) ICD-10-CM: I50.9 ICD-9-CM: 428.0 Tonsillar mass ICD-10-CM: J35.8 ICD-9-CM: 474.8 Past Medical History:  
 
 has a past medical history of Chronic kidney disease, Diabetes ([address] Heart failure ([address] Hypertension, Kidney disease, Sleep apnea, and Throat cancer ([address]  
 
Past Surgical History:  
 
 has a past surgical history that includes hx cholecystectomy and hx renal transplant. Home Medications:  
 
Prior to Admission medications Medication Sig Start Date End Date Taking? Authorizing Provider  
mycophenolate mofetil (CELLCEPT) 250 mg capsule Take 2 Caps by mouth Daily (before breakfast). 11/18/20   Juanjo Meirno MD  
mycophenolate mofetil (CELLCEPT) 250 mg capsule Take 1 Cap by mouth nightly. 11/17/20   Nuvia Chino MD  
phenol throat spray (CHLORASEPTIC) 1.4 % spray Take 1 Spray by mouth three (3) times daily as needed for Sore throat. 11/17/20   Nuvia Chino MD  
furosemide (LASIX) 40 mg tablet Take 2 Tabs by mouth Daily (before breakfast). 11/17/20   Nuvia Chino MD  
furosemide (LASIX) 40 mg tablet Take 1 Tab by mouth Daily (before dinner). 11/17/20   Nuvia Chino MD  
zolpidem (Ambien) 5 mg tablet Take 5 mg by mouth nightly. Provider, Historical  
melatonin 3 mg tablet Take 3 mg by mouth nightly. Provider, Historical  
insulin lispro (HumaLOG U-100 Insulin) 100 unit/mL injection 5 Units by SubCUTAneous route Before breakfast, lunch, and dinner. Provider, Historical  
tacrolimus (PROGRAF) 1 mg capsule Take 1 Cap by mouth every twelve (12) hours for 30 days. 10/28/20 11/27/20  Mariia SNOW MD  
amLODIPine (NORVASC) 10 mg tablet Take 10 mg by mouth daily. Provider, Historical  
metoprolol tartrate (LOPRESSOR) 50 mg tablet Take 50 mg by mouth two (2) times a day. Provider, Historical  
calcitRIOL (ROCALTROL) 0.25 mcg capsule Take 0.25 mcg by mouth daily. Provider, Historical  
hydrALAZINE (APRESOLINE) 50 mg tablet Take 50 mg by mouth three (3) times daily. Provider, Historical  
isosorbide mononitrate ER (IMDUR) 60 mg CR tablet Take 60 mg by mouth two (2) times a day. Provider, Historical  
omeprazole (PRILOSEC) 40 mg capsule Take 40 mg by mouth daily. Provider, Historical  
sodium bicarbonate 650 mg tablet Take 1,300 mg by mouth four (4) times daily. Provider, Historical  
gemfibroziL (LOPID) 600 mg tablet Take 600 mg by mouth daily. Provider, Historical  
predniSONE (DELTASONE) 2.5 mg tablet Take 7.5 mg by mouth daily (with breakfast). Provider, Historical  
aspirin delayed-release 81 mg tablet Take 81 mg by mouth daily. Provider, Historical  
cholecalciferol (VITAMIN D3) (2,000 UNITS /50 MCG) cap capsule Take  by mouth two (2) times a day. Provider, Historical  
 
 
Allergies/Social/Family History: Allergies Allergen Reactions  Benadryl [Diphenhydramine Hcl] Other (comments)  Fentanyl Other (comments)  Morphine Unknown (comments)  Versed [Midazolam] Other (comments) Social History Tobacco Use  Smoking status: Never Smoker  Smokeless tobacco: Never Used Substance Use Topics  Alcohol use: Never Frequency: Never Family History Problem Relation Age of Onset  Hypertension Mother Review of Systems:  
 
10 system reviewed and they are negative but as in interval history Objective:  
Vital Signs: 
Visit Vitals BP (!) 195/74 (BP 1 Location: Left arm, BP Patient Position: At rest) Pulse 63 Resp 17 SpO2 100% Temp (24hrs), Av.8 °F (37.7 °C), Min:98.6 °F (37 °C), Max:100.9 °F (38.3 °C) Intake/Output:  
No intake or output data in the 24 hours ending 20 Physical Exam: 
 
General:  Alert, cooperative, chronically ill looking Eyes:  Sclera anicteric. Pupils equally round and reactive to light. Mouth/Throat:  Mallampati 4, there is an area but seems to be an old incision no active bleeding no fresh blood Neck: Supple, lymph node appreciated Lungs:   Clear to auscultation bilaterally, good effort CV:  Regular rate and rhythm,no murmur, click, rub or gallop Abdomen:   Soft, non-tender. bowel sounds normal. non-distended, transplanted kidney palpable in left lower quadrant Extremities: No cyanosis or edema Skin: Skin color, texture, turgor normal. no acute rash or lesions Lymph nodes: Cervical and supraclavicular normal  
Musculoskeletal: No swelling or deformity Lines/Devices:  Intact, no erythema, drainage or tenderness Psych: Alert and oriented, normal mood affect given the setting LABS AND  DATA: Personally reviewed Recent Labs  
  11/24/20 
1025 WBC 4.7 HGB 9.9*  
HCT 32.9*  
 Recent Labs  
  11/24/20 
1025 *  
K 5.4* CL 98  
CO2 33* BUN 40* CREA 2.41* * CA 9.2 MG 1.8 Recent Labs  
  11/24/20 
1025 * TP 7.5 ALB 3.2*  
GLOB 4.3*  
LPSE 114 Recent Labs  
  11/24/20 
1025 INR 1.1 PTP 14.5 APTT 40.7* No results for input(s): PHI, PCO2I, PO2I, FIO2I in the last 72 hours. Recent Labs  
  11/24/20 
1025 TROIQ <0.05 Hemodynamics:  
PAP:   CO:    
Wedge:   CI:    
CVP:    SVR:    
  PVR:    
 
Ventilator Settings: 
Mode Rate Tidal Volume Pressure FiO2 PEEP Peak airway pressure:     
Minute ventilation:     
 
 
MEDS: Reviewed Chest X-Ray: 
CT chest and neck reviewed myself and agree with report as documented CXR Results  (Last 48 hours)  
          
 11/24/20 1023  XR CHEST SNGL V Final result Impression:  Impression:   
Persistent perihilar and lower lobe airspace opacities, improved since the prior  
exam, could represent infection or edema in the appropriate clinical setting. Trace left pleural effusion. Narrative:  Clinical history: Fever Comparison: Chest radiograph 11/14/2020. Findings:   
Single view chest. Multiple surgical clips project over the medial right upper  
extremity. A vascular stent projects over the upper right hemithorax. The lungs  
are adequately expanded. There are patchy perihilar and lower lobe airspace  
opacities which are improved compared to the prior exam. Trace left pleural  
effusion. No pneumothorax. Cardiac silhouette is normal in size. Osseous  
structures are unchanged. ECHO: November 16, 2020 · LV: Estimated LVEF is 65 - 70%. Normal cavity size and systolic function (ejection fraction normal). Moderate concentric hypertrophy. Wall motion: normal. Mild (grade 1) left ventricular diastolic dysfunction. · LA: Mildly dilated left atrium. Left Atrium volume index is 31 mL/m2. · AV: Aortic valve leaflet calcification present. · MV: Mitral valve thickening. · TV: Mild tricuspid valve regurgitation is present. · PV: Mild pulmonic valve regurgitation is present. · IVC: Moderately elevated central venous pressure (10-15 mmHg); IVC diameter is larger than 21 mm and collapses more than 50% with respiration. Assessment and Plan:  
Diffuse large B-cell lymphoma: Diagnosed in October. Did not receive chemotherapy yet. No coagulopathy or thrombocytopenia. I placed a consult to hematology oncology and appreciate their input. She has an oncologist and [phone] but does not seem to be on medical staff in this hospital. 
COVID-19 positive: Pending PCR test.  Pending inflammatory markers though these could be affected by the lymphoma diagnosis. She will be started on steroid. No clear evidence of pneumonia on CAT scan though this may evolve later. At baseline patient uses 3 L nasal cannula. Will consider remdesivir and other therapeutic option if indicated. Impending airway compromise: At this time she is able to swallow her secretions, no stridor and able to communicate. ER physician contacted ENT and I appreciate their opinion. Status post kidney transplant on immunosuppression: Resume CellCept and tacrolimus, will consult nephrology to further adjust this medication giving her lymphoma diagnosis. Acute on chronic kidney disease: IV fluid hydration, appreciate nephrology input COPD: Does not seem to be in acute exacerbation. She is already on steroid. Resume nebulization treatment. Chronic anemia: 
Diabetes mellitus type 2: Sliding scale Hypertension: Restart home medication DVT and GI prophylaxis I called her daughter Ms. Joann Olguin over the phone and gave her an update. CRITICAL CARE CONSULTANT NOTE I had a face to face encounter with the patient, reviewed and interpreted patient data including clinical events, labs, images, vital signs, I/O's, and examined patient. I have discussed the case and the plan and management of the patient's care with the consulting services, the bedside nurses and the respiratory therapist.   
 
NOTE OF PERSONAL INVOLVEMENT IN CARE This patient has a high probability of imminent, clinically significant deterioration, which requires the highest level of preparedness to intervene urgently. I participated in the decision-making and personally managed or directed the management of the following life and organ supporting interventions that required my frequent assessment to treat or prevent imminent deterioration. I personally spent 40 minutes of critical care time. This is time spent at this critically ill patient's bedside actively involved in patient care as well as the coordination of care and discussions with the patient's family. This does not include any procedural time which has been billed separately. Brennen Sorto M.D. Staff Intensivist/Pulmonologist 
[facility] 
11/24/2020

## 2020-11-24 NOTE — ED PROVIDER NOTES
EMERGENCY DEPARTMENT HISTORY AND PHYSICAL EXAM 
 
 
Date: 11/24/2020 Patient Name: Yanni Mar History of Presenting Illness Chief Complaint Patient presents with  Chills  Sore Throat History Provided By: Patient and Patient's Daughter HPI: Yanni Mar, 69 y.o. female with a past medical history significant diabetes, hypertension, hyperlipidemia and renal insufficiency presents to the ED with chief complaint of Chills and Sore Throat Leonard Calvert Patient has a history of throat cancer. This was to be getting a port placed today in same-day surgery when they noticed that she had a fever. She has been having intermittent chills. Intermittent coughing. Intermittent shortness of breath. Also had an episode of bleeding from her mouth. That is since stopped. No nausea vomiting diarrhea. Does feel weak. There are no other complaints, changes, or physical findings at this time. PCP: Yordy Sellers MD 
 
Current Facility-Administered Medications Medication Dose Route Frequency Provider Last Rate Last Dose  vancomycin (VANCOCIN) 1,000 mg in 0.9% sodium chloride 250 mL (VIAL-MATE)  1,000 mg IntraVENous NOW Madisyn Xiong MD      
 piperacillin-tazobactam (ZOSYN) 3.375 g in 0.9% sodium chloride (MBP/ADV) 100 mL MBP  3.375 g IntraVENous NOW Bea Spears MD 25 mL/hr at 11/24/20 1225 3.375 g at 11/24/20 1225 Current Outpatient Medications Medication Sig Dispense Refill  mycophenolate mofetil (CELLCEPT) 250 mg capsule Take 2 Caps by mouth Daily (before breakfast). 60 Cap 0  
 mycophenolate mofetil (CELLCEPT) 250 mg capsule Take 1 Cap by mouth nightly. 30 Cap 0  phenol throat spray (CHLORASEPTIC) 1.4 % spray Take 1 Spray by mouth three (3) times daily as needed for Sore throat. 1 Bottle 0  
 furosemide (LASIX) 40 mg tablet Take 2 Tabs by mouth Daily (before breakfast).  30 Tab 0  
 furosemide (LASIX) 40 mg tablet Take 1 Tab by mouth Daily (before dinner). 30 Tab 0  
 zolpidem (Ambien) 5 mg tablet Take 5 mg by mouth nightly.  melatonin 3 mg tablet Take 3 mg by mouth nightly.  insulin lispro (HumaLOG U-100 Insulin) 100 unit/mL injection 5 Units by SubCUTAneous route Before breakfast, lunch, and dinner.  tacrolimus (PROGRAF) 1 mg capsule Take 1 Cap by mouth every twelve (12) hours for 30 days. 60 Cap 0  
 amLODIPine (NORVASC) 10 mg tablet Take 10 mg by mouth daily.  metoprolol tartrate (LOPRESSOR) 50 mg tablet Take 50 mg by mouth two (2) times a day.  calcitRIOL (ROCALTROL) 0.25 mcg capsule Take 0.25 mcg by mouth daily.  hydrALAZINE (APRESOLINE) 50 mg tablet Take 50 mg by mouth three (3) times daily.  isosorbide mononitrate ER (IMDUR) 60 mg CR tablet Take 60 mg by mouth two (2) times a day.  omeprazole (PRILOSEC) 40 mg capsule Take 40 mg by mouth daily.  sodium bicarbonate 650 mg tablet Take 1,300 mg by mouth four (4) times daily.  gemfibroziL (LOPID) 600 mg tablet Take 600 mg by mouth daily.  predniSONE (DELTASONE) 2.5 mg tablet Take 7.5 mg by mouth daily (with breakfast).  aspirin delayed-release 81 mg tablet Take 81 mg by mouth daily.  cholecalciferol (VITAMIN D3) (2,000 UNITS /50 MCG) cap capsule Take  by mouth two (2) times a day. Facility-Administered Medications Ordered in Other Encounters Medication Dose Route Frequency Provider Last Rate Last Dose  lidocaine-EPINEPHrine (XYLOCAINE) 1 %-1:100,000 injection 200 mg  20 mL IntraDERMal ONCE Shamar Cornell DO Past History Past Medical History: 
Past Medical History:  
Diagnosis Date  Chronic kidney disease  Diabetes ([facility] [address]  Heart failure ([facility] [address]  Hypertension  Kidney disease  Sleep apnea  Throat cancer ([facility] [address] Past Surgical History: 
Past Surgical History:  
Procedure Laterality Date  HX CHOLECYSTECTOMY  HX RENAL TRANSPLANT Family History: 
Family History Problem Relation Age of Onset  Hypertension Mother Social History: 
Social History Tobacco Use  Smoking status: Never Smoker  Smokeless tobacco: Never Used Substance Use Topics  Alcohol use: Never Frequency: Never  Drug use: Never Allergies: Allergies Allergen Reactions  Benadryl [Diphenhydramine Hcl] Other (comments)  Fentanyl Other (comments)  Morphine Unknown (comments)  Versed [Midazolam] Other (comments) Review of Systems Review of Systems Constitutional: Positive for chills, fatigue and fever. Negative for diaphoresis. HENT: Positive for congestion and sore throat. Negative for ear pain and nosebleeds. Eyes: Negative. Negative for pain, discharge and redness. Respiratory: Positive for cough and shortness of breath. Negative for chest tightness and wheezing. Cardiovascular: Negative. Negative for chest pain, palpitations and leg swelling. Gastrointestinal: Negative. Negative for abdominal pain, constipation, diarrhea, nausea and vomiting. Endocrine: Negative. Negative for cold intolerance. Genitourinary: Negative. Negative for difficulty urinating, dysuria and hematuria. Musculoskeletal: Negative. Negative for arthralgias, joint swelling and neck pain. Skin: Negative. Negative for color change, pallor, rash and wound. Allergic/Immunologic: Negative. Neurological: Positive for weakness. Negative for dizziness, syncope, light-headedness and headaches. Hematological: Negative. Does not bruise/bleed easily. Psychiatric/Behavioral: Negative. Negative for behavioral problems, confusion and suicidal ideas. All other systems reviewed and are negative. Physical Exam  
Physical Exam 
Vitals signs and nursing note reviewed. Exam conducted with a chaperone present. Constitutional:   
   Appearance: Normal appearance. She is normal weight. HENT:  
   Head: Normocephalic and atraumatic.   
   Comments: Blood At the mouth but no active bleeding. Nose: Nose normal.  
   Mouth/Throat:  
   Mouth: Mucous membranes are moist.  
   Pharynx: Oropharynx is clear. Eyes:  
   Extraocular Movements: Extraocular movements intact. Conjunctiva/sclera: Conjunctivae normal.  
   Pupils: Pupils are equal, round, and reactive to light. Neck: Musculoskeletal: Normal range of motion and neck supple. Cardiovascular:  
   Rate and Rhythm: Normal rate and regular rhythm. Pulses: Normal pulses. Heart sounds: Normal heart sounds. Pulmonary:  
   Effort: Pulmonary effort is normal. No respiratory distress. Breath sounds: Normal breath sounds. Abdominal:  
   General: Abdomen is flat. Bowel sounds are normal. There is no distension. Palpations: Abdomen is soft. Tenderness: There is no abdominal tenderness. There is no guarding. Musculoskeletal: Normal range of motion. General: No swelling, tenderness, deformity or signs of injury. Right lower leg: No edema. Left lower leg: No edema. Skin: 
   General: Skin is warm and dry. Capillary Refill: Capillary refill takes less than 2 seconds. Findings: No lesion or rash. Neurological:  
   General: No focal deficit present. Mental Status: She is alert and oriented to person, place, and time. Mental status is at baseline. Cranial Nerves: No cranial nerve deficit. Psychiatric:     
   Mood and Affect: Mood normal.     
   Behavior: Behavior normal.     
   Thought Content: Thought content normal.     
   Judgment: Judgment normal.  
 
 
 
Diagnostic Study Results Labs - Recent Results (from the past 12 hour(s)) GLUCOSE, POC Collection Time: 11/24/20  9:20 AM  
Result Value Ref Range Glucose (POC) 205 (H) 65 - 100 mg/dL Performed by [employer] AG SCREEN, GROUP A Collection Time: 11/24/20 10:15 AM  
 Specimen: Throat Result Value Ref Range Group A Strep Ag ID Negative CBC WITH AUTOMATED DIFF Collection Time: 11/24/20 10:25 AM  
Result Value Ref Range WBC 4.7 3.6 - 11.0 K/uL  
 RBC 3.56 (L) 3.80 - 5.20 M/uL HGB 9.9 (L) 11.5 - 16.0 g/dL HCT 32.9 (L) 35.0 - 47.0 % MCV 92.4 80.0 - 99.0 FL  
 MCH 27.8 26.0 - 34.0 PG  
 MCHC 30.1 30.0 - 36.5 g/dL  
 RDW 17.6 (H) 11.5 - 14.5 % PLATELET 050 888 - 938 K/uL MPV 9.6 8.9 - 12.9 FL  
 NEUTROPHILS 67 32 - 75 % LYMPHOCYTES 21 12 - 49 % MONOCYTES 11 5 - 13 % EOSINOPHILS 1 0 - 7 % BASOPHILS 0 0 - 1 % IMMATURE GRANULOCYTES 0 0.0 - 0.5 % ABS. NEUTROPHILS 3.1 1.8 - 8.0 K/UL  
 ABS. LYMPHOCYTES 1.0 0.8 - 3.5 K/UL  
 ABS. MONOCYTES 0.5 0.0 - 1.0 K/UL  
 ABS. EOSINOPHILS 0.0 0.0 - 0.4 K/UL  
 ABS. BASOPHILS 0.0 0.0 - 0.1 K/UL  
 ABS. IMM. GRANS. 0.0 0.00 - 0.04 K/UL  
 DF AUTOMATED METABOLIC PANEL, COMPREHENSIVE Collection Time: 11/24/20 10:25 AM  
Result Value Ref Range Sodium 135 (L) 136 - 145 mmol/L Potassium 5.4 (H) 3.5 - 5.1 mmol/L Chloride 98 97 - 108 mmol/L  
 CO2 33 (H) 21 - 32 mmol/L Anion gap 4 (L) 5 - 15 mmol/L Glucose 188 (H) 65 - 100 mg/dL BUN 40 (H) 6 - 20 mg/dL Creatinine 2.41 (H) 0.55 - 1.02 mg/dL BUN/Creatinine ratio 17 12 - 20 GFR est AA 24 (L) >60 ml/min/1.73m2 GFR est non-AA 20 (L) >60 ml/min/1.73m2 Calcium 9.2 8.5 - 10.1 mg/dL Bilirubin, total 0.6 0.2 - 1.0 mg/dL AST (SGOT) 15 15 - 37 U/L  
 ALT (SGPT) 10 (L) 12 - 78 U/L Alk. phosphatase 127 (H) 45 - 117 U/L Protein, total 7.5 6.4 - 8.2 g/dL Albumin 3.2 (L) 3.5 - 5.0 g/dL Globulin 4.3 (H) 2.0 - 4.0 g/dL A-G Ratio 0.7 (L) 1.1 - 2.2    
TROPONIN I Collection Time: 11/24/20 10:25 AM  
Result Value Ref Range Troponin-I, Qt. <0.05 <0.05 ng/mL BNP Collection Time: 11/24/20 10:25 AM  
Result Value Ref Range NT pro-BNP 11,403 (H) <125 pg/mL PROTHROMBIN TIME + INR Collection Time: 11/24/20 10:25 AM  
Result Value Ref Range Prothrombin time 14.5 11.9 - 14.7 sec  INR 1.1 0.9 - 1. 1 PTT Collection Time: 11/24/20 10:25 AM  
Result Value Ref Range aPTT 40.7 (H) 23.0 - 35.7 sec  
 aPTT, therapeutic range   68 - 109 sec PROCALCITONIN Collection Time: 11/24/20 10:25 AM  
Result Value Ref Range Procalcitonin 0.06 (H) 0 ng/mL LACTIC ACID Collection Time: 11/24/20 10:25 AM  
Result Value Ref Range Lactic acid 0.7 0.4 - 2.0 mmol/L  
TSH 3RD GENERATION Collection Time: 11/24/20 10:25 AM  
Result Value Ref Range TSH 3.26 0.36 - 3.74 uIU/mL MAGNESIUM Collection Time: 11/24/20 10:25 AM  
Result Value Ref Range Magnesium 1.8 1.6 - 2.4 mg/dL INFLUENZA A & B AG (RAPID TEST) Collection Time: 11/24/20 10:25 AM  
Result Value Ref Range Influenza A Antigen Negative Negative Influenza B Antigen Negative Negative LIPASE Collection Time: 11/24/20 10:25 AM  
Result Value Ref Range Lipase 114 73 - 393 U/L  
URINALYSIS W/ REFLEX CULTURE Collection Time: 11/24/20 11:50 AM  
 Specimen: Urine Result Value Ref Range Color Yellow/Straw Appearance Clear Clear Specific gravity 1.009 1.003 - 1.030    
 pH (UA) 7.0 5.0 - 8.0 Protein 100 (A) Negative mg/dL Glucose 50 (A) Negative mg/dL Ketone Negative Negative mg/dL Bilirubin Negative Negative Blood Negative Negative Urobilinogen 0.1 0.1 - 1.0 EU/dL Nitrites Negative Negative Leukocyte Esterase Negative Negative UA:UC IF INDICATED Culture not indicated by UA result Culture not indicated by UA result WBC 0-4 0 - 4 /hpf  
 RBC 0-5 0 - 5 /hpf Bacteria Negative Negative /hpf Radiologic Studies -  
XR CHEST SNGL V Final Result Impression:   
Persistent perihilar and lower lobe airspace opacities, improved since the prior  
exam, could represent infection or edema in the appropriate clinical setting. Trace left pleural effusion. CT NECK SOFT TISSUE WO CONT    (Results Pending) CT CHEST WO CONT    (Results Pending) CT Results  (Last 48 hours) None CXR Results  (Last 48 hours)  
          
 11/24/20 1023  XR CHEST SNGL V Final result Impression:  Impression:   
Persistent perihilar and lower lobe airspace opacities, improved since the prior  
exam, could represent infection or edema in the appropriate clinical setting. Trace left pleural effusion. Narrative:  Clinical history: Fever Comparison: Chest radiograph 11/14/2020. Findings:   
Single view chest. Multiple surgical clips project over the medial right upper  
extremity. A vascular stent projects over the upper right hemithorax. The lungs  
are adequately expanded. There are patchy perihilar and lower lobe airspace  
opacities which are improved compared to the prior exam. Trace left pleural  
effusion. No pneumothorax. Cardiac silhouette is normal in size. Osseous  
structures are unchanged. Medical Decision Making and ED Course I am the first provider for this patient. I reviewed the vital signs, available nursing notes, past medical history, past surgical history, family history and social history. Vital Signs-Reviewed the patient's vital signs. Patient Vitals for the past 12 hrs: 
 Temp Pulse Resp BP SpO2  
11/24/20 1225  81 20 (!) 176/74 100 % 11/24/20 1225  82  (!) 176/74   
11/24/20 1042 99.9 °F (37.7 °C) 72 24 (!) 171/76 100 % 11/24/20 0946 99.9 °F (37.7 °C) 80 24 (!) 169/73 94 % EKG interpretation:  
EKG at 1058. Normal sinus rhythm rate of 76. No ST changes. No T wave inversions. Normal intervals. Reason rule out dysrhythmia. Interpreted by ER physician. Records Reviewed: Previous Hospital chart. EMS run report ED Course:  
Initial assessment performed. The patients presenting problems have been discussed, and they are in agreement with the care plan formulated and outlined with them. I have encouraged them to ask questions as they arise throughout their visit. Orders Placed This Encounter  CULTURE, BLOOD, PAIRED Standing Status:   Standing Number of Occurrences:   1  INFLUENZA A & B AG (RAPID TEST) Standing Status:   Standing Number of Occurrences:   1  
 STREP AG SCREEN, GROUP A Standing Status:   Standing Number of Occurrences:   1  XR CHEST SNGL V Standing Status:   Standing Number of Occurrences:   1 Order Specific Question:   Transport Answer:   BED [2] Order Specific Question:   Reason for Exam  
  Answer:   fever  CT NECK SOFT TISSUE WO CONT Standing Status:   Standing Number of Occurrences:   1 Order Specific Question:   Transport Answer:   Almartina Irons [5] Order Specific Question:   Reason for Exam  
  Answer:   Lymphoma obstructing airway  CT CHEST WO CONT Standing Status:   Standing Number of Occurrences:   1 Order Specific Question:   Transport Answer:   Alonnicolasa Irons [5] Order Specific Question:   Reason for Exam  
  Answer:   Pneumonia  CBC WITH AUTOMATED DIFF Standing Status:   Standing Number of Occurrences:   1  METABOLIC PANEL, COMPREHENSIVE Standing Status:   Standing Number of Occurrences:   1  TROPONIN I Standing Status:   Standing Number of Occurrences:   1  BNP Standing Status:   Standing Number of Occurrences:   1  PROTHROMBIN TIME + INR Standing Status:   Standing Number of Occurrences:   1  
 PTT Standing Status:   Standing Number of Occurrences:   1  URINALYSIS W/ REFLEX CULTURE Standing Status:   Standing Number of Occurrences:   1  PROCALCITONIN Standing Status:   Standing Number of Occurrences:   1  
 LACTIC ACID Standing Status:   Standing Number of Occurrences:   1  TSH 3RD GENERATION Standing Status:   Standing Number of Occurrences:   1  MAGNESIUM Standing Status:   Standing Number of Occurrences:   1  
 LIPASE Standing Status:   Standing   Number of Occurrences:   1  
 SARS-COV-2 Abbott Test per [facility] Standing Status:   Standing Number of Occurrences:   1 Order Specific Question:   Specimen source Answer:   Cheyenne Marielena Order Specific Question:   Is this test for diagnosis or screening? Answer:   Diagnosis of ill patient Order Specific Question:   Symptomatic for COVID-19 as defined by CDC? Answer:   Yes Order Specific Question:   Date of Symptom Onset Answer:   11/24/2020 Order Specific Question:   Hospitalized for COVID-19? Answer:   No  
  Order Specific Question:   Admitted to ICU for COVID-19? Answer:   No  
  Order Specific Question:   Employed in healthcare setting? Answer:   No  
  Order Specific Question:   Resident in a congregate (group) care setting? Answer:   No  
  Order Specific Question:   Pregnant? Answer:   No  
  Order Specific Question:   Previously tested for COVID-19? Answer: Yes  
 NOTIFY PROVIDER: SPECIFY Notify provider on pt's arrival to floor ONE TIME STAT Standing Status:   Standing Number of Occurrences:   1 Order Specific Question:   Please describe the test or procedure you would like to order. Answer:   Notify provider on pt's arrival to floor  Droplet Plus Isolation Standing Status:   Standing Number of Occurrences:   1  EKG, 12 LEAD, INITIAL Standing Status:   Standing Number of Occurrences:   1 Order Specific Question:   Reason for Exam: Answer:   Jackie Sunny  sodium chloride 0.9 % bolus infusion 1,000 mL  vancomycin (VANCOCIN) 1,000 mg in 0.9% sodium chloride 250 mL (VIAL-MATE) Order Specific Question:   Antibiotic Indications Answer:   Upper Respiratory Infection  piperacillin-tazobactam (ZOSYN) 3.375 g in 0.9% sodium chloride (MBP/ADV) 100 mL MBP Order Specific Question:   Antibiotic Indications Answer:   Upper Respiratory Infection  furosemide (LASIX) injection 40 mg  
 IP CONSULT TO HOSPITALIST Standing Status:   Standing Number of Occurrences:   1 Order Specific Question:   Reason for Consult: Answer:   TO ADMIT Order Specific Question:   Did you call or speak to the consulting provider? Answer: Yes CONSULTANTS: 
Consults D/w dr stafford for admit 
 
reasoner request transfer for ENT 
 
2:30 PM discussed with ENT at [facility]. They are declining transfer as patient only needs oncology and treatment to strength the tumor. There is no need for transfer for an ENT evaluation. There is no ENT needed at bedside. 2:31 PM 
Hospitalist notified of this change. Provider Notes (Medical Decision Making):  
78-year-old presents with fever. Differential diagnosis includes bacteremia, flu, Covid, pneumonia, UTI. Chest x-ray does show pneumonia. Antibiotics ordered. Diuresis for fluid overload. Patient does have episodes of being hypoxic as well responding well to oxygen. Procedures CRITICAL CARE NOTE : 
12:02 PM 
Amount of Critical Care Time: 35 minutes IMPENDING DETERIORATION -Airway, Respiratory and Cardiovascular ASSOCIATED RISK FACTORS - Hypotension MANAGEMENT- Bedside Assessment and Supervision of Care INTERPRETATION -  Xrays and CT Scan INTERVENTIONS - hemodynamic mngmt CASE REVIEW - Hospitalist 
TREATMENT RESPONSE -Improved PERFORMED BY - Self NOTES   : 
I have spent critical care time involved in lab review, consultations with specialist, family decision- making, bedside attention and documentation. This time excludes time spent in any separate billed procedures. During this entire length of time I was immediately available to the patient . Jackelyn Rodriguez MD 
 
 
 
 
 
 
Disposition Emergency Department Disposition:  Admitted Diagnosis Clinical Impression: 1.  Pneumonia due to infectious organism, unspecified laterality, unspecified part of lung 2. Suspected COVID-19 virus infection 3. Congestive heart failure, unspecified HF chronicity, unspecified heart failure type ([facility] [address] Attestations: 
 
Michael Villareal MD 
 
Please note that this dictation was completed with [employer], the computer voice recognition software. Quite often unanticipated grammatical, syntax, homophones, and other interpretive errors are inadvertently transcribed by the computer software. Please disregard these errors. Please excuse any errors that have escaped final proofreading. Thank you.

## 2020-11-25 PROBLEM — D47.Z1 POST-TRANSPLANT LYMPHOPROLIFERATIVE DISORDER (HCC): Status: ACTIVE | Noted: 2020-01-01

## 2020-11-25 NOTE — ED NOTES
Spoke with Dr. Malissa Arrieta, oncologist with [facility]. Updated him on pt's condition per [facility], primary RN. He is aware she is admitted and COVID-19 POSITIVE. He suggests that the pt get started on a strong dose of steroids, such as 1 mg/kg of solu-medrol or prednisone and that the pt will be seen by his practice while she is in the hospital. Primary nurse updated on conversation.

## 2020-11-25 NOTE — PROGRESS NOTES
Spiritual Care Assessment/Progress Note [address] 
 
 
NAME: Madison Chavez      MRN: [id number] AGE: 69 y.o. SEX: female Bahai Affiliation: Sikh  
Language: English  
 
11/25/2020     Total Time (in minutes): 9 Spiritual Assessment begun in [facility] 7S1 INTENSIVE CARE through conversation with: 
  
    []Patient        [] Family    [] Friend(s) Reason for Consult: Initial/Spiritual assessment, critical care Spiritual beliefs: (Please include comment if needed) 
   [] Identifies with a monica tradition:     
   [] Supported by a monica community:        
   [] Claims no spiritual orientation:       
   [] Seeking spiritual identity:            
   [] Adheres to an individual form of spirituality:       
   [x] Not able to assess:                   
 
    
Identified resources for coping:  
   [] Prayer                           
   [] Music                  [] Guided Imagery 
   [] Family/friends                 [] Pet visits [] Devotional reading                         [] Unknown 
   [] Other:                                          
 
 
Interventions offered during this visit: (See comments for more details) Plan of Care: 
 
 [] Support spiritual and/or cultural needs  
 [] Support AMD and/or advance care planning process    
 [] Support grieving process 
 [] Coordinate Rites and/or Rituals  
 [] Coordination with community clergy [] No spiritual needs identified at this time 
 [] Detailed Plan of Care below (See Comments)  [] Make referral to Music Therapy 
[] Make referral to Pet Therapy    
[] Make referral to Addiction services 
[] Make referral to [facility] 
[] Make referral to Spiritual Care Partner 
[] No future visits requested       
[] Follow up visits as needed Comments: It was noted that Ms Fabiana Hillman was COVID+;  did not enter patient's room for spiritual assessment.  No family was present and nurse, Nelson Pickens, stated she had not talked with any of them today. Received update on patient's status and POC. : Rev. Enriqueta Will. Tamika Roberto; [facility], to contact [phone] call: [phone]

## 2020-11-25 NOTE — PROGRESS NOTES
Problem: Dysphagia (Adult) Goal: *Acute Goals and Plan of Care (Insert Text) Description: Speech Therapy Goals Initiated 11/25/2020 1. Patient will tolerate pureed diet/thin liquids without adverse effects within 7 days. 2. Patient will reinitiate baseline diet without adverse effects within 7 days. Outcome: Progressing Towards Goal 
  
SPEECH LANGUAGE PATHOLOGY BEDSIDE SWALLOW EVALUATION Patient: Anca Pinto (50 y.o. female) Date: 11/25/2020 Primary Diagnosis: Lymphoma ([address] Tawanna Seth Precautions: n/a ASSESSMENT : 
Based on the objective data described below, the patient presents with swallow mechanism that appears to be [facility], as pt with no overt s/s of aspiration nor overt pharyngeal difficulty. However, cam oncerned about energy conservation as pt appears to be short of breath during PO trials (requiring short breaths during oral transit) likely 2/2 COVID-19. Therefore, recommend pureed diet/thin liquids, with consideration of short-term non-oral means of nutrition/hydration/medication if needed for adequate nutrition, as concerned pt may not be able to maintain nutrition given fatigue during meals. Suspect swallow function to mirror patient respiratory status. Of note, pt would benefit from objective imaging of the swallow once pt is COVID negative, however, cannot yet complete these as radiology does not have negative pressure suites. Patient will benefit from skilled intervention to address the above impairments. Patients rehabilitation potential is considered to be Guarded PLAN : 
Recommendations and Planned Interventions: 
--Pureed diet/thin liquids 
--Consideration of alternate means of nutrition if fatigue precludes pt from maintaining adequate PO 
--small sips/bites 
--alternate liquids/solids --six small meals a day 
--hold PO when visibly short of breath Frequency/Duration: Patient will be followed by speech-language pathology 2 times a week to address goals. 
Discharge Recommendations: To Be Determined SUBJECTIVE:  
Patient stated, \"It doesn't. ..bother me. ..though  re: shortness of breath during PO, as she was taking shallow breaths between words. OBJECTIVE:  
 
Past Medical History:  
Diagnosis Date Chronic kidney disease Diabetes ([facility] Heart failure ([facility] Hypertension Kidney disease Sleep apnea Throat cancer ([facility] Past Surgical History:  
Procedure Laterality Date HX CHOLECYSTECTOMY HX RENAL TRANSPLANT Diet prior to admission: Regular diet/thin liquids Current Diet:  NPO Cognitive and Communication Status: 
Neurologic State: Alert Orientation Level: Oriented X4 Cognition: Follows commands Perception: Appears intact Perseveration: No perseveration noted Safety/Judgement: Awareness of environment Oral Assessment: 
Oral Assessment Labial: No impairment Dentition: Natural 
Oral Hygiene: oral mucosa moist and clear of secretions Lingual: No impairment Velum: No impairment Mandible: No impairment P.O. Trials: 
Patient Position: upright in bed Vocal quality prior to P.O.: Breathy Consistency Presented: Thin liquid;Puree How Presented: SLP-fed/presented;Cup/sip;Straw;Spoon Bolus Acceptance: No impairment Bolus Formation/Control: Impaired Type of Impairment: Delayed Propulsion: Discoordination Oral Residue: None Initiation of Swallow: No impairment Laryngeal Elevation: Functional 
Aspiration Signs/Symptoms: None Pharyngeal Phase Characteristics: No impairment, issues, or problems Oral Phase Severity: Other (comment) Pharyngeal Phase Severity : Other (comment)(no significant dysphagia but suspect poor energy conservatio) NOMS:  
The NOMS functional outcome measure was used to quantify this patient's level of swallowing impairment. Based on the NOMS, the patient was determined to be at level 6 for swallow function NOMS Swallowing Levels: 
Level 1 (CN): NPO Level 2 (CM): NPO but takes consistency in therapy Level 3 (CL): Takes less than 50% of nutrition p.o. and continues with nonoral feedings; and/or safe with mod cues; and/or max diet restriction Level 4 (CK): Safe swallow but needs mod cues; and/or mod diet restriction; and/or still requires some nonoral feeding/supplements Level 5 (CJ): Safe swallow with min diet restriction; and/or needs min cues Level 6 (CI): Independent with p.o.; rare cues; usually self cues; may need to avoid some foods or needs extra time Level 7 (CH): Independent for all p.o. HELEN. (2003). National Outcomes Measurement System (NOMS): Adult Speech-Language Pathology User's Guide. Pain: 
Pain Scale 1: Numeric (0 - 10) Pain Intensity 1: 0 After treatment:  
Call bell within reach and Nursing notified COMMUNICATION/EDUCATION:  
 
The patient's plan of care including recommendations, planned interventions, and recommended diet changes were discussed with: Registered nurse and NP . Patient/family have participated as able in goal setting and plan of care. Thank you for this referral. 
Christel Overall, SLP Time Calculation: 14 mins

## 2020-11-25 NOTE — PROGRESS NOTES
Negative hepatitis B sAg noted. Rituximab signed. Would favor treatment as soon as possible given life-threatening nature of her illness.  
Alisha Cheng [phone]

## 2020-11-25 NOTE — PROGRESS NOTES
0245: Pt received from ED. A&Ox4, 4L NC, elevated bp. VICKERS, hoarse voice, weak cough, no distress noted. Primary Nurse Gretchen Phan RN and Martha Solorzano., RN performed a dual skin assessment on this patient Impairment noted- see wound doc flow sheet Bi score is 15. Gluteal cleft tears, wound care consult ordered.

## 2020-11-25 NOTE — PROGRESS NOTES
SLP Contact Note SLP evaluation complete. Pt swallow mechanism appears to be [facility], however, concerned about energy conservation as pt appears to be short of breath likely 2/2 COVID-19. Therefore, recommend pureed diet/thin liquids, with consideration of short-term non-oral means of nutrition/hydration/medication if needed for adequate nutrition. Full note to follow. Thank you, Milla Henriquez M.Ed, CCC-SLP Speech-Language Pathologist

## 2020-11-25 NOTE — WOUND CARE
WOCN Note:  
 
New consult placed for gluteal cleft assessment. Patient in room 7104. Covid + Discussed with Lynn Aiken. Chart reviewed. Admitted DX:  Lymphoma ([facility] Kirby Chatterjee Past Medical History:  
Past Medical History:  
Diagnosis Date  Chronic kidney disease  Diabetes ([facility]  Heart failure ([facility]  Hypertension  Kidney disease  Sleep apnea  Throat cancer ([facility] Assessment:  
Bed:  intouch florence isolibrium gel; requires assistance of 2 to reposition. Per Miriam Price RN report the patient is incontinent and has a Pure wick. She reports that the Gluteal cleft has 100% red skipped open areas that are present on admission. Suspect the injury is moisture related. Wound, Pressure Prevention & Skin Care Recommendations: 1. Minimize layers of linen/pads under patient to optimize support surface. 2.  Turn/reposition approximately every 2 hours and offload heels. 3.  Manage moisture/ Keep skin folds clean and dry. 4.  Gluteal cleft:  Cleanse and apply Desitin Q8 and with each incontinence episode. Discussed above plan with Lynn Aiken. Transition of Care: Plan to follow as needed while admitted to hospital. 
 
Charles Opitz, SUEN RN [facility] [place] In[facility] Available on Perfect Serve Pager [phone] Office [phone]

## 2020-11-25 NOTE — CONSULTS
[address] Name:  Felicitas Brar 
MR#:  [id number] :  1950 ACCOUNT #:  [de-identified] DATE OF SERVICE:  2020 REASON FOR CONSULTATION:  ESRD, status post renal transplant. HISTORY OF PRESENT ILLNESS:  This is a 50-year-old female, recently diagnosed with COVID. Her room is not entered. The consultation is done remotely. She actually was seen earlier today by Dr. Kalyn Garza, the local nephrologist, from whence the patient was sent. PAST MEDICAL HISTORY: 
1. Diffuse large B-cell lymphoma. 2.  Recent COVID positivity. 3.  End-stage renal disease. 4.  Status post renal transplant. 5.  History of CHF. 6.  Anemia. 7.  Hypertension. 8.  Sleep apnea. MEDICATIONS:  Current inpatient medications of note include: 1. Amlodipine 10 mg daily. 2.  Aspirin. 3.  Lovenox. 4.  Insulin. 5.  Methylprednisolone 40 mg IV b.i.d. 6.  Metoprolol 50 mg b.i.d. 
7.  Mycophenolate 250 mg at bedtime. 8.  Protonix 40 mg daily. 9.  Piperacillin/tazobactam. 
10.  Prograf 1 mg p.o. b.i.d. 11.  Normal saline solution at 100 mL per hour. This is a 50-year-old white female who actually was seen by my group approximately a month ago. She had undergone her tonsillar mass biopsy. Apparently, she had a port placed, was due to get chemotherapy infused, and as part of her evaluation had a COVID test performed, now found to be positive. She has been subsequently sent to [facility]. 
 
As noted above, she was seen by Dr. Kalyn Garza, who apparently knows the patient reasonably well. She has IFTA/chronic allograft nephropathy, stage IV chronic kidney disease. He indicated that she was due to start chemotherapy this week. She had been with some sore throat and spitting up blood and some shortness of breath. She was using a facemask.   She had been hospitalized or seen at the [facility] several days ago with shortness of breath and had been treated with diuretics at that time, her mycophenolate dose had been decreased to 250 mg b.i.d. She was continued on her tacrolimus. His note indicated that a further reduction in the dose of mycophenolate was planned, and indeed, she is currently on 250 mg at bedtime as prescribed this evening. During her hospital stay in 10/2020, serum creatinine values noted was around 1.80 mg/dL. Subsequently on , 2.43 and this morning when obtained, creatinine was measured at 2.41 mg/dL suggesting relative stability. PHYSICAL EXAMINATION:  Largely deferred. VITAL SIGNS:  Include the following:  Blood pressure is 185/73, pulse 71, respirations 21, and 97% saturation on 2 L nasal cannula. The exam is deferred as noted above. LABORATORY DATA:  Note, these were obtained approximately 11 hours ago; sodium 135, potassium 5.4, chloride and bicarb 98 and 33. The BUN and creatinine of 40 and 2.41. Her calcium is 9.2, magnesium is 1.8. White blood cell count was 4.7, hematocrit 32.9, platelet count 265,574. TSH 3.26. DIAGNOSTIC DATA:  CT scan of the chest showed some compressive atelectasis in the posterior lower lobe of the left lung, pleural effusions, some suspected pulmonary edema. There was a subpleural left upper lobe nodule and some supraclavicular mediastinal adenopathy. Urinalysis done at 11:50 today, pH of 7, specific gravity of 1.009, positive protein, positive glucose, negative blood. ASSESSMENT AND PLAN:  The patient has a B-cell lymphoma, planned for chemotherapy. She is coronavirus disease positive. Her immunosuppression has been reduced somewhat, mycophenolate dose currently at 250 mg at bedtime. She continues on tacrolimus. Her serum creatinine seems to be relatively stable overall. Her chest x-ray suggests some element of volume overload, so would be cautious in the judicious use of intravenous fluids in this patient. At this point, we will do the followin. Follow with you. 2.  Prograf levels q.a.m. 3.  Continue with reduced immunosuppression. My partners will follow up in the a.m. Bashir Jewell MD 
 
 
[id number] 
D:  11/24/2020 21:07 
T:  11/25/2020 0:42 JOB #:  [id number]

## 2020-11-25 NOTE — CONSULTS
Patient seen, chart reviewed, note dictated. [id number] 
 
70 y/o woman s/p renal transplant, now diagnosed with post-transplant lymphoproliferative disorder (PTLD--DLBCL, JUSTICE unclear, unclear if monomorphic/polymorphic). Transferred here for difficulty breathing during placement of port. Screened for COVID found to be positive. Seen by ENT who noted right posterior OP mass, but no obstruction. 1. PTLD/DLBCL: case d/w Dr. Kathy Locke, my partner. Given her recent COVID diagnosis, she is at higher risk for complications from traditional chemotherapy. This disorder is manifested by immunosuppression and her immunosuppression has already been reduced. The standard of care includes both single agent rituximab and rituximab/chemotherapy. Given single agent rituximab will likely have some activity in this b-cell disorder, but minimal reduction in her t-cell function (so as not to reduce her ability to fight off the coronovirus), will start rituximab. I have reached out to both of her daughters who are not immediately available. Will check hepatitis B serologies, HIV, ask path to do JUSTICE on tissue (JUSTICE neg in 30% of PTLD), check uric acid, start allopurinol. There is likely a window of opportunity that we would be missing if we waited to start treatment of her cancer until her COVID was resolved. Pieter Fong MD 
Hem/Onc 
[phone]

## 2020-11-25 NOTE — CONSULTS
[address] Name:  Genet Red 
MR#:  [id number] :  1950 ACCOUNT #:  [de-identified] DATE OF SERVICE:  2020 HEMATOLOGY/ONCOLOGY CONSULT REASON FOR ADMISSION:  Shortness of breath. REASON FOR CONSULTATION:  Posttransplant lymphoproliferative disorder. HISTORY OF PRESENT ILLNESS:  The patient is a 27-year-old woman who is status post renal transplant years ago. She is followed closely by the [facility] on immunosuppression. Unfortunately, she was diagnosed recently with diffuse large B-cell lymphoma involving the oropharynx. CT/PET scan revealed FDG avid disease in the oropharynx, posterior nasopharynx, cervical lymph nodes, supraclavicular lymph nodes, upper mediastinal lymph nodes. FDG avid nodule on the left upper lobe. She underwent a biopsy on 10/27/2020 of the right tonsil. This was submitted to [facility] laboratory and extension number [id number] and was read out as a CD20 B-cell lymphoma, Ki-67 90%, negative for CD10, CD5, CD30, B-cell 6. JUSTICE not done. The patient has been followed closely by Dr. Qing Gomes, my partner and the plan was to give her rituximab, mini-CHOP as well as to reduce her immunosuppression. Unfortunately, when she had a port placed yesterday, she had an episode of shortness of breath and was transferred here for further evaluation and treatment. Currently, she is in the ICU. She is on nasal cannula oxygen and is breathing comfortably. Screening labs on admission found her to be COVID positive. She is awake and alert and is able to tell me the year and her location. She explains that she do not have any next of kin. Next of kin listed on her emergency contacts, two daughters, [address] and [place]. Calling their number sends both to voicemail. Currently, she is complaining of hunger and thirst, but denies any pain and denies any shortness of breath.  
 
PAST MEDICAL HISTORY:  Kidney transplant in 2005 at [address] [place] [address], coronary artery disease status post stent of unclear vessel, hypertension, hyperlipidemia, type 2 diabetes and GERD. SOCIAL HISTORY:  She lives in [place] with her . She is a never smoker. She is retired. She does not use alcohol. FAMILY HISTORY:  Reviewed and noncontributory. REVIEW OF SYSTEMS: 
GENERAL:  No fevers or chills. HEENT:  No auditory or visual change. LYMPHATIC:  No lumps or bumps in neck, underarm, or groin. CARDIOVASCULAR:  No chest pain or palpitations. PULMONARY:  No cough or shortness of breath. GASTROINTESTINAL:  No abdominal pain, diarrhea, or constipation. GENITOURINARY:  No dysuria or incontinence. SKIN:  No rash or changing mole. MUSCULOSKELETAL:  No red or swollen joints. NEUROLOGIC:  No irritability or syncope. PHYSICAL EXAMINATION: 
GENERAL:  Awake and alert. VITAL SIGNS:  /64, pulse 66, saturating 100% on nasal cannula. HEENT:  Sclerae anicteric. Oropharynx clear. NECK:  Supple. There is bulky lymphadenopathy in the right supraclavicular fossa. I could not appreciate any anterior cervical lymphadenopathy. LUNGS:  Clear to auscultation bilaterally. HEART:  Regular rate and rhythm without murmur, rub, or gallop. ABDOMEN:  Nontender and nondistended. Normoactive bowel sounds. No hepatosplenomegaly. She does have a large midline surgical scar with an incisional hernia. EXTREMITIES:  1+ pitting edema bilaterally. LABORATORY DATA:  Uric acid in our office 9.0. Creatinine here 2.12. Hemoglobin 10.3, platelet count 020, white count 3.1. DIAGNOSTIC DATA:  CT scan, I personally reviewed the image of the CT scan of her neck and chest making note of bulky oropharyngeal mass along with right supraclavicular lymphadenopathy, also note was made of a pleural effusion on the left side with compressive atelectasis and a pulmonary nodule.  
 
ASSESSMENT AND PLAN:  A 50-year-old woman status post renal transplant, now diagnosed with posttransplant lymphoproliferative disorder (diffuse large B-cell lymphoma, JUSTICE unclear, unclear if monomorphic or polymorphic) transferred here for difficulty breathing during port placement. Screened for COVID and found to be positive. Seen by ENT, who noted right posterior oropharyngeal mass, but no obstruction. 1.  Posttransplant lymphoproliferative disorder/diffuse large B-cell lymphoma. Case was discussed with Dr. Bre Shi, my partner. Given her recent COVID diagnosis, she is at high risk for complications from traditional chemotherapy. This disorder is manifested by immunosuppression and is caused by immunosuppression. Her immunosuppression has already been reduced. The standard of care includes both single-agent rituximab and combination rituximab chemotherapy. Given single-agent rituximab will likely have some activity in the B-cell disorder, but minimal reduction in her T-cell function, so as not to reduce ability to fight off the coronavirus, we will start single-agent rituximab. I reached out to both of her daughters, who are not immediately available. We will check hepatitis B serology and HIV as pathology to do JUSTICE testing on tissue. Check uric acid. Start allopurinol. Please note, there is a likely a window of opportunity, we will be missing if we waited to start treatment of her cancer until her COVID resolved given the fast growth of this tumor and the mass in her oropharynx. Thank you for the consult. MD BRIAN Clark/[email or web address] 
D:  11/25/2020 9:12 
T:  11/25/2020 12:12 JOB #:  [id number]

## 2020-11-25 NOTE — PROGRESS NOTES
69year old patient with diffuse large B cell lymphoma known to my group preparing for treatment and now admitted to hospital, covid +, for a port and observation due to oropharyngeal mass. She also has positive findings on her chest CT (report reviewed). I have reviewed her neck CT images and report. R posterior oropharyngeal mass noted but not obstructive at this time. The sooner treatment can begin, the better. Manipulation of her airway is not advised unless urgent. This oropharyngeal mass is not obstructive and could be bypassed by a nasal cannula or oral airway if needed. If intubation were needed for any reason, fiberoptic or glide type scope would be recommended.

## 2020-11-25 NOTE — PROGRESS NOTES
[facility] 
 
ICU TEAM Progress Note Name: Ebenezer Harmon : 1950 MRN: [id number] Date: 2020 Reason for ICU Admission: Neck mass with impending airway compromise HPI: 
This is a 69-year-old female with multiple medical problem was recently diagnosed with diffuse large cell lymphoma late 2020 after tonsillar biopsy. She did not start treatment yet. For the last few days she noticed intermittent chills and fever. She also noticed some occasional shortness of breath and cough associated with minimal blood mixed with the phlegm. The bleeding is not progressive and she has not seen it in a few days now. No nausea no vomiting though she admits that her oral intake been decreasing and she is feeling overall tired. 2020 she went to her local hospital to get port inserted to start her chemotherapy where they found her to be febrile ,Covid testing was positive and CAT scan of the neck showed worsening of the compromise of the airway so ENT was contacted from that ER and he recommended transferring care to our hospital for further evaluation. At the time of initial examination patient  lying in bed not in distress, hoarse voice but according to her not much changed, able to swallow her secretions no drooling. Her main complaint is generalized fatigue and some pain in her throat which been there for a while. Active Problem List:  
 
Problem List  Date Reviewed: 2020 Codes Class Lymphoma ([facility] [address] ICD-10-CM: C85.90 ICD-9-CM: 202.80 CHF (congestive heart failure) (HCC) ICD-10-CM: I50.9 ICD-9-CM: 428.0 Tonsillar mass ICD-10-CM: J35.8 ICD-9-CM: 474.8 Past Medical History:  
 
 has a past medical history of Chronic kidney disease, Diabetes ([address] Heart failure ([address] Hypertension, Kidney disease, Sleep apnea, and Throat cancer ([address]  
 
Past Surgical History:  
 
 has a past surgical history that includes hx cholecystectomy and hx renal transplant. Home Medications:  
 
Prior to Admission medications Medication Sig Start Date End Date Taking? Authorizing Provider  
mycophenolate mofetil (CELLCEPT) 250 mg capsule Take 2 Caps by mouth Daily (before breakfast). 11/18/20   Marco Kay MD  
mycophenolate mofetil (CELLCEPT) 250 mg capsule Take 1 Cap by mouth nightly. 11/17/20   Janna Chino MD  
phenol throat spray (CHLORASEPTIC) 1.4 % spray Take 1 Spray by mouth three (3) times daily as needed for Sore throat. 11/17/20   Janna Chino MD  
furosemide (LASIX) 40 mg tablet Take 2 Tabs by mouth Daily (before breakfast). 11/17/20   Janna Chino MD  
furosemide (LASIX) 40 mg tablet Take 1 Tab by mouth Daily (before dinner). 11/17/20   Janna Chino MD  
zolpidem (Ambien) 5 mg tablet Take 5 mg by mouth nightly. Provider, Historical  
melatonin 3 mg tablet Take 3 mg by mouth nightly. Provider, Historical  
insulin lispro (HumaLOG U-100 Insulin) 100 unit/mL injection 5 Units by SubCUTAneous route Before breakfast, lunch, and dinner. Provider, Historical  
tacrolimus (PROGRAF) 1 mg capsule Take 1 Cap by mouth every twelve (12) hours for 30 days. 10/28/20 11/27/20  Sarah SNOW MD  
amLODIPine (NORVASC) 10 mg tablet Take 10 mg by mouth daily. Provider, Historical  
metoprolol tartrate (LOPRESSOR) 50 mg tablet Take 50 mg by mouth two (2) times a day. Provider, Historical  
calcitRIOL (ROCALTROL) 0.25 mcg capsule Take 0.25 mcg by mouth daily. Provider, Historical  
hydrALAZINE (APRESOLINE) 50 mg tablet Take 50 mg by mouth three (3) times daily. Provider, Historical  
isosorbide mononitrate ER (IMDUR) 60 mg CR tablet Take 60 mg by mouth two (2) times a day. Provider, Historical  
omeprazole (PRILOSEC) 40 mg capsule Take 40 mg by mouth daily. Provider, Historical  
sodium bicarbonate 650 mg tablet Take 1,300 mg by mouth four (4) times daily. Provider, Historical  
gemfibroziL (LOPID) 600 mg tablet Take 600 mg by mouth daily. Provider, Historical  
predniSONE (DELTASONE) 2.5 mg tablet Take 7.5 mg by mouth daily (with breakfast). Provider, Historical  
aspirin delayed-release 81 mg tablet Take 81 mg by mouth daily. Provider, Historical  
cholecalciferol (VITAMIN D3) (2,000 UNITS /50 MCG) cap capsule Take  by mouth two (2) times a day. Provider, Historical  
 
 
Allergies/Social/Family History: Allergies Allergen Reactions  Benadryl [Diphenhydramine Hcl] Other (comments)  Fentanyl Other (comments)  Morphine Unknown (comments)  Versed [Midazolam] Other (comments) Social History Tobacco Use  Smoking status: Never Smoker  Smokeless tobacco: Never Used Substance Use Topics  Alcohol use: Never Frequency: Never Family History Problem Relation Age of Onset  Hypertension Mother Review of Systems:  
 
10 system reviewed and they are negative but as in interval history Objective:  
Vital Signs: 
Visit Vitals BP (!) 158/64 Pulse 66 Temp 99.6 °F (37.6 °C) Resp 16 Wt 66.5 kg (146 lb 9.7 oz) SpO2 100% BMI 25.97 kg/m² O2 Flow Rate (L/min): 4 l/min O2 Device: Nasal cannula Temp (24hrs), Av.7 °F (37.6 °C), Min:98.6 °F (37 °C), Max:100.9 °F (38.3 °C) Intake/Output:  
 
Intake/Output Summary (Last 24 hours) at 2020 5800 Last data filed at 2020 0700 Gross per 24 hour Intake 1090 ml Output 550 ml Net 540 ml Physical Exam: 
 
General:  Alert, cooperative, chronically ill looking Eyes:  Sclera anicteric. Pupils equally round and reactive to light. Mouth/Throat:  Mallampati 4, there is an area but seems to be an old incision no active bleeding no fresh blood Neck: Supple, lymph node appreciated Lungs:   Clear to auscultation bilaterally, good effort CV:  Regular rate and rhythm,no murmur, click, rub or gallop Abdomen:   Soft, non-tender. bowel sounds normal. non-distended, transplanted kidney palpable in left lower quadrant Extremities: No cyanosis or edema Skin: Skin color, texture, turgor normal. no acute rash or lesions Lymph nodes: Cervical and supraclavicular normal  
Musculoskeletal: No swelling or deformity Lines/Devices:  Intact, no erythema, drainage or tenderness Psych: Alert and oriented, normal mood affect given the setting LABS AND  DATA: Personally reviewed Recent Labs  
  11/25/20 
0405 11/24/20 
1025 WBC 3.1* 4.7 HGB 10.3* 9.9*  
HCT 35.1 32.9*  
 239 Recent Labs  
  11/25/20 
0405 11/24/20 
1025  135* K 6.0* 5.4*  
 98 CO2 26 33* BUN 42* 40* CREA 2.12* 2.41* * 188* CA 9.2 9.2 MG  --  1.8 Recent Labs  
  11/25/20 
0405 11/24/20 
1025  127* TP 7.0 7.5 ALB 2.7* 3.2*  
GLOB 4.3* 4.3*  
LPSE  --  114 Recent Labs  
  11/24/20 
1025 INR 1.1 PTP 14.5 APTT 40.7* No results for input(s): PHI, PCO2I, PO2I, FIO2I in the last 72 hours. Recent Labs  
  11/24/20 
1025 TROIQ <0.05 Hemodynamics:  
PAP:   CO:    
Wedge:   CI:    
CVP:    SVR:    
  PVR:    
 
Ventilator Settings: 
Mode Rate Tidal Volume Pressure FiO2 PEEP Peak airway pressure:     
Minute ventilation:     
 
 
MEDS: Reviewed Chest X-Ray: 
CT chest and neck reviewed myself and agree with report as documented CXR Results  (Last 48 hours)  
          
 11/24/20 1023  XR CHEST SNGL V Final result Impression:  Impression:   
Persistent perihilar and lower lobe airspace opacities, improved since the prior  
exam, could represent infection or edema in the appropriate clinical setting. Trace left pleural effusion. Narrative:  Clinical history: Fever Comparison: Chest radiograph 11/14/2020. Findings:   
Single view chest. Multiple surgical clips project over the medial right upper  
extremity.  A vascular stent projects over the upper right hemithorax. The lungs  
are adequately expanded. There are patchy perihilar and lower lobe airspace  
opacities which are improved compared to the prior exam. Trace left pleural  
effusion. No pneumothorax. Cardiac silhouette is normal in size. Osseous  
structures are unchanged. ECHO: November 16, 2020 · LV: Estimated LVEF is 65 - 70%. Normal cavity size and systolic function (ejection fraction normal). Moderate concentric hypertrophy. Wall motion: normal. Mild (grade 1) left ventricular diastolic dysfunction. · LA: Mildly dilated left atrium. Left Atrium volume index is 31 mL/m2. · AV: Aortic valve leaflet calcification present. · MV: Mitral valve thickening. · TV: Mild tricuspid valve regurgitation is present. · PV: Mild pulmonic valve regurgitation is present. · IVC: Moderately elevated central venous pressure (10-15 mmHg); IVC diameter is larger than 21 mm and collapses more than 50% with respiration. Assessment and Plan:  
Diffuse large B-cell lymphoma: Diagnosed in October. Did not receive chemotherapy yet. No coagulopathy or thrombocytopenia. Consult to hematology oncology and appreciate their input. She has an oncologist and [place] but does not seem to be on medical staff in this hospital. Will start rituximab. COVID-19 positive: Pending PCR test.  Pending inflammatory markers though these could be affected by the lymphoma diagnosis. She will be started on steroid. No clear evidence of pneumonia on CAT scan though this may evolve later. At baseline patient uses 3 L nasal cannula. Will consider remdesivir and other therapeutic option if indicated. Started vitamin D, Vitamin C, Zinc, urine for legionella and strep given immunocompromised state. Impending airway compromise: At this time she is able to swallow her secretions, no stridor and able to communicate. ER physician contacted ENT and I appreciate their opinion. Speech evaluation.   
Status post kidney transplant on immunosuppression: Resume CellCept and tacrolimus, will consult nephrology to further adjust this medication giving her lymphoma diagnosis. Acute on chronic kidney disease: IV fluid hydration, appreciate nephrology input COPD: Does not seem to be in acute exacerbation. She is already on steroid. Resume nebulization treatment. Chronic anemia: 
Diabetes mellitus type 2: Sliding scale Hypertension: Continue home medication DVT and GI prophylaxis CRITICAL CARE CONSULTANT NOTE I had a face to face encounter with the patient, reviewed and interpreted patient data including clinical events, labs, images, vital signs, I/O's, and examined patient. I have discussed the case and the plan and management of the patient's care with the consulting services, the bedside nurses and the respiratory therapist.   
 
NOTE OF PERSONAL INVOLVEMENT IN CARE This patient has a high probability of imminent, clinically significant deterioration, which requires the highest level of preparedness to intervene urgently. I participated in the decision-making and personally managed or directed the management of the following life and organ supporting interventions that required my frequent assessment to treat or prevent imminent deterioration. I personally spent 75 minutes of critical care time. This is time spent at this critically ill patient's bedside actively involved in patient care as well as the coordination of care and discussions with the patient's family. This does not include any procedural time which has been billed separately. Carmelo Smart [facility]

## 2020-11-25 NOTE — PROGRESS NOTES
Zosyn dose adjustment Indication:  sepsis Current regimen:  2.25gm q8h Abx regimen: vanc 1gm x 1 dose Recent Labs  
  20 
1025 WBC 4.7 CREA 2.41* BUN 40* Est CrCl: 19 ml/min Temp (24hrs), Av.5 °F (37.5 °C), Min:98.6 °F (37 °C), Max:100.9 °F (38.3 °C) Cultures: pending Plan: Change to 3.375gm q12h with extended infusion time. **close i-vent after initial review. Remove this text prior to posting to note.

## 2020-11-25 NOTE — PROGRESS NOTES
Problem: Breathing Pattern - Ineffective Goal: *Absence of hypoxia Outcome: Progressing Towards Goal 
Goal: *Use of effective breathing techniques Outcome: Progressing Towards Goal

## 2020-11-25 NOTE — CONSULTS
Nephrology Progress Note Janny tSanley Date of Admission : 11/24/2020 CC: Follow up for  Renal Tx Assessment and Plan Renal Transplant: 
- cont current IS meds - MMF, prograf, IV solumedrol - prograf level daily while here 
- daily labs Hyperkalemia: 
- give ca, insulin and bicarb  
- cont IVF for now 
- unable to take po now, will hold on SPS 
- repeat labs this afternoon 
  
CKD IV: 
- baseline Cr appears to be around 2 
- cont IVF as above COVID-19 
  
Diffuse large B cell lymphoma HTN: 
- cont current meds 
 
  
DM2: 
- on insulin Interval History: Not examined in the room Per RN, stable night. Voiding, Cr stable. BP stable. No issues with oxygenation. Having issues with secretions per RN, stable oxygenation. Current Medications: all current  Medications have been eviewed in [facility] Review of Systems: Pertinent items are noted in HPI. Objective: 
Vitals:   
Vitals:  
 11/25/20 0400 11/25/20 0500 11/25/20 0600 11/25/20 0700 BP: (!) 144/71 (!) 163/61 (!) 149/66 (!) 158/64 Pulse: 67 72 72 66 Resp: 16 17 20 16 Temp:      
SpO2: 100% 100% 91% 100% Weight:      
 
Intake and Output: 
No intake/output data recorded. 11/23 1901 - 11/25 0700 In: 4045 [I.V.:1090] Out: 550 [Urine:550] Physical Examination:  Not examined in the room due to COVID-19 infection Pt intubated     No 
General: NAD Resp:  Stable oxygenation CV:  RRR on monitor, no edema Neurologic:  Non focal 
:  No knox []    High complexity decision making was performed 
[]    Patient is at high-risk of decompensation with multiple organ involvement Lab Data Personally Reviewed: I have reviewed all the pertinent labs, microbiology data and radiology studies during assessment. Recent Labs  
  11/25/20 
0405 11/24/20 
1025  135* K 6.0* 5.4*  
 98 CO2 26 33* * 188* BUN 42* 40* CREA 2.12* 2.41* CA 9.2 9.2 MG  --  1.8 ALB 2.7* 3.2* ALT 12 10* INR  --  1.1 Recent Labs  
  11/25/20 
0405 11/24/20 
1025 WBC 3.1* 4.7 HGB 10.3* 9.9*  
HCT 35.1 32.9*  
 239 No results found for: SDES No results found for: CULT Recent Results (from the past 24 hour(s)) GLUCOSE, POC Collection Time: 11/24/20  9:20 AM  
Result Value Ref Range Glucose (POC) 205 (H) 65 - 100 mg/dL Performed by Iban Baig AG SCREEN, GROUP A Collection Time: 11/24/20 10:15 AM  
 Specimen: Throat Result Value Ref Range Group A Strep Ag ID Negative CBC WITH AUTOMATED DIFF Collection Time: 11/24/20 10:25 AM  
Result Value Ref Range WBC 4.7 3.6 - 11.0 K/uL  
 RBC 3.56 (L) 3.80 - 5.20 M/uL HGB 9.9 (L) 11.5 - 16.0 g/dL HCT 32.9 (L) 35.0 - 47.0 % MCV 92.4 80.0 - 99.0 FL  
 MCH 27.8 26.0 - 34.0 PG  
 MCHC 30.1 30.0 - 36.5 g/dL  
 RDW 17.6 (H) 11.5 - 14.5 % PLATELET 839 037 - 762 K/uL MPV 9.6 8.9 - 12.9 FL  
 NEUTROPHILS 67 32 - 75 % LYMPHOCYTES 21 12 - 49 % MONOCYTES 11 5 - 13 % EOSINOPHILS 1 0 - 7 % BASOPHILS 0 0 - 1 % IMMATURE GRANULOCYTES 0 0.0 - 0.5 % ABS. NEUTROPHILS 3.1 1.8 - 8.0 K/UL  
 ABS. LYMPHOCYTES 1.0 0.8 - 3.5 K/UL  
 ABS. MONOCYTES 0.5 0.0 - 1.0 K/UL  
 ABS. EOSINOPHILS 0.0 0.0 - 0.4 K/UL  
 ABS. BASOPHILS 0.0 0.0 - 0.1 K/UL  
 ABS. IMM. GRANS. 0.0 0.00 - 0.04 K/UL  
 DF AUTOMATED METABOLIC PANEL, COMPREHENSIVE Collection Time: 11/24/20 10:25 AM  
Result Value Ref Range Sodium 135 (L) 136 - 145 mmol/L Potassium 5.4 (H) 3.5 - 5.1 mmol/L Chloride 98 97 - 108 mmol/L  
 CO2 33 (H) 21 - 32 mmol/L Anion gap 4 (L) 5 - 15 mmol/L Glucose 188 (H) 65 - 100 mg/dL BUN 40 (H) 6 - 20 mg/dL Creatinine 2.41 (H) 0.55 - 1.02 mg/dL BUN/Creatinine ratio 17 12 - 20 GFR est AA 24 (L) >60 ml/min/1.73m2 GFR est non-AA 20 (L) >60 ml/min/1.73m2 Calcium 9.2 8.5 - 10.1 mg/dL Bilirubin, total 0.6 0.2 - 1.0 mg/dL AST (SGOT) 15 15 - 37 U/L  
 ALT (SGPT) 10 (L) 12 - 78 U/L Alk. phosphatase 127 (H) 45 - 117 U/L Protein, total 7.5 6.4 - 8.2 g/dL Albumin 3.2 (L) 3.5 - 5.0 g/dL Globulin 4.3 (H) 2.0 - 4.0 g/dL A-G Ratio 0.7 (L) 1.1 - 2.2    
TROPONIN I Collection Time: 11/24/20 10:25 AM  
Result Value Ref Range Troponin-I, Qt. <0.05 <0.05 ng/mL BNP Collection Time: 11/24/20 10:25 AM  
Result Value Ref Range NT pro-BNP 11,403 (H) <125 pg/mL PROTHROMBIN TIME + INR Collection Time: 11/24/20 10:25 AM  
Result Value Ref Range Prothrombin time 14.5 11.9 - 14.7 sec INR 1.1 0.9 - 1.1 PTT Collection Time: 11/24/20 10:25 AM  
Result Value Ref Range aPTT 40.7 (H) 23.0 - 35.7 sec  
 aPTT, therapeutic range   68 - 109 sec PROCALCITONIN Collection Time: 11/24/20 10:25 AM  
Result Value Ref Range Procalcitonin 0.06 (H) 0 ng/mL LACTIC ACID Collection Time: 11/24/20 10:25 AM  
Result Value Ref Range Lactic acid 0.7 0.4 - 2.0 mmol/L  
TSH 3RD GENERATION Collection Time: 11/24/20 10:25 AM  
Result Value Ref Range TSH 3.26 0.36 - 3.74 uIU/mL MAGNESIUM Collection Time: 11/24/20 10:25 AM  
Result Value Ref Range Magnesium 1.8 1.6 - 2.4 mg/dL INFLUENZA A & B AG (RAPID TEST) Collection Time: 11/24/20 10:25 AM  
Result Value Ref Range Influenza A Antigen Negative Negative Influenza B Antigen Negative Negative LIPASE Collection Time: 11/24/20 10:25 AM  
Result Value Ref Range Lipase 114 73 - 393 U/L  
URINALYSIS W/ REFLEX CULTURE Collection Time: 11/24/20 11:50 AM  
 Specimen: Urine Result Value Ref Range Color Yellow/Straw Appearance Clear Clear Specific gravity 1.009 1.003 - 1.030    
 pH (UA) 7.0 5.0 - 8.0 Protein 100 (A) Negative mg/dL Glucose 50 (A) Negative mg/dL Ketone Negative Negative mg/dL Bilirubin Negative Negative Blood Negative Negative Urobilinogen 0.1 0.1 - 1.0 EU/dL Nitrites Negative Negative Leukocyte Esterase Negative Negative  UA:UC IF INDICATED Culture not indicated by UA result Culture not indicated by UA result WBC 0-4 0 - 4 /hpf  
 RBC 0-5 0 - 5 /hpf Bacteria Negative Negative /hpf SARS-COV-2 Collection Time: 11/24/20  2:30 PM  
Result Value Ref Range SARS-CoV-2 Please find results under separate order COVID-19 RAPID TEST Collection Time: 11/24/20  2:30 PM  
Result Value Ref Range Specimen source Nasopharyngeal    
 COVID-19 rapid test DETECTED (A) Not Detected PROCALCITONIN Collection Time: 11/24/20  6:59 PM  
Result Value Ref Range Procalcitonin 0.07 ng/mL C REACTIVE PROTEIN, QT Collection Time: 11/24/20  6:59 PM  
Result Value Ref Range C-Reactive protein 3.55 (H) 0.00 - 0.60 mg/dL FERRITIN Collection Time: 11/24/20  6:59 PM  
Result Value Ref Range Ferritin 446 (H) 8 - 252 NG/ML  
D DIMER Collection Time: 11/24/20  6:59 PM  
Result Value Ref Range D-dimer 1.40 (H) 0.00 - 0.65 mg/L FEU  
LACTIC ACID Collection Time: 11/24/20  6:59 PM  
Result Value Ref Range Lactic acid 0.5 0.4 - 2.0 MMOL/L  
GLUCOSE, POC Collection Time: 11/24/20  9:15 PM  
Result Value Ref Range Glucose (POC) 98 65 - 100 mg/dL Performed by Efren Mccarthy GLUCOSE, POC Collection Time: 11/25/20  2:27 AM  
Result Value Ref Range Glucose (POC) 139 (H) 65 - 100 mg/dL Performed by Nicolle morataya RN   
CBC WITH AUTOMATED DIFF Collection Time: 11/25/20  4:05 AM  
Result Value Ref Range WBC 3.1 (L) 3.6 - 11.0 K/uL  
 RBC 3.76 (L) 3.80 - 5.20 M/uL  
 HGB 10.3 (L) 11.5 - 16.0 g/dL HCT 35.1 35.0 - 47.0 % MCV 93.4 80.0 - 99.0 FL  
 MCH 27.4 26.0 - 34.0 PG  
 MCHC 29.3 (L) 30.0 - 36.5 g/dL  
 RDW 16.9 (H) 11.5 - 14.5 % PLATELET 354 381 - 990 K/uL MPV 9.8 8.9 - 12.9 FL  
 NRBC 0.0 0  WBC ABSOLUTE NRBC 0.00 0.00 - 0.01 K/uL NEUTROPHILS 81 (H) 32 - 75 % LYMPHOCYTES 16 12 - 49 % MONOCYTES 3 (L) 5 - 13 % EOSINOPHILS 0 0 - 7 % BASOPHILS 0 0 - 1 %  IMMATURE GRANULOCYTES 0 0.0 - 0.5 % ABS. NEUTROPHILS 2.5 1.8 - 8.0 K/UL  
 ABS. LYMPHOCYTES 0.5 (L) 0.8 - 3.5 K/UL  
 ABS. MONOCYTES 0.1 0.0 - 1.0 K/UL  
 ABS. EOSINOPHILS 0.0 0.0 - 0.4 K/UL  
 ABS. BASOPHILS 0.0 0.0 - 0.1 K/UL  
 ABS. IMM. GRANS. 0.0 0.00 - 0.04 K/UL  
 DF SMEAR SCANNED    
 PLATELET COMMENTS Large Platelets RBC COMMENTS ANISOCYTOSIS 1+ 
    
 RBC COMMENTS MACROCYTOSIS 
1+ METABOLIC PANEL, COMPREHENSIVE Collection Time: 11/25/20  4:05 AM  
Result Value Ref Range Sodium 136 136 - 145 mmol/L Potassium 6.0 (H) 3.5 - 5.1 mmol/L Chloride 103 97 - 108 mmol/L  
 CO2 26 21 - 32 mmol/L Anion gap 7 5 - 15 mmol/L Glucose 141 (H) 65 - 100 mg/dL BUN 42 (H) 6 - 20 MG/DL Creatinine 2.12 (H) 0.55 - 1.02 MG/DL  
 BUN/Creatinine ratio 20 12 - 20 GFR est AA 28 (L) >60 ml/min/1.73m2 GFR est non-AA 23 (L) >60 ml/min/1.73m2 Calcium 9.2 8.5 - 10.1 MG/DL Bilirubin, total 0.6 0.2 - 1.0 MG/DL  
 ALT (SGPT) 12 12 - 78 U/L  
 AST (SGOT) 33 15 - 37 U/L Alk. phosphatase 113 45 - 117 U/L Protein, total 7.0 6.4 - 8.2 g/dL Albumin 2.7 (L) 3.5 - 5.0 g/dL Globulin 4.3 (H) 2.0 - 4.0 g/dL A-G Ratio 0.6 (L) 1.1 - 2.2 D DIMER Collection Time: 11/25/20  4:05 AM  
Result Value Ref Range D-dimer 1.28 (H) 0.00 - 0.65 mg/L FEU  
GLUCOSE, POC Collection Time: 11/25/20  6:18 AM  
Result Value Ref Range Glucose (POC) 152 (H) 65 - 100 mg/dL Performed by Ana Copeland MD 
[address] [place], [place] [address]way, [facility] Phone - [phone] Fax - [phone] 
[email or web address]

## 2020-11-25 NOTE — ROUTINE PROCESS
TRANSFER - IN REPORT: 
 
Verbal report received from SPARKLE Marshall(name) on Janny Stanley  being received from ED(unit) for routine progression of care Report consisted of patients Situation, Background, Assessment and  
Recommendations(SBAR). Information from the following report(s) Kardex, MAR and Recent Results was reviewed with the receiving nurse. Opportunity for questions and clarification was provided. Assessment completed upon patients arrival to unit and care assumed.

## 2020-11-25 NOTE — PROGRESS NOTES
0730 Bedside and Verbal shift change report given to PANKAJ HOPKINS RN (oncoming nurse) by SPARKLE GARCIA (offgoing nurse). Report included the following information SBAR, Kardex, ED Summary, Intake/Output, MAR, Accordion, Recent Results, Med Rec Status, Cardiac Rhythm NSR and Alarm Parameters . 1115 This RN calling lab to find out who to call pathology consult too. Transferred to pathology. Pathology will send add on tests that Dr. Maxim Choudhury wanted. 1930 Bedside and Verbal shift change report given to RAFA SUN RN (oncoming nurse) by PANKAJ HOPKINS RN (offgoing nurse). Report included the following information SBAR, Kardex, ED Summary, Procedure Summary, Intake/Output, MAR, Accordion, Recent Results, Med Rec Status, Cardiac Rhythm NSR and Alarm Parameters .

## 2020-11-25 NOTE — PROGRESS NOTES
Comprehensive Nutrition Assessment Type and Reason for Visit: Initial 
 
Nutrition Recommendations/Plan: If feeding tube is placed, recommend initiate Vital 1.2@ 10mL/hr, increase by 10mL/q4hr until goal rate of 60mL/hr achieved and flush with 100mL q4hr. Hold TF for 1hr prior to and 2hr after CellCept admin. Monitor electrolytes for possible refeeding. Supplement with 100mg of B1 and [place] MVI daily. Bowel regimen. Nutrition Assessment:   
Pt admitted for neck mass with impending airway compromise. Past Medical History:  
Diagnosis Date  Chronic kidney disease - stage IV  Diabetes ([address]  Heart failure ([address]  Hypertension  Kidney disease  Sleep apnea  Throat cancer ([address] Pt immunosuppressed due to renal transplant ('05) and large B-cell lymphoma. COVID+. Transferred from [place] [facility]. H&P from [facility] reviewed. On 11/11, pt reported a 40lb weight loss in 6 months. GI paperwork from 9/29 mentioned previous weight at 174.8, but does not specify a date of that weight. Pt also reported declining appetite prior to admission. Elevated BUN and Cr noted. High potassium noted. Pt will be at risk for refeeding, monitor lytes, supplement with B1 and MVI. Oropharyngeal mass not obstructive and could by bypassed as needed according to otolaryngology. SLP notes patient will most likely need to receive adequate nutrition/hydration other than PO due to concerns about energy conservation. If feeding tube is placed, recommend initiate Vital 1.2@ 10mL/hr, continuous with holds for CellCept bid, increase by 10mL/q4hr until goal rate of 60mL/hr achieved and flush with 100mL q4hr. Goal rate provides 1080mL, 1296kcal, 81g protein, 1476mL free water (876 from formula + 600 from flushes). Malnutrition Assessment: 
Malnutrition Status: At risk for malnutrition (specify)(weight loss of -40lbs reported) Context:  Chronic illness Nutritionally Significant Medications: allopurinol, amlodipine, vit. C (1000mg/d), vit. D3 (2000U/d), Lovenox, Humalog, labetol, pantoprazole, Zosyn, zinc 220 [50] mg 
 
Estimated Daily Nutrient Needs: 
Energy (kcal): [phone]([id number]); Weight Used for Energy Requirements: Current Protein (g): (1.2-1.5g/kg); Weight Used for Protein Requirements: Current Fluid (ml/day): 1500(min. intake for >64yo); Method Used for Fluid Requirements: Other (see comment) Nutrition Related Findings:      
BM: PTA Edema: None Wounds:  Multiple, Skin tears, Open wounds, Wound consult pending Current Nutrition Therapies:  
Diet: NPO Additional Caloric Sources: None Meal intake: No data found. Anthropometric Measures: 
· Height:  5' 3\" (160 cm) · Current Body Wt:  66.5 kg (146 lb 9.7 oz) · Admission Body Wt:  146 lb 9.7 oz · Usual Body Wt:      185 lb (83.9kg) · Ideal Body Wt: 115lb  :  127.5 % · BMI Categories:  Overweight (BMI 25.0-29. 9) Wt Readings from Last 10 Encounters:  
11/25/20 66.5 kg (146 lb 9.7 oz)  
11/24/20 66.2 kg (146 lb)  
11/16/20 67.5 kg (148 lb 12.3 oz)  
11/12/20 66.7 kg (147 lb) 10/28/20 64.4 kg (141 lb 15.6 oz) 10/26/20 66.2 kg (146 lb) 10/08/20 72.6 kg (160 lb) 10/08/20 68 kg (150 lb) Nutrition Diagnosis: · Swallowing difficulty related to altered GI structure, acute injury/trauma(biopsy of oropharyngeal mass (swelling + blockage)) as evidenced by NPO or clear liquid status due to medical condition, GI abnormality, swallowing study results, weight loss greater than or equal to 10% in 6 months · Inadequate protein-energy intake, In context of acute illness or injury related to swallowing difficulty, catabolic illness, altered GI structure as evidenced by weight loss greater than or equal to 10% in 6 months, NPO or clear liquid status due to medical condition, poor intake prior to admission Nutrition Interventions:  
Food and/or Nutrient Delivery: Start tube feeding Nutrition Education and Counseling: No recommendations at this time Coordination of Nutrition Care: Continue to monitor while inpatient, Interdisciplinary rounds, Swallow evaluation Goals: 
initiation of PN/EN within 24 hours Nutrition Monitoring and Evaluation:  
Behavioral-Environmental Outcomes: None identified Food/Nutrient Intake Outcomes: Enteral nutrition intake/tolerance, Parenteral nutrition intake/tolerance Physical Signs/Symptoms Outcomes: Biochemical data, Weight Discharge Planning: Too soon to determine Electronically signed by Abrahan Lyman MS, NDTR, Dietetic Intern, on 11/25/2020 at 1:35 PM 
 
Contact: Scotty Georges RD, [facility], via Meta[employer]

## 2020-11-25 NOTE — PROGRESS NOTES
2000: Bedside and Verbal shift change report given to [address] (oncoming nurse) by Jamar Ham RN (offgoing nurse). Report included the following information SBAR, Kardex, ED Summary, Intake/Output, MAR, Recent Results, Cardiac Rhythm SR and Alarm Parameters . 2245: Intensivist notified of SBP > 180 and HR in 50s-60s. One time dose of hydralazine ordered. 0000: Bedside and Verbal shift change [facility] ([facility] by [address] (offgoing nurse). Report included the following information SBAR, Kardex, ED Summary, Intake/Output, MAR, Recent Results, Cardiac Rhythm SR and Alarm Parameters .

## 2020-11-26 NOTE — PROGRESS NOTES
Nephrology Progress Note Berna Garcia Date of Admission : 11/24/2020 CC: Follow up for  Renal Tx Assessment and Plan Renal Transplant: 
- cont current IS meds - MMF, prograf, IV solumedrol - prograf level daily while here 
- reduced IVF to 50 cc/ hr  
- daily labs Hyperkalemia: 
- k BETTER 
- one dose of Veltassa today  
  
CKD IV: 
- baseline Cr appears to be around 2 
- cont IVF as above COVID-19 
  
Diffuse large B cell lymphoma Leucopenia - ANC > 1200 HTN: 
- added hydralazine  
  
DM2: 
- on insulin Interval History: Not examined in the room Per RN, stable night. Voiding, Cr stable. BP stable. No issues with oxygenation. per RN, stable oxygenation. Current Medications: all current  Medications have been eviewed in [facility] Review of Systems: Pertinent items are noted in HPI. Objective: 
Vitals:   
Vitals:  
 11/26/20 0500 11/26/20 0600 11/26/20 0655 11/26/20 0700 BP: (!) 178/71 (!) 182/70  (!) 157/63 Pulse: (!) 55 60  60 Resp: 15 22  19 Temp:      
SpO2: 98% 98%  98% Weight:   70.1 kg (154 lb 8.7 oz) Height:      
 
Intake and Output: 
No intake/output data recorded. 11/24 1901 - 11/26 0700 In: 3800 [I.V.:3800] Out: 2250 [Urine:2250] Physical Examination:  Not examined in the room due to COVID-19 infection Pt intubated     No 
General: NAD Resp:  Stable oxygenation CV:  RRR on monitor, no edema Neurologic:  Non focal 
:  No knox []    High complexity decision making was performed 
[]    Patient is at high-risk of decompensation with multiple organ involvement Lab Data Personally Reviewed: I have reviewed all the pertinent labs, microbiology data and radiology studies during assessment. Recent Labs  
  11/26/20 
0349 11/26/20 
0348 11/25/20 
1244 11/25/20 
0405 11/24/20 
1025  139 139 136 135*  
K 5.1 5.2* 5.1 6.0* 5.4*  
 105 102 103 98 CO2 27 26 29 26 33* * 178* 161* 141* 188* BUN 51* 49* 43* 42* 40* CREA 2.01* 2.05* 2.18* 2.12* 2.41* CA 8.9 9.0 9.3 9.2 9.2 MG  --  2.0  --   --  1.8 PHOS 3.5 3.5 4.2  --   --   
ALB 2.7*  --  2.9* 2.7* 3.2* ALT  --   --   --  12 10* INR  --   --   --   --  1.1 Recent Labs  
  11/26/20 
0348 11/25/20 
0405 11/24/20 
1025 WBC 2.0* 3.1* 4.7 HGB 9.8* 10.3* 9.9*  
HCT 32.7* 35.1 32.9*  
 201 239 No results found for: SDES No results found for: CULT Recent Results (from the past 24 hour(s)) SARS-COV-2 Collection Time: 11/25/20 12:11 PM  
Result Value Ref Range Specimen source Nasopharyngeal    
 SARS-CoV-2 Detected (AA) NOTD    
 SARS-CoV-2 PENDING Specimen source Nasopharyngeal    
 COVID-19 rapid test PENDING Specimen type NP Swab Health status Symptomatic Testing COVID-19 PENDING   
GLUCOSE, POC Collection Time: 11/25/20 12:28 PM  
Result Value Ref Range Glucose (POC) 139 (H) 65 - 100 mg/dL Performed by Bailee Bowie RENAL FUNCTION PANEL Collection Time: 11/25/20 12:44 PM  
Result Value Ref Range Sodium 139 136 - 145 mmol/L Potassium 5.1 3.5 - 5.1 mmol/L Chloride 102 97 - 108 mmol/L  
 CO2 29 21 - 32 mmol/L Anion gap 8 5 - 15 mmol/L Glucose 161 (H) 65 - 100 mg/dL BUN 43 (H) 6 - 20 MG/DL Creatinine 2.18 (H) 0.55 - 1.02 MG/DL  
 BUN/Creatinine ratio 20 12 - 20 GFR est AA 27 (L) >60 ml/min/1.73m2 GFR est non-AA 22 (L) >60 ml/min/1.73m2 Calcium 9.3 8.5 - 10.1 MG/DL Phosphorus 4.2 2.6 - 4.7 MG/DL Albumin 2.9 (L) 3.5 - 5.0 g/dL HIV 1/2 AG/AB, 4TH GENERATION,W RFLX CONFIRM Collection Time: 11/25/20 12:44 PM  
Result Value Ref Range HIV 1/2 Interpretation NONREACTIVE NR    
 HIV 1/2 result comment SEE NOTE    
HEP B SURFACE AG Collection Time: 11/25/20 12:44 PM  
Result Value Ref Range Hepatitis B surface Ag <0.10 Index Hep B surface Ag Interp. Negative NEG    
HEP B SURFACE AB Collection Time: 11/25/20 12:44 PM  
Result Value Ref Range Hepatitis B surface Ab 17.18 mIU/mL Hep B surface Ab Interp. REACTIVE (A) NR    
HEPATITIS B CORE AB, TOTAL Collection Time: 11/25/20 12:44 PM  
Result Value Ref Range Hep B Core Ab, total Negative Negative HEPATITIS C AB Collection Time: 11/25/20 12:44 PM  
Result Value Ref Range Hep C virus Ab Interp. NONREACTIVE NR Hep C  virus Ab comment Method used is [facility] LD Collection Time: 11/25/20 12:44 PM  
Result Value Ref Range  (H) 81 - 246 U/L  
URIC ACID Collection Time: 11/25/20 12:44 PM  
Result Value Ref Range Uric acid 10.1 (H) 2.6 - 6.0 MG/DL  
GLUCOSE, POC Collection Time: 11/25/20  6:17 PM  
Result Value Ref Range Glucose (POC) 295 (H) 65 - 100 mg/dL Performed by Rosa Myles GLUCOSE, POC Collection Time: 11/25/20 11:26 PM  
Result Value Ref Range Glucose (POC) 257 (H) 65 - 100 mg/dL Performed by Azalea Tao Collection Time: 11/26/20  3:48 AM  
Result Value Ref Range D-dimer 1.09 (H) 0.00 - 0.65 mg/L FEU  
CBC WITH AUTOMATED DIFF Collection Time: 11/26/20  3:48 AM  
Result Value Ref Range WBC 2.0 (L) 3.6 - 11.0 K/uL  
 RBC 3.55 (L) 3.80 - 5.20 M/uL HGB 9.8 (L) 11.5 - 16.0 g/dL HCT 32.7 (L) 35.0 - 47.0 % MCV 92.1 80.0 - 99.0 FL  
 MCH 27.6 26.0 - 34.0 PG  
 MCHC 30.0 30.0 - 36.5 g/dL  
 RDW 16.6 (H) 11.5 - 14.5 % PLATELET 649 668 - 766 K/uL MPV 10.2 8.9 - 12.9 FL  
 NRBC 0.0 0  WBC ABSOLUTE NRBC 0.00 0.00 - 0.01 K/uL NEUTROPHILS 68 32 - 75 % LYMPHOCYTES 12 12 - 49 % MONOCYTES 19 (H) 5 - 13 % EOSINOPHILS 0 0 - 7 % BASOPHILS 0 0 - 1 % IMMATURE GRANULOCYTES 1 (H) 0.0 - 0.5 % ABS. NEUTROPHILS 1.4 (L) 1.8 - 8.0 K/UL  
 ABS. LYMPHOCYTES 0.2 (L) 0.8 - 3.5 K/UL  
 ABS. MONOCYTES 0.4 0.0 - 1.0 K/UL  
 ABS. EOSINOPHILS 0.0 0.0 - 0.4 K/UL  
 ABS. BASOPHILS 0.0 0.0 - 0.1 K/UL  
 ABS. IMM.  GRANS. 0.0 0.00 - 0.04 K/UL  
 DF SMEAR SCANNED    
 RBC COMMENTS ANISOCYTOSIS 1+ 
    
 RBC COMMENTS TARGET CELLS 1+ RBC COMMENTS OVALOCYTES 1+ RBC COMMENTS SCHISTOCYTES 1+ METABOLIC PANEL, BASIC Collection Time: 11/26/20  3:48 AM  
Result Value Ref Range Sodium 139 136 - 145 mmol/L Potassium 5.2 (H) 3.5 - 5.1 mmol/L Chloride 105 97 - 108 mmol/L  
 CO2 26 21 - 32 mmol/L Anion gap 8 5 - 15 mmol/L Glucose 178 (H) 65 - 100 mg/dL BUN 49 (H) 6 - 20 MG/DL Creatinine 2.05 (H) 0.55 - 1.02 MG/DL  
 BUN/Creatinine ratio 24 (H) 12 - 20 GFR est AA 29 (L) >60 ml/min/1.73m2 GFR est non-AA 24 (L) >60 ml/min/1.73m2 Calcium 9.0 8.5 - 10.1 MG/DL MAGNESIUM Collection Time: 11/26/20  3:48 AM  
Result Value Ref Range Magnesium 2.0 1.6 - 2.4 mg/dL PHOSPHORUS Collection Time: 11/26/20  3:48 AM  
Result Value Ref Range Phosphorus 3.5 2.6 - 4.7 MG/DL RENAL FUNCTION PANEL Collection Time: 11/26/20  3:49 AM  
Result Value Ref Range Sodium 138 136 - 145 mmol/L Potassium 5.1 3.5 - 5.1 mmol/L Chloride 104 97 - 108 mmol/L  
 CO2 27 21 - 32 mmol/L Anion gap 7 5 - 15 mmol/L Glucose 177 (H) 65 - 100 mg/dL BUN 51 (H) 6 - 20 MG/DL Creatinine 2.01 (H) 0.55 - 1.02 MG/DL  
 BUN/Creatinine ratio 25 (H) 12 - 20 GFR est AA 30 (L) >60 ml/min/1.73m2 GFR est non-AA 24 (L) >60 ml/min/1.73m2 Calcium 8.9 8.5 - 10.1 MG/DL Phosphorus 3.5 2.6 - 4.7 MG/DL Albumin 2.7 (L) 3.5 - 5.0 g/dL GLUCOSE, POC Collection Time: 11/26/20  6:41 AM  
Result Value Ref Range Glucose (POC) 140 (H) 65 - 100 mg/dL Performed by Carmen Mack MD 
[address], [address] [facility] Phone - [phone] Fax - [phone] 
[email or web address]

## 2020-11-26 NOTE — PROGRESS NOTES
Hematology-Oncology Progress Note Amisha Cohn 1950 
[id number] 
11/26/2020 Subjective:  
 
Patient awake and alert. Speaking with brother on phone. Denies dyspnea. Case d/w nugage and pharmacy Teresa Penny) who explains rituximab was signed, but \"not released\". He has released it now and explains it will be transferred to the floor before 1:30 pm.  
 
Allergies: Benadryl [diphenhydramine hcl]; Fentanyl; Morphine; and Versed [midazolam] Current Facility-Administered Medications Medication Dose Route Frequency Provider Last Rate Last Dose  hydrALAZINE (APRESOLINE) 20 mg/mL injection 10 mg  10 mg IntraVENous Q6H PRN Sandra Pritchard NP   10 mg at 11/26/20 7024  hydrALAZINE (APRESOLINE) tablet 25 mg  25 mg Oral TID Diane YEE MD   25 mg at 11/26/20 0846  
 patiromer calcium sorbitex (VELTASSA) powder 8.4 g  8.4 g Oral DAILY Reginaldo Boyer MD   8.4 g at 11/26/20 1014  hydrOXYzine HCL (ATARAX) tablet 10 mg  10 mg Oral TID PRN Zabrina Martins NP   10 mg at 11/26/20 1014  piperacillin-tazobactam (ZOSYN) 3.375 g in 0.9% sodium chloride (MBP/ADV) 100 mL MBP  3.375 g IntraVENous Q8H Zabrina Martins NP 25 mL/hr at 11/26/20 1014 3.375 g at 11/26/20 1014  pantoprazole (PROTONIX) 40 mg in 0.9% sodium chloride 10 mL injection  40 mg IntraVENous DAILY Sofie Kayser, NP   40 mg at 11/26/20 0846  
 allopurinoL (ZYLOPRIM) tablet 100 mg  100 mg Oral BID Camille Valles MD   100 mg at 11/26/20 0901  mycophenolate mofetil (CELLCEPT) capsule 250 mg  250 mg Oral ACB&D Angie Ochoa MD   250 mg at 11/26/20 4258  zinc sulfate (ZINCATE) 220 (50) mg capsule 1 Cap  1 Cap Oral DAILY Zabrina Martins NP   1 Cap at 11/26/20 7410  
 ascorbic acid (vitamin C) (VITAMIN C) tablet 500 mg  500 mg Oral BID Zabrina Martins, NP   500 mg at 11/26/20 7877  cholecalciferol (VITAMIN D3) (1000 Units /25 mcg) tablet 2 Tab  2,000 Units Oral DAILY Zabrina Martins NP   2 Tab at 11/26/20 9002  
 albuterol (PROVENTIL HFA, VENTOLIN HFA, PROAIR HFA) inhaler 2 Puff  2 Puff Inhalation Q4H PRN Olive Hogue NP      
 zinc oxide-cod liver oil (DESITIN) 40 % paste   Topical Q8H Jf Romero DO      
 acetaminophen (TYLENOL) tablet 650 mg  650 mg Oral Q6H PRN Charan Perez MD   650 mg at 11/25/20 2136 Or  
 acetaminophen (TYLENOL) suppository 650 mg  650 mg Rectal Q6H PRN Charan Raygoza MD      
 methylPREDNISolone (PF) (SOLU-MEDROL) injection 40 mg  40 mg IntraVENous Q12H Charan Raygoza MD   40 mg at 11/26/20 8759  sodium chloride (NS) flush 5-40 mL  5-40 mL IntraVENous Q8H Charan Raygoza MD   10 mL at 11/26/20 5407  sodium chloride (NS) flush 5-40 mL  5-40 mL IntraVENous PRN Charan Raygoza MD      
 polyethylene glycol (MIRALAX) packet 17 g  17 g Oral DAILY PRN Charan Raygoza MD      
 enoxaparin (LOVENOX) injection 30 mg  30 mg SubCUTAneous Q24H Chaarn Raygoza MD   30 mg at 11/25/20 1814  
 0.9% sodium chloride infusion  50 mL/hr IntraVENous CONTINUOUS Reginaldo Boyer MD 50 mL/hr at 11/26/20 0841 50 mL/hr at 11/26/20 9130  ondansetron (ZOFRAN) injection 4 mg  4 mg IntraVENous Q6H PRN Charan Raygoza MD      
 amLODIPine (NORVASC) tablet 10 mg  10 mg Oral DAILY Charan Raygoza MD   10 mg at 11/26/20 0846  
 aspirin delayed-release tablet 81 mg  81 mg Oral DAILY Charan Raygoza MD   81 mg at 11/26/20 0846  
 melatonin tablet 3 mg  3 mg Oral QHS Charan Raygoza MD   3 mg at 11/25/20 2135  metoprolol tartrate (LOPRESSOR) tablet 50 mg  50 mg Oral BID Charan Perez MD   50 mg at 11/26/20 5482  tacrolimus (PROGRAF) capsule 1 mg  1 mg Oral Q12H Charan Raygoza MD   1 mg at 11/26/20 0847  
 glucose chewable tablet 16 g  4 Tab Oral PRN Charan Raygoza MD      
 glucagon (GLUCAGEN) injection 1 mg  1 mg IntraMUSCular PRN Charan Raygoza MD      
 dextrose 10% infusion 0-250 mL  0-250 mL IntraVENous PRN Ravi Villa MD Charan      
 insulin lispro (HUMALOG) injection   SubCUTAneous Q6H Charan Raygoza MD   2 Units at 11/26/20 1150  
 labetaloL (NORMODYNE;TRANDATE) injection 20 mg  20 mg IntraVENous Q4H PRN Charan Raygoza MD   20 mg at 11/25/20 1157 Objective:  
 
Patient Vitals for the past 24 hrs: 
 BP Temp Pulse Resp SpO2 Height Weight 11/26/20 1000 (!) 145/71  (!) 58 15 100 %    
11/26/20 0900 (!) 159/64  (!) 59 18 99 %    
11/26/20 0800 (!) 141/65 97.9 °F (36.6 °C) (!) 58 16 98 %    
11/26/20 0700 (!) 157/63  60 19 98 %    
11/26/20 0655       70.1 kg (154 lb 8.7 oz)  
11/26/20 0600 (!) 182/70  60 22 98 %    
11/26/20 0500 (!) 178/71  (!) 55 15 98 %    
11/26/20 0400 (!) 182/70 97.7 °F (36.5 °C) (!) 56 20 98 %    
11/26/20 0300 (!) 189/79  (!) 53 17 97 %    
11/26/20 0200 (!) 176/75  60 17 98 %    
11/26/20 0100 (!) 166/85  (!) 58 20 99 %    
11/26/20 0000 (!) 177/69 97.6 °F (36.4 °C) 60 18 98 %    
11/25/20 2300 (!) 176/71  61 22 99 %    
11/25/20 2200 (!) 171/74  60 12 100 %    
11/25/20 2100 (!) 170/78  (!) 58 18 100 %    
11/25/20 2000 (!) 154/133 98 °F (36.7 °C) (!) 59 17 100 %    
11/25/20 1900 (!) 173/65  70 19 99 %    
11/25/20 1814 (!) 170/70  75      
11/25/20 1800 (!) 170/70  64 14 94 %    
11/25/20 1700 (!) 148/74  73 19 98 %    
11/25/20 1600 (!) 158/76 97.7 °F (36.5 °C) 75 24 97 %    
11/25/20 1500 (!) 158/70  69 16 99 %    
11/25/20 1400 (!) 156/73  75 16 98 %    
11/25/20 1300 (!) 164/74  68 24 95 %    
11/25/20 1200 (!) 164/88 97.7 °F (36.5 °C) 63 13 97 %    
11/25/20 1157 (!) 180/113  63      
11/25/20 1156      5' 3\" (1.6 m)  Gen: NAD HEENT: PERRL, Sclerae anicteric Cv: RRR without m/r/g Pulm: CTA bilaterally Abd: NABS, NTND, No HSM Ext: No c/c/e Available labs reviewed: 
Labs:   
Recent Results (from the past 24 hour(s)) SARS-COV-2  Collection Time: 11/25/20 12:11 PM  
Result Value Ref Range Specimen source Nasopharyngeal    
 SARS-CoV-2 Detected (AA) NOTD Specimen source Nasopharyngeal    
 Specimen type NP Swab Health status Symptomatic Testing CULTURE, MRSA Collection Time: 11/25/20 12:11 PM  
 Specimen: Nasal  
Result Value Ref Range Special Requests: NO SPECIAL REQUESTS Culture result: MRSA NOT PRESENT AT 13 HOURS    
GLUCOSE, POC Collection Time: 11/25/20 12:28 PM  
Result Value Ref Range Glucose (POC) 139 (H) 65 - 100 mg/dL Performed by Chevy Cesar RENAL FUNCTION PANEL Collection Time: 11/25/20 12:44 PM  
Result Value Ref Range Sodium 139 136 - 145 mmol/L Potassium 5.1 3.5 - 5.1 mmol/L Chloride 102 97 - 108 mmol/L  
 CO2 29 21 - 32 mmol/L Anion gap 8 5 - 15 mmol/L Glucose 161 (H) 65 - 100 mg/dL BUN 43 (H) 6 - 20 MG/DL Creatinine 2.18 (H) 0.55 - 1.02 MG/DL  
 BUN/Creatinine ratio 20 12 - 20 GFR est AA 27 (L) >60 ml/min/1.73m2 GFR est non-AA 22 (L) >60 ml/min/1.73m2 Calcium 9.3 8.5 - 10.1 MG/DL Phosphorus 4.2 2.6 - 4.7 MG/DL Albumin 2.9 (L) 3.5 - 5.0 g/dL HIV 1/2 AG/AB, 4TH GENERATION,W RFLX CONFIRM Collection Time: 11/25/20 12:44 PM  
Result Value Ref Range HIV 1/2 Interpretation NONREACTIVE NR    
 HIV 1/2 result comment SEE NOTE    
HEP B SURFACE AG Collection Time: 11/25/20 12:44 PM  
Result Value Ref Range Hepatitis B surface Ag <0.10 Index Hep B surface Ag Interp. Negative NEG    
HEP B SURFACE AB Collection Time: 11/25/20 12:44 PM  
Result Value Ref Range Hepatitis B surface Ab 17.18 mIU/mL Hep B surface Ab Interp. REACTIVE (A) NR    
HEPATITIS B CORE AB, TOTAL Collection Time: 11/25/20 12:44 PM  
Result Value Ref Range Hep B Core Ab, total Negative Negative HEPATITIS C AB Collection Time: 11/25/20 12:44 PM  
Result Value Ref Range Hep C virus Ab Interp. NONREACTIVE NR Hep C  virus Ab comment Method used is [facility]  Collection Time: 11/25/20 12:44 PM  
Result Value Ref Range  (H) 81 - 246 U/L  
URIC ACID Collection Time: 11/25/20 12:44 PM  
Result Value Ref Range Uric acid 10.1 (H) 2.6 - 6.0 MG/DL  
GLUCOSE, POC Collection Time: 11/25/20  6:17 PM  
Result Value Ref Range Glucose (POC) 295 (H) 65 - 100 mg/dL Performed by Eitan Ontiveros GLUCOSE, POC Collection Time: 11/25/20 11:26 PM  
Result Value Ref Range Glucose (POC) 257 (H) 65 - 100 mg/dL Performed by Kameron Zimmer Collection Time: 11/26/20  3:48 AM  
Result Value Ref Range D-dimer 1.09 (H) 0.00 - 0.65 mg/L FEU  
CBC WITH AUTOMATED DIFF Collection Time: 11/26/20  3:48 AM  
Result Value Ref Range WBC 2.0 (L) 3.6 - 11.0 K/uL  
 RBC 3.55 (L) 3.80 - 5.20 M/uL HGB 9.8 (L) 11.5 - 16.0 g/dL HCT 32.7 (L) 35.0 - 47.0 % MCV 92.1 80.0 - 99.0 FL  
 MCH 27.6 26.0 - 34.0 PG  
 MCHC 30.0 30.0 - 36.5 g/dL  
 RDW 16.6 (H) 11.5 - 14.5 % PLATELET 328 300 - 331 K/uL MPV 10.2 8.9 - 12.9 FL  
 NRBC 0.0 0  WBC ABSOLUTE NRBC 0.00 0.00 - 0.01 K/uL NEUTROPHILS 68 32 - 75 % LYMPHOCYTES 12 12 - 49 % MONOCYTES 19 (H) 5 - 13 % EOSINOPHILS 0 0 - 7 % BASOPHILS 0 0 - 1 % IMMATURE GRANULOCYTES 1 (H) 0.0 - 0.5 % ABS. NEUTROPHILS 1.4 (L) 1.8 - 8.0 K/UL  
 ABS. LYMPHOCYTES 0.2 (L) 0.8 - 3.5 K/UL  
 ABS. MONOCYTES 0.4 0.0 - 1.0 K/UL  
 ABS. EOSINOPHILS 0.0 0.0 - 0.4 K/UL  
 ABS. BASOPHILS 0.0 0.0 - 0.1 K/UL  
 ABS. IMM. GRANS. 0.0 0.00 - 0.04 K/UL  
 DF SMEAR SCANNED    
 RBC COMMENTS ANISOCYTOSIS 1+ 
    
 RBC COMMENTS TARGET CELLS 1+ RBC COMMENTS OVALOCYTES 1+ RBC COMMENTS SCHISTOCYTES 1+ METABOLIC PANEL, BASIC Collection Time: 11/26/20  3:48 AM  
Result Value Ref Range Sodium 139 136 - 145 mmol/L Potassium 5.2 (H) 3.5 - 5.1 mmol/L Chloride 105 97 - 108 mmol/L  
 CO2 26 21 - 32 mmol/L Anion gap 8 5 - 15 mmol/L Glucose 178 (H) 65 - 100 mg/dL  BUN 49 (H) 6 - 20 MG/DL Creatinine 2.05 (H) 0.55 - 1.02 MG/DL  
 BUN/Creatinine ratio 24 (H) 12 - 20 GFR est AA 29 (L) >60 ml/min/1.73m2 GFR est non-AA 24 (L) >60 ml/min/1.73m2 Calcium 9.0 8.5 - 10.1 MG/DL MAGNESIUM Collection Time: 11/26/20  3:48 AM  
Result Value Ref Range Magnesium 2.0 1.6 - 2.4 mg/dL PHOSPHORUS Collection Time: 11/26/20  3:48 AM  
Result Value Ref Range Phosphorus 3.5 2.6 - 4.7 MG/DL RENAL FUNCTION PANEL Collection Time: 11/26/20  3:49 AM  
Result Value Ref Range Sodium 138 136 - 145 mmol/L Potassium 5.1 3.5 - 5.1 mmol/L Chloride 104 97 - 108 mmol/L  
 CO2 27 21 - 32 mmol/L Anion gap 7 5 - 15 mmol/L Glucose 177 (H) 65 - 100 mg/dL BUN 51 (H) 6 - 20 MG/DL Creatinine 2.01 (H) 0.55 - 1.02 MG/DL  
 BUN/Creatinine ratio 25 (H) 12 - 20 GFR est AA 30 (L) >60 ml/min/1.73m2 GFR est non-AA 24 (L) >60 ml/min/1.73m2 Calcium 8.9 8.5 - 10.1 MG/DL Phosphorus 3.5 2.6 - 4.7 MG/DL Albumin 2.7 (L) 3.5 - 5.0 g/dL GLUCOSE, POC Collection Time: 11/26/20  6:41 AM  
Result Value Ref Range Glucose (POC) 140 (H) 65 - 100 mg/dL Performed by Ravi Burns   
GLUCOSE, POC Collection Time: 11/26/20 11:42 AM  
Result Value Ref Range Glucose (POC) 144 (H) 65 - 100 mg/dL Performed by Mariam Mendez Assessment and Plan  
 
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 Diagnosed with COVID. Immunosuppression has been reduced. 1. PTLD: Given impending oropharyngeal obstruction, will need to treat. Rituximab today. I have personally spoken with the pharmacist Aleksandar Cole who has agreed to release the drug and bring it to the pharmacy. 2. ESRD s/p renal transplant: on reduced dose immunosuppression. 3. Dyspnea: resolved, on nasal cannula oxygen. Thank you for allowing us to participate in the care of this very pleasant patient. Veda Monzon MD 
Hematology/Oncology Phone [phone]

## 2020-11-26 NOTE — PROGRESS NOTES
[facility] 
 
ICU TEAM Progress Note Name: Clare Lindsey : 1950 MRN: [id number] Date: 2020 Reason for ICU Admission: Neck mass with impending airway compromise HPI: 
This is a 69-year-old female with multiple medical problem was recently diagnosed with diffuse large cell lymphoma late 2020 after tonsillar biopsy. She did not start treatment yet. For the last few days she noticed intermittent chills and fever. She also noticed some occasional shortness of breath and cough associated with minimal blood mixed with the phlegm. The bleeding is not progressive and she has not seen it in a few days now. No nausea no vomiting though she admits that her oral intake been decreasing and she is feeling overall tired. 2020 she went to her local hospital to get port inserted to start her chemotherapy where they found her to be febrile ,Covid testing was positive and CAT scan of the neck showed worsening of the compromise of the airway so ENT was contacted from that ER and he recommended transferring care to our hospital for further evaluation. At the time of initial examination patient  lying in bed not in distress, hoarse voice but according to her not much changed, able to swallow her secretions no drooling. Her main complaint is generalized fatigue and some pain in her throat which been there for a while. Active Problem List:  
 
Problem List  Date Reviewed: 2020 Codes Class Post-transplant lymphoproliferative disorder (HCC) ICD-10-CM: T86.99, D47.Z1 
ICD-9-CM: 996.80, 238.77 Lymphoma ([facility] [address] ICD-10-CM: C85.90 ICD-9-CM: 202.80 CHF (congestive heart failure) (HCC) ICD-10-CM: I50.9 ICD-9-CM: 428.0 Tonsillar mass ICD-10-CM: J35.8 ICD-9-CM: 474.8 Past Medical History:  
 
 has a past medical history of Chronic kidney disease, Diabetes ([address] Heart failure ([address] Hypertension, Kidney disease, Sleep apnea, and Throat cancer ([facility] [address] Past Surgical History:  
 
 has a past surgical history that includes hx cholecystectomy and hx renal transplant. Home Medications:  
 
Prior to Admission medications Medication Sig Start Date End Date Taking? Authorizing Provider  
mycophenolate mofetil (CellCept) 250 mg capsule Take 250 mg by mouth two (2) times a day. Yes Provider, Historical  
phenol throat spray (CHLORASEPTIC) 1.4 % spray Take 1 Spray by mouth three (3) times daily as needed for Sore throat. 11/17/20   Gladis Chino MD  
furosemide (LASIX) 40 mg tablet Take 2 Tabs by mouth Daily (before breakfast). 11/17/20   Gladis Chino MD  
furosemide (LASIX) 40 mg tablet Take 1 Tab by mouth Daily (before dinner). 11/17/20   Gladis Chino MD  
zolpidem (Ambien) 5 mg tablet Take 5 mg by mouth nightly. Provider, Historical  
melatonin 3 mg tablet Take 3 mg by mouth nightly. Provider, Historical  
insulin lispro (HumaLOG U-100 Insulin) 100 unit/mL injection 5 Units by SubCUTAneous route Before breakfast, lunch, and dinner. Provider, Historical  
tacrolimus (PROGRAF) 1 mg capsule Take 1 Cap by mouth every twelve (12) hours for 30 days. 10/28/20 11/27/20  Star SNOW MD  
amLODIPine (NORVASC) 10 mg tablet Take 10 mg by mouth daily. Provider, Historical  
metoprolol tartrate (LOPRESSOR) 50 mg tablet Take 50 mg by mouth two (2) times a day. Provider, Historical  
calcitRIOL (ROCALTROL) 0.25 mcg capsule Take 0.25 mcg by mouth daily. Provider, Historical  
hydrALAZINE (APRESOLINE) 50 mg tablet Take 50 mg by mouth three (3) times daily. Provider, Historical  
isosorbide mononitrate ER (IMDUR) 60 mg CR tablet Take 60 mg by mouth two (2) times a day. Provider, Historical  
omeprazole (PRILOSEC) 40 mg capsule Take 40 mg by mouth daily. Provider, Historical  
sodium bicarbonate 650 mg tablet Take 1,300 mg by mouth four (4) times daily.     Provider, Historical  
gemfibroziL (LOPID) 600 mg tablet Take 600 mg by mouth daily. Provider, Historical  
predniSONE (DELTASONE) 2.5 mg tablet Take 7.5 mg by mouth daily (with breakfast). Provider, Historical  
aspirin delayed-release 81 mg tablet Take 81 mg by mouth daily. Provider, Historical  
cholecalciferol (VITAMIN D3) (2,000 UNITS /50 MCG) cap capsule Take  by mouth two (2) times a day. Provider, Historical  
 
 
Allergies/Social/Family History: Allergies Allergen Reactions  Benadryl [Diphenhydramine Hcl] Other (comments)  Fentanyl Other (comments)  Morphine Unknown (comments)  Versed [Midazolam] Other (comments) Social History Tobacco Use  Smoking status: Never Smoker  Smokeless tobacco: Never Used Substance Use Topics  Alcohol use: Never Frequency: Never Family History Problem Relation Age of Onset  Hypertension Mother Review of Systems:  
 
10 system reviewed and they are negative but as in interval history Objective:  
Vital Signs: 
Visit Vitals BP (!) 157/63 Pulse 60 Temp 97.7 °F (36.5 °C) Resp 19 Ht 5' 3\" (1.6 m) Wt 70.1 kg (154 lb 8.7 oz) SpO2 98% BMI 27.38 kg/m² O2 Flow Rate (L/min): 2 l/min O2 Device: Nasal cannula Temp (24hrs), Av.8 °F (36.6 °C), Min:97.6 °F (36.4 °C), Max:98 °F (36.7 °C) Intake/Output:  
 
Intake/Output Summary (Last 24 hours) at 2020 1915 Last data filed at 2020 0700 Gross per 24 hour Intake 2710 ml Output 1700 ml Net 1010 ml Physical Exam: 
 
General:  Alert, cooperative, chronically ill looking Eyes:  Sclera anicteric. Pupils equally round and reactive to light. Mouth/Throat:  Mallampati 4, there is an area but seems to be an old incision no active bleeding no fresh blood Neck: Supple, lymph node appreciated Lungs:   Clear to auscultation bilaterally, good effort CV:  Regular rate and rhythm,no murmur, click, rub or gallop Abdomen:   Soft, non-tender. bowel sounds normal. non-distended, transplanted kidney palpable in left lower quadrant Extremities: No cyanosis or edema Skin: Skin color, texture, turgor normal. no acute rash or lesions Lymph nodes: Cervical and supraclavicular normal  
Musculoskeletal: No swelling or deformity Lines/Devices:  Intact, no erythema, drainage or tenderness Psych: Alert and oriented, normal mood affect given the setting LABS AND  DATA: Personally reviewed Recent Labs  
  11/26/20 
0348 11/25/20 
0405 WBC 2.0* 3.1* HGB 9.8* 10.3* HCT 32.7* 35.1  201 Recent Labs  
  11/26/20 
0349 11/26/20 
0348  11/24/20 
1025  139   < > 135*  
K 5.1 5.2*   < > 5.4*  
 105   < > 98  
CO2 27 26   < > 33* BUN 51* 49*   < > 40* CREA 2.01* 2.05*   < > 2.41* * 178*   < > 188* CA 8.9 9.0   < > 9.2 MG  --  2.0  --  1.8 PHOS 3.5 3.5   < >  --   
 < > = values in this interval not displayed. Recent Labs  
  11/26/20 
0349 11/25/20 
1244 11/25/20 
0405 11/24/20 
1025 AP  --   --  113 127* TP  --   --  7.0 7.5 ALB 2.7* 2.9* 2.7* 3.2*  
GLOB  --   --  4.3* 4.3*  
LPSE  --   --   --  114 Recent Labs  
  11/24/20 
1025 INR 1.1 PTP 14.5 APTT 40.7* No results for input(s): PHI, PCO2I, PO2I, FIO2I in the last 72 hours. Recent Labs  
  11/24/20 
1025 TROIQ <0.05 Hemodynamics:  
PAP:   CO:    
Wedge:   CI:    
CVP:    SVR:    
  PVR:    
 
Ventilator Settings: 
Mode Rate Tidal Volume Pressure FiO2 PEEP Peak airway pressure:     
Minute ventilation:     
 
 
MEDS: Reviewed Chest X-Ray: 
CT chest and neck reviewed myself and agree with report as documented CXR Results  (Last 48 hours)  
          
 11/24/20 1023  XR CHEST SNGL V Final result Impression:  Impression:   
Persistent perihilar and lower lobe airspace opacities, improved since the prior  
exam, could represent infection or edema in the appropriate clinical setting. Trace left pleural effusion. Narrative:  Clinical history: Fever Comparison: Chest radiograph 11/14/2020. Findings:   
Single view chest. Multiple surgical clips project over the medial right upper  
extremity. A vascular stent projects over the upper right hemithorax. The lungs  
are adequately expanded. There are patchy perihilar and lower lobe airspace  
opacities which are improved compared to the prior exam. Trace left pleural  
effusion. No pneumothorax. Cardiac silhouette is normal in size. Osseous  
structures are unchanged. ECHO: November 16, 2020 · LV: Estimated LVEF is 65 - 70%. Normal cavity size and systolic function (ejection fraction normal). Moderate concentric hypertrophy. Wall motion: normal. Mild (grade 1) left ventricular diastolic dysfunction. · LA: Mildly dilated left atrium. Left Atrium volume index is 31 mL/m2. · AV: Aortic valve leaflet calcification present. · MV: Mitral valve thickening. · TV: Mild tricuspid valve regurgitation is present. · PV: Mild pulmonic valve regurgitation is present. · IVC: Moderately elevated central venous pressure (10-15 mmHg); IVC diameter is larger than 21 mm and collapses more than 50% with respiration. Assessment and Plan:  
Diffuse large B-cell lymphoma: Diagnosed in October. Did not receive chemotherapy yet. No coagulopathy or thrombocytopenia. Consult to hematology oncology and appreciate their input. She has an oncologist and [place] but does not seem to be on medical staff in this hospital. Will start rituximab per hematology. COVID-19 positive: Rapid and PCR positive. She will be started on steroid. No clear evidence of pneumonia on CAT scan though this may evolve later. At baseline patient uses 3 L nasal cannula. Will consider remdesivir and other therapeutic option if indicated. Started vitamin D, Vitamin C, Zinc, urine for legionella and strep given immunocompromised state.  Remdesivir or convalescent plasma candidate? Impending airway compromise: At this time she is able to swallow her secretions, no stridor and able to communicate. ER physician contacted ENT and I appreciate their opinion. Speech evaluation. Status post kidney transplant on immunosuppression: Resume CellCept and tacrolimus, will consult nephrology to further adjust this medication giving her lymphoma diagnosis. Acute on chronic kidney disease: IV fluid hydration, appreciate nephrology input COPD: Does not seem to be in acute exacerbation. She is already on steroid. Resume nebulization treatment. Chronic anemia: 
Diabetes mellitus type 2: Sliding scale Hypertension: Continue home medication, need to reconcile home medication list more thouroughly, it appears she was on hydralazine PO at home. Will continue PRN for now pending review. DVT and GI prophylaxis CRITICAL CARE CONSULTANT NOTE I had a face to face encounter with the patient, reviewed and interpreted patient data including clinical events, labs, images, vital signs, I/O's, and examined patient. I have discussed the case and the plan and management of the patient's care with the consulting services, the bedside nurses and the respiratory therapist.   
 
NOTE OF PERSONAL INVOLVEMENT IN CARE This patient has a high probability of imminent, clinically significant deterioration, which requires the highest level of preparedness to intervene urgently. I participated in the decision-making and personally managed or directed the management of the following life and organ supporting interventions that required my frequent assessment to treat or prevent imminent deterioration. I personally spent 110 minutes of critical care time. This is time spent at this critically ill patient's bedside actively involved in patient care as well as the coordination of care and discussions with the patient's family.   This does not include any procedural time which has been billed separately. Idalmis [facility]UL[facility]AGAChel[facility] Critical Care Medicine [facility]

## 2020-11-26 NOTE — PROGRESS NOTES
Renal Dosing/Monitoring Medication: piperacillin/tazobactam  
Current regimen:  3.375g every 12 hr 
Recent Labs  
  11/26/20 
0349 11/26/20 
0348 11/25/20 
1244 CREA 2.01* 2.05* 2.18* BUN 51* 49* 43* Estimated CrCl:  24 ml/min Plan: Change to 3.375 g IV every 8 hours per [facility] P&T Committee Protocol with respect to renal function. Pharmacy will continue to monitor patient daily and will make dosage adjustments based upon changing renal function. Lyudmila Sequeira, PharmD, BCPP, BCPS Clinical Pharmacy Specialist, [facility]

## 2020-11-26 NOTE — PROGRESS NOTES
1930 Bedside and Verbal shift change report given to SHELLIE Whiting RN (oncoming nurse) by Rosi Georges RN (offgoing nurse). Report included the following information SBAR, Kardex, Intake/Output, MAR, Recent Results, Cardiac Rhythm NSR and Alarm Parameters . 0730 Bedside and Verbal shift change report given to Haily He RN (oncoming nurse) by Chilo Kaur RN (offgoing nurse). Report included the following information SBAR, Kardex, Intake/Output, MAR, Recent Results, Cardiac Rhythm NSR/SB and Alarm Parameters .

## 2020-11-26 NOTE — PROGRESS NOTES
0730: Bedside shift change report given to Jona Liang (oncoming nurse) by [facility] (offgoing nurse). Report included the following information SBAR, Kardex, Intake/Output, MAR and Cardiac Rhythm SB/NSR. Primary Nurse Elizabeth Staples and Boyd Shaikh, RN performed a dual skin assessment on this patient No impairment noted Bi score is 14 
 
 
1415: Spoke with [facility] regarding pt's allergy to benadryl. Per RN, given 25 of benadryl as the pre-treatment to the rituxamib. 1420: Spoke with 2 pharmacists and nursing supervisor regarding rituxamib administration. Per pharmacy and Teodora Tinsley, nursing supervisor, this drug is not a chemo drug and can be administered by this RN. Communicated this with 2N RN.  
 
1430: Gave report to 2N Rn 700 [facility] 1440: Per Pharmacy, VS to be taken the same as blood administration for rituxamib administration. 1515: Rituxamib admin started. 1600: transfer cancelled due to frequency of VS.  
 
1930: Bedside shift change report given to [facility] (oncoming nurse) by Jona Liang  (offgoing nurse). Report included the following information SBAR, Kardex, Intake/Output and MAR.

## 2020-11-27 NOTE — ROUTINE PROCESS
TRANSFER - IN REPORT: 
 
Verbal report received from Bridget(name) on Barbara Liang  being received from ICU(unit) for routine progression of care Report consisted of patients Situation, Background, Assessment and  
Recommendations(SBAR). Information from the following report(s) SBAR, Kardex, Intake/Output, MAR and Recent Results was reviewed with the receiving nurse. Opportunity for questions and clarification was provided. Assessment completed upon patients arrival to unit and care assumed.

## 2020-11-27 NOTE — PROGRESS NOTES
0730: Bedside shift change report given to Kevin Lenz (oncoming nurse) by Shruti Chung (offgoing nurse). Report included the following information SBAR, Kardex, Intake/Output and MAR. 1030: TRANSFER - OUT REPORT: 
 
Verbal report given to David (name) levi Garcia  being transferred to  (unit) for routine progression of care Report consisted of patients Situation, Background, Assessment and  
Recommendations(SBAR). Information from the following report(s) SBAR, Kardex, Intake/Output and MAR was reviewed with the receiving nurse. Lines:  
Peripheral IV 11/24/20 Right Hand (Active) Site Assessment Clean, dry, & intact 11/27/20 0800 Phlebitis Assessment 0 11/27/20 0800 Infiltration Assessment 0 11/27/20 0800 Dressing Status Clean, dry, & intact 11/27/20 0800 Dressing Type Transparent 11/27/20 0800 Hub Color/Line Status Patent; Flushed 11/27/20 0800 Action Taken Open ports on tubing capped 11/27/20 0800 Alcohol Cap Used Yes 11/27/20 0800 Peripheral IV 11/25/20 Left Antecubital (Active) Site Assessment Clean, dry, & intact 11/27/20 0800 Phlebitis Assessment 0 11/27/20 0800 Infiltration Assessment 0 11/27/20 0800 Dressing Status Clean, dry, & intact 11/27/20 0800 Dressing Type Transparent 11/27/20 0800 Hub Color/Line Status Patent; Flushed 11/27/20 0800 Action Taken Open ports on tubing capped 11/27/20 0800 Alcohol Cap Used Yes 11/27/20 0800 Opportunity for questions and clarification was provided. Patient transported with: 
 Monitor O2 @ 2 liters

## 2020-11-27 NOTE — PROGRESS NOTES
Stable allograft function Daily labs We will check back again on Monday. Rito Bonilla MD 
[facility] Office :[phone] Fax: [phone]

## 2020-11-27 NOTE — PROGRESS NOTES
Problem: Falls - Risk of 
Goal: *Absence of Falls Description: Document Dee Beers Fall Risk and appropriate interventions in the flowsheet. Outcome: Progressing Towards Goal 
Note: Fall Risk Interventions: 
  
 
  
 
Medication Interventions: Patient to call before getting OOB Elimination Interventions: Call light in reach, Toileting schedule/hourly rounds

## 2020-11-27 NOTE — ROUTINE PROCESS
1930 Bedside and Verbal shift change report given to SHELLIE Whiting RN (oncoming nurse) by Molina Chawla RN (offgoing nurse). Report included the following information SBAR, Kardex, Intake/Output, MAR, Recent Results, Cardiac Rhythm NSR and Alarm Parameters . 0730 Bedside and Verbal shift change report given to Molina Chawla RN (oncoming nurse) by Yemi Rdz RN (offgoing nurse). Report included the following information SBAR, Kardex, Intake/Output, MAR, Recent Results, Cardiac Rhythm NSR and Alarm Parameters .

## 2020-11-27 NOTE — PROGRESS NOTES
TRANSITIONS OF CARE PLAN:  
1. DESTINATION: TBD 2. TRANSPORT: likely BLS 3. ADDITIONAL SUPPORT: Spouse, 2 daughters, care aids every day M-F 8:30 - 3:30, 4:30-7:30 every day 4. DME: cane, walker, O2 at 2L, Glucometer, BP Cuff 5. HOME HEALTH: TBD 6. CODE STATUS/AMD STATUS: Full Code; daughter does not know ACP preferences 7. FOLLOW UP APPOINTMENTS: PCP, Hem-Onc 
8. STILL NEEDS: COVID Positive, Stool Studies, ABX, IVF, PT and OT, plasma vs remdesivir Reason for Admission:   Lymphoma RUR Score:     38% PCP: First and Last name: Dr. Keri Acosta Name of Practice: 
 Are you a current patient: Yes/No: yes Approximate date of last visit: 2 weeks Can you do a virtual visit with your PCP:  
          
Resources/supports as identified by patient/family:    
             
Top Challenges facing patient (as identified by patient/family and CM): Finances/Medication cost?      Has AARP and Medicaid Transportation? Medicaid Support system or lack thereof? Good Family support Living arrangements? With spouse in a first floor apartment Self-care/ADLs/Cognition? Assisted with AMDs by care aids Current Advanced Directive/Advance Care Plan:  Full Code; AMD and ACP status and preferences unknown by daughter Plan for utilizing home health:    TBD Transition of Care Plan:         CM attempted to reach patient x3 but phone was busy. CM contacted the daughter ([place]: [phone]). Per daughter, the patient has care aids every day of the week with varying hours. Patient lives with spouse in a first floor apartment. Patient is assisted with most ADLs at minimum for standby assist.  CM notes CM Consult for \"brother's phone number. \"  Per daughter, patient has 3 brothers and is . SPOUSETrenda Apo, [phone] BROTHERS: Alfonso Jeans, Gokul Regan Daughter to return call to CM for the brothers' phone numbers. Care Management Interventions PCP Verified by CM: Yes(last seen by Dr. Milton Sims a couple of weeks ago) Palliative Care Criteria Met (RRAT>21 & CHF Dx)?: No 
Mode of Transport at Discharge: Other (see comment) Transition of Care Consult (CM Consult): Discharge Planning MyChart Signup: No 
Discharge Durable Medical Equipment: No(has cane, walker, O2 at 2L, glucometer, and BP cuff) Health Maintenance Reviewed: Yes(CM spoke with patient's daughter by phone) Physical Therapy Consult: No 
Occupational Therapy Consult: No 
Speech Therapy Consult: Yes Current Support Network: Own Home, Lives with Spouse, Family Lives [place], Has Personal Caregivers(has care aids M-F 8:30-3:30 5x/week, 4:30-7:30 7x/week) Confirm Follow Up Transport: Other (see comment)(medicaid transports patient; asssisted in [place]) [employer] Information Provided?: No 
Discharge Location Discharge Placement: Unable to determine at this time(lives with spouse in a first floor apartment with 1 exteriror step) CRM: Ady Adler, MPH, [address] [place] [address]; Z: [phone]

## 2020-11-27 NOTE — PROGRESS NOTES
Hematology-Oncology Progress Note Rubén Tavera 1950 
[id number] 
11/27/2020 Subjective:  
 
C/o central abdominal pain and diarrhea. Received rituximab last night. Allergies: Benadryl [diphenhydramine hcl]; Fentanyl; Morphine; and Versed [midazolam] Current Facility-Administered Medications Medication Dose Route Frequency Provider Last Rate Last Dose  hydrALAZINE (APRESOLINE) 20 mg/mL injection 10 mg  10 mg IntraVENous Q6H PRN Sandra Pritchard NP   10 mg at 11/27/20 0011  hydrALAZINE (APRESOLINE) tablet 25 mg  25 mg Oral TID Maliha Dowd MD   25 mg at 11/27/20 1763  hydrOXYzine HCL (ATARAX) tablet 10 mg  10 mg Oral TID PRN Jennifer Koenig NP   10 mg at 11/26/20 1014  piperacillin-tazobactam (ZOSYN) 3.375 g in 0.9% sodium chloride (MBP/ADV) 100 mL MBP  3.375 g IntraVENous Q8H Jennifer Koenig NP 25 mL/hr at 11/27/20 0917 3.375 g at 11/27/20 4549  pantoprazole (PROTONIX) 40 mg in 0.9% sodium chloride 10 mL injection  40 mg IntraVENous DAILY Nirav Torres NP   40 mg at 11/27/20 7718  allopurinoL (ZYLOPRIM) tablet 100 mg  100 mg Oral BID Ayad Sanches MD   100 mg at 11/27/20 0874  
 mycophenolate mofetil (CELLCEPT) capsule 250 mg  250 mg Oral ACB&D Thanh Ruiz MD   250 mg at 11/27/20 0630  zinc sulfate (ZINCATE) 220 (50) mg capsule 1 Cap  1 Cap Oral DAILY Jennifer Koenig NP   1 Cap at 11/27/20 3624  
 ascorbic acid (vitamin C) (VITAMIN C) tablet 500 mg  500 mg Oral BID Jennifer Koenig NP   500 mg at 11/27/20 1117  cholecalciferol (VITAMIN D3) (1000 Units /25 mcg) tablet 2 Tab  2,000 Units Oral DAILY Jennifer Koenig NP   2 Tab at 11/27/20 0917  
 albuterol (PROVENTIL HFA, VENTOLIN HFA, PROAIR HFA) inhaler 2 Puff  2 Puff Inhalation Q4H PRN Jennifer Koenig NP      
 zinc oxide-cod liver oil (DESITIN) 40 % paste   Topical Q8H Jf Romero DO      
 acetaminophen (TYLENOL) tablet 650 mg  650 mg Oral Q6H PRN Charan Raygoza MD   650 mg at 11/25/20 2136 Or  
 acetaminophen (TYLENOL) suppository 650 mg  650 mg Rectal Q6H PRN Charan Raygoza MD      
 methylPREDNISolone (PF) (SOLU-MEDROL) injection 40 mg  40 mg IntraVENous Q12H Charan Raygoza MD   40 mg at 11/27/20 7513  sodium chloride (NS) flush 5-40 mL  5-40 mL IntraVENous Q8H Charan Raygoza MD   10 mL at 11/27/20 0631  
 sodium chloride (NS) flush 5-40 mL  5-40 mL IntraVENous PRN Charan Raygoza MD      
 polyethylene glycol (MIRALAX) packet 17 g  17 g Oral DAILY PRN Charan Raygoza MD      
 enoxaparin (LOVENOX) injection 30 mg  30 mg SubCUTAneous Q24H Charan Raygoza MD   30 mg at 11/26/20 1859  
 0.9% sodium chloride infusion  50 mL/hr IntraVENous CONTINUOUS Reginaldo Boyer MD 50 mL/hr at 11/26/20 2243 50 mL/hr at 11/26/20 2243  ondansetron (ZOFRAN) injection 4 mg  4 mg IntraVENous Q6H PRN Charan Raygoza MD      
 amLODIPine (NORVASC) tablet 10 mg  10 mg Oral DAILY Charan Raygoza MD   10 mg at 11/27/20 4124  aspirin delayed-release tablet 81 mg  81 mg Oral DAILY Charan Raygoza MD   81 mg at 11/27/20 0916  
 melatonin tablet 3 mg  3 mg Oral QHS Charan Raygoza MD   3 mg at 11/26/20 2100  
 metoprolol tartrate (LOPRESSOR) tablet 50 mg  50 mg Oral BID Charan Dumont MD   50 mg at 11/27/20 1870  tacrolimus (PROGRAF) capsule 1 mg  1 mg Oral Q12H Charan Raygoza MD   1 mg at 11/27/20 0917  
 glucose chewable tablet 16 g  4 Tab Oral PRN Charan Raygoza MD      
 glucagon (GLUCAGEN) injection 1 mg  1 mg IntraMUSCular PRN Charan Raygoza MD      
 dextrose 10% infusion 0-250 mL  0-250 mL IntraVENous PRN Charan Raygoza MD      
 insulin lispro (HUMALOG) injection   SubCUTAneous Q6H Joseph Agarawl MD   2 Units at 11/27/20 2848  labetaloL (NORMODYNE;TRANDATE) injection 20 mg  20 mg IntraVENous Q4H PRN Charan Raygoza MD   20 mg at 11/25/20 1157 Objective:  
 
Patient Vitals for the past 24 hrs: 
 BP Temp Pulse Resp SpO2  
11/27/20 0900 (!) 176/71  61 16 99 % 11/27/20 0800 (!) 175/71  (!) 57 16 100 % 11/27/20 0700 (!) 164/75  64 22 100 % 11/27/20 0600 (!) 181/68  62 15 95 % 11/27/20 0500 (!) 184/74  74 22 92 % 11/27/20 0400 (!) 166/75 98.4 °F (36.9 °C) 60 20 98 % 11/27/20 0300 (!) 170/70  67 13 100 % 11/27/20 0200 (!) 158/65  70 23 96 % 11/27/20 0100 (!) 153/60  66 18 98 % 11/27/20 0000 (!) 190/95 98.6 °F (37 °C) 64 19 98 % 11/26/20 2300 (!) 178/82  69 20 99 % 11/26/20 2200 (!) 172/79  69 23 92 % 11/26/20 2100 (!) 171/72  66 16 97 % 11/26/20 2000 (!) 161/76 97.9 °F (36.6 °C) 73 19 99 % 11/26/20 1900 (!) 156/75  73 19 99 % 11/26/20 1800 (!) 172/72  67 17 98 % 11/26/20 1730 (!) 152/91  71 16 100 % 11/26/20 1700 (!) 152/71  67 18 99 % 11/26/20 1630 (!) 155/69  66 21 100 % 11/26/20 1615   67 21 100 % 11/26/20 1600 (!) 165/69 98.4 °F (36.9 °C) 66 13 100 % 11/26/20 1530 (!) 147/66 98 °F (36.7 °C) 65 17 99 % 11/26/20 1515 (!) 135/115 97.6 °F (36.4 °C) 66 20 99 % 11/26/20 1500 (!) 157/65  64 14 98 % 11/26/20 1400 (!) 148/118  66 15 100 % 11/26/20 1300 (!) 144/76  69 20 97 % 11/26/20 1200 (!) 140/47 98.2 °F (36.8 °C) 61 17 100 % 11/26/20 1100 (!) 150/69  (!) 59 20 98 % 11/26/20 1000 (!) 145/71  (!) 58 15 100 % Gen: NAD HEENT: PERRL, Sclerae anicteric Cv: RRR without m/r/g Pulm: CTA bilaterally Abd: unchanged incisional hernia, no redness, no incarcerated/trapped bowel. Ext: No c/c/e Available labs reviewed: 
Labs:   
Recent Results (from the past 24 hour(s)) GLUCOSE, POC Collection Time: 11/26/20 11:42 AM  
Result Value Ref Range Glucose (POC) 144 (H) 65 - 100 mg/dL Performed by John Sparrow GLUCOSE, POC Collection Time: 11/26/20  5:24 PM  
Result Value Ref Range Glucose (POC) 354 (H) 65 - 100 mg/dL Performed by John LOCK, POC  Collection Time: 11/27/20 12:06 AM  
Result Value Ref Range Glucose (POC) 336 (H) 65 - 100 mg/dL Performed by Kellie Rodriguez Collection Time: 11/27/20  4:10 AM  
Result Value Ref Range D-dimer 1.17 (H) 0.00 - 0.65 mg/L FEU RENAL FUNCTION PANEL Collection Time: 11/27/20  4:10 AM  
Result Value Ref Range Sodium 143 136 - 145 mmol/L Potassium 5.2 (H) 3.5 - 5.1 mmol/L Chloride 110 (H) 97 - 108 mmol/L  
 CO2 26 21 - 32 mmol/L Anion gap 7 5 - 15 mmol/L Glucose 143 (H) 65 - 100 mg/dL BUN 54 (H) 6 - 20 MG/DL Creatinine 1.99 (H) 0.55 - 1.02 MG/DL  
 BUN/Creatinine ratio 27 (H) 12 - 20 GFR est AA 30 (L) >60 ml/min/1.73m2 GFR est non-AA 25 (L) >60 ml/min/1.73m2 Calcium 9.0 8.5 - 10.1 MG/DL Phosphorus 2.9 2.6 - 4.7 MG/DL Albumin 2.5 (L) 3.5 - 5.0 g/dL CBC WITH AUTOMATED DIFF Collection Time: 11/27/20  4:10 AM  
Result Value Ref Range WBC 2.1 (L) 3.6 - 11.0 K/uL  
 RBC 3.67 (L) 3.80 - 5.20 M/uL  
 HGB 10.0 (L) 11.5 - 16.0 g/dL HCT 33.9 (L) 35.0 - 47.0 % MCV 92.4 80.0 - 99.0 FL  
 MCH 27.2 26.0 - 34.0 PG  
 MCHC 29.5 (L) 30.0 - 36.5 g/dL  
 RDW 16.6 (H) 11.5 - 14.5 % PLATELET 684 544 - 696 K/uL MPV 10.6 8.9 - 12.9 FL  
 NRBC 0.0 0  WBC ABSOLUTE NRBC 0.00 0.00 - 0.01 K/uL NEUTROPHILS 77 (H) 32 - 75 % LYMPHOCYTES 8 (L) 12 - 49 % MONOCYTES 14 (H) 5 - 13 % EOSINOPHILS 0 0 - 7 % BASOPHILS 0 0 - 1 % IMMATURE GRANULOCYTES 1 (H) 0.0 - 0.5 % ABS. NEUTROPHILS 1.6 (L) 1.8 - 8.0 K/UL  
 ABS. LYMPHOCYTES 0.2 (L) 0.8 - 3.5 K/UL  
 ABS. MONOCYTES 0.3 0.0 - 1.0 K/UL  
 ABS. EOSINOPHILS 0.0 0.0 - 0.4 K/UL  
 ABS. BASOPHILS 0.0 0.0 - 0.1 K/UL  
 ABS. IMM. GRANS. 0.0 0.00 - 0.04 K/UL  
 DF SMEAR SCANNED    
 RBC COMMENTS NATE CELLS 
PRESENT 
    
 RBC COMMENTS OVALOCYTES PRESENT 
    
 RBC COMMENTS ANISOCYTOSIS 1+ GLUCOSE, POC Collection Time: 11/27/20  6:28 AM  
Result Value Ref Range Glucose (POC) 146 (H) 65 - 100 mg/dL  Performed by Fely Harris   
 
 
 Assessment and Plan  
 
70 y/o woman with kidney transplant on immunosuppressives, recently diagnosed with PTLD (DLBCL, JUSTICE pending), admitted with dyspnea while port placed in anticipation for outpatient treatment. Found to be covid positive. Rituximab started last night. Now with diarrhea and abdominal pain. 1. Diarrhea/abdominal pain: multiple risk factors for C. Diff. Check stool toxin, defer empiric management to primary service. 2. PTLD: Next dose of rituximab in 20 days. No difficulty with breathing or eating at present. Difficult balance between treating lymphoma and avoiding excessive immunosuppression while infected with covid. 3. ESRD s/p kidney transplant: immunosuppressive reduced for PTLD. Thank you for allowing us to participate in the care of this very pleasant patient. Ruslan Murillo MD 
Hematology/Oncology Phone [phone]

## 2020-11-28 NOTE — PROGRESS NOTES
Hematology-Oncology Progress Note Amisha Cohn 1950 
[id number] 
11/28/2020 Subjective: No further diarrhea or abdominal pain. Does not new right ear pain today. Allergies: Benadryl [diphenhydramine hcl]; Fentanyl; Morphine; and Versed [midazolam] Current Facility-Administered Medications Medication Dose Route Frequency Provider Last Rate Last Dose  hydrALAZINE (APRESOLINE) tablet 50 mg  50 mg Oral TID Amara Hartman MD   50 mg at 11/28/20 6074  [START ON 11/29/2020] methylPREDNISolone (PF) (SOLU-MEDROL) injection 40 mg  40 mg IntraVENous DAILY Amara Hartman MD      
 tacrolimus (PROGRAF) capsule 2 mg  2 mg Oral Q12H Reginaldo Boyer MD   2 mg at 11/28/20 9252  
 oxyCODONE IR (ROXICODONE) tablet 5 mg  5 mg Oral Q6H PRN Amara Hartman MD   5 mg at 11/28/20 0902  
 glucose chewable tablet 16 g  4 Tab Oral PRN Amara Hartman MD      
 glucagon (GLUCAGEN) injection 1 mg  1 mg IntraMUSCular PRN Amara Hartman MD      
 dextrose 10% infusion 0-250 mL  0-250 mL IntraVENous PRN Amara Hartman MD      
 insulin lispro (HUMALOG) injection   SubCUTAneous AC&HS Amara Hartman MD   7 Units at 11/28/20 1338  hydrOXYzine HCL (ATARAX) tablet 10 mg  10 mg Oral TID PRN Zabrina Martins, NP   10 mg at 11/26/20 1014  [Held by provider] piperacillin-tazobactam (ZOSYN) 3.375 g in 0.9% sodium chloride (MBP/ADV) 100 mL MBP  3.375 g IntraVENous Q8H Zabrina Martins, NP 25 mL/hr at 11/28/20 0157 3.375 g at 11/28/20 0157  pantoprazole (PROTONIX) 40 mg in 0.9% sodium chloride 10 mL injection  40 mg IntraVENous DAILY Sofie Kayser, NP   40 mg at 11/28/20 7180  allopurinoL (ZYLOPRIM) tablet 100 mg  100 mg Oral BID Camille Valles MD   100 mg at 11/28/20 3433  
 mycophenolate mofetil (CELLCEPT) capsule 250 mg  250 mg Oral ACB&D Angie Ochoa MD   250 mg at 11/28/20 9015  zinc sulfate (ZINCATE) 220 (50) mg capsule 1 Cap  1 Cap Oral DAILY Virlinkaila Martins NP   1 Cap at 11/28/20 8594  ascorbic acid (vitamin C) (VITAMIN C) tablet 500 mg  500 mg Oral BID Zabrina Martins, NP   500 mg at 11/28/20 1097  cholecalciferol (VITAMIN D3) (1000 Units /25 mcg) tablet 2 Tab  2,000 Units Oral DAILY Zabrina Martins, NP   2 Tab at 11/28/20 0917  
 albuterol (PROVENTIL HFA, VENTOLIN HFA, PROAIR HFA) inhaler 2 Puff  2 Puff Inhalation Q4H PRN Zabrina Martins, NP      
 zinc oxide-cod liver oil (DESITIN) 40 % paste   Topical Q8H Jf Romero DO      
 acetaminophen (TYLENOL) tablet 650 mg  650 mg Oral Q6H PRN Charan Moore MD   650 mg at 11/28/20 8165 Or  acetaminophen (TYLENOL) suppository 650 mg  650 mg Rectal Q6H PRN Charan Raygoza MD      
 sodium chloride (NS) flush 5-40 mL  5-40 mL IntraVENous Q8H Charan Raygoza MD   10 mL at 11/28/20 1339  
 sodium chloride (NS) flush 5-40 mL  5-40 mL IntraVENous PRN Charan Raygoza MD      
 polyethylene glycol (MIRALAX) packet 17 g  17 g Oral DAILY PRN Charan Raygoza MD      
 enoxaparin (LOVENOX) injection 30 mg  30 mg SubCUTAneous Q24H Charan Raygoza MD   30 mg at 11/27/20 1817  
 0.9% sodium chloride infusion  50 mL/hr IntraVENous CONTINUOUS Reginaldo Boyer MD 50 mL/hr at 11/26/20 2243 50 mL/hr at 11/26/20 2243  ondansetron (ZOFRAN) injection 4 mg  4 mg IntraVENous Q6H PRN Charan Raygoza MD      
 amLODIPine (NORVASC) tablet 10 mg  10 mg Oral DAILY Charan Raygoza MD   10 mg at 11/28/20 7028  aspirin delayed-release tablet 81 mg  81 mg Oral DAILY Charan Raygoza MD   81 mg at 11/28/20 0917  
 melatonin tablet 3 mg  3 mg Oral QHS Charan Raygoza MD   3 mg at 11/27/20 2220  
 metoprolol tartrate (LOPRESSOR) tablet 50 mg  50 mg Oral BID Charan Moore MD   50 mg at 11/28/20 7784  labetaloL (NORMODYNE;TRANDATE) injection 20 mg  20 mg IntraVENous Q4H PRN Charan Raygoza MD   20 mg at 11/25/20 1152 Objective:  
 
Patient Vitals for the past 24 hrs: 
 BP Temp Pulse Resp SpO2 Weight 20 1052 (!) 158/58  65     
20 0833 (!) 183/68 97.6 °F (36.4 °C) 64 20 98 %   
20 0700 (!) 181/82  65     
20 0614 (!) 196/89  65     
20 0203 (!) 198/81 97.6 °F (36.4 °C) (!) 55 18 99 %   
20 2220 (!) 171/78  99     
20 2030 (!) 167/81 98.1 °F (36.7 °C) 62 20 97 % 80.5 kg (177 lb 7.5 oz)  
20 1817   63     
20 1504 (!) 168/76 97.4 °F (36.3 °C) 62 20 98 %  Gen: NAD HEENT: PERRL, Sclerae anicteric. Unchanged right cervical LAD. Cv: RRR without m/r/g Pulm: CTA bilaterally Abd: NABS, NTND, No HSM Ext: No c/c/e Available labs reviewed: 
Labs:   
Recent Results (from the past 24 hour(s)) GLUCOSE, POC Collection Time: 20  6:00 PM  
Result Value Ref Range Glucose (POC) 343 (H) 65 - 100 mg/dL Performed by Lynette Ohara GLUCOSE, POC Collection Time: 20 12:00 AM  
Result Value Ref Range Glucose (POC) 163 (H) 65 - 100 mg/dL Performed by Rashmi Barragan TACROLIMUS, WHOLE BLOOD Collection Time: 20  2:22 AM  
Result Value Ref Range Tacrolimus 2.8 (L) Ther Rn.0-15.0 ng/mL CBC WITH AUTOMATED DIFF Collection Time: 20  2:22 AM  
Result Value Ref Range WBC 3.6 3.6 - 11.0 K/uL  
 RBC 4.22 3.80 - 5.20 M/uL  
 HGB 11.5 11.5 - 16.0 g/dL HCT 38.7 35.0 - 47.0 % MCV 91.7 80.0 - 99.0 FL  
 MCH 27.3 26.0 - 34.0 PG  
 MCHC 29.7 (L) 30.0 - 36.5 g/dL  
 RDW 16.7 (H) 11.5 - 14.5 % PLATELET 800 554 - 106 K/uL MPV 10.5 8.9 - 12.9 FL  
 NRBC 0.0 0  WBC ABSOLUTE NRBC 0.00 0.00 - 0.01 K/uL NEUTROPHILS 91 (H) 32 - 75 % LYMPHOCYTES 5 (L) 12 - 49 % MONOCYTES 3 (L) 5 - 13 % EOSINOPHILS 0 0 - 7 % BASOPHILS 0 0 - 1 % IMMATURE GRANULOCYTES 1 (H) 0.0 - 0.5 % ABS. NEUTROPHILS 3.3 1.8 - 8.0 K/UL  
 ABS. LYMPHOCYTES 0.2 (L) 0.8 - 3.5 K/UL  
 ABS. MONOCYTES 0.1 0.0 - 1.0 K/UL  
 ABS. EOSINOPHILS 0.0 0.0 - 0.4 K/UL  
 ABS.  BASOPHILS 0.0 0.0 - 0.1 K/UL  
 ABS. IMM. GRANS. 0.0 0.00 - 0.04 K/UL  
 DF SMEAR SCANNED    
 RBC COMMENTS ANISOCYTOSIS 1+ 
    
 RBC COMMENTS MACROCYTOSIS 
1+ METABOLIC PANEL, COMPREHENSIVE Collection Time: 11/28/20  2:22 AM  
Result Value Ref Range Sodium 138 136 - 145 mmol/L Potassium 5.8 (H) 3.5 - 5.1 mmol/L Chloride 109 (H) 97 - 108 mmol/L  
 CO2 22 21 - 32 mmol/L Anion gap 7 5 - 15 mmol/L Glucose 151 (H) 65 - 100 mg/dL BUN 55 (H) 6 - 20 MG/DL Creatinine 1.85 (H) 0.55 - 1.02 MG/DL  
 BUN/Creatinine ratio 30 (H) 12 - 20 GFR est AA 33 (L) >60 ml/min/1.73m2 GFR est non-AA 27 (L) >60 ml/min/1.73m2 Calcium 9.7 8.5 - 10.1 MG/DL Bilirubin, total 0.4 0.2 - 1.0 MG/DL  
 ALT (SGPT) 9 (L) 12 - 78 U/L  
 AST (SGOT) 29 15 - 37 U/L Alk. phosphatase 95 45 - 117 U/L Protein, total 6.8 6.4 - 8.2 g/dL Albumin 2.5 (L) 3.5 - 5.0 g/dL Globulin 4.3 (H) 2.0 - 4.0 g/dL A-G Ratio 0.6 (L) 1.1 - 2.2 PROCALCITONIN Collection Time: 11/28/20  2:22 AM  
Result Value Ref Range Procalcitonin 0.05 ng/mL D DIMER Collection Time: 11/28/20  2:24 AM  
Result Value Ref Range D-dimer 1.20 (H) 0.00 - 0.65 mg/L FEU  
GLUCOSE, POC Collection Time: 11/28/20  6:57 AM  
Result Value Ref Range Glucose (POC) 153 (H) 65 - 100 mg/dL Performed by Abbe Schuler GLUCOSE, POC Collection Time: 11/28/20 12:19 PM  
Result Value Ref Range Glucose (POC) 301 (H) 65 - 100 mg/dL Performed by Anna Lynn Assessment and Plan  
 
68 y/o woman with ESRD s/p renal transplant, now with PTLD, admitted with difficulty breathing, noted to have COVID. 1. PTLD: rituximab given 11/26, next dose 12/17/20. Not a candidate for cytotoxic chemotherapy given concurrent COVID infection. Not currently dyspnea, tolerating solid food. 2. COVID infection: sat'ing well. 3. ESRD s/p transplant: would favor keeping immunosuppression on the lower end given PTLD associated with immunosuppression. 4. Disposition: attempted to reach Damián Gant, her spouse at [phone]. He explains that they live together, but that her being admitted to the hospital, being diagnosed with lymphoma and having Laura Skill is all new information to him. Will likely need a stay in rehab on discharge with outpatient f/u with my partner Dr. Sumaya Mejia for subseqeunt cancer treatment. Thank you for allowing us to participate in the care of this very pleasant patient. Melida Alvarenga MD 
Hematology/Oncology Phone [phone]

## 2020-11-28 NOTE — PROGRESS NOTES
Problem: Falls - Risk of 
Goal: *Absence of Falls Description: Document Jethro Still Fall Risk and appropriate interventions in the flowsheet. Outcome: Progressing Towards Goal 
Note: Fall Risk Interventions: 
  
 
  
Medication Interventions: Evaluate medications/consider consulting pharmacy Elimination Interventions: Call light in reach, Toileting schedule/hourly rounds Problem: Pressure Injury - Risk of 
Goal: *Prevention of pressure injury Description: Document Bi Scale and appropriate interventions in the flowsheet. Outcome: Progressing Towards Goal 
Note: Pressure Injury Interventions: 
Sensory Interventions: Keep linens dry and wrinkle-free, Minimize linen layers Moisture Interventions: Absorbent underpads Activity Interventions: Pressure redistribution bed/mattress(bed type) Mobility Interventions: HOB 30 degrees or less Nutrition Interventions: Offer support with meals,snacks and hydration Friction and Shear Interventions: Apply protective barrier, creams and emollients Problem: Breathing Pattern - Ineffective Goal: *Absence of hypoxia Outcome: Progressing Towards Goal 
Goal: *Use of effective breathing techniques Outcome: Progressing Towards Goal 
Goal: *PALLIATIVE CARE:  Alleviation of Dyspnea Outcome: Progressing Towards Goal 
  
Problem: Pain Goal: *Control of Pain Outcome: Progressing Towards Goal

## 2020-11-28 NOTE — PROGRESS NOTES
[facility] [facility] Adult  Hospitalist Group Hospitalist Progress Note Nilesh Sheikh MD 
Answering service: [phone] OR [phone] from in house phone Date of Service:  2020 NAME:  Janny Stanley :  1950 MRN:  [id number] Admission Summary:  
Per H and P \" 23-year-old female with multiple medical problem was recently diagnosed with diffuse large cell lymphoma late 2020 after tonsillar biopsy. She did not start treatment yet. For the last few days she noticed intermittent chills and fever\". Interval history / Subjective:  
 Patient seen and examined  She is slow to respond. She complained of neck pain and back pain today. Still has diarrhea. Assessment & Plan: # COVID 19 positive 
-CXR no obvious infiltrates 
-she is at baseline oxygen 2 lt. 
-procal wnl. # ESRD s/p renal transplant 
-on cellcept ,tacrolimus and steroids- can switch to solumedrol to prednisone. # PTLD 
-received rituximab recently #Diarrhea -likely COVID 19 related gastroenteritis vs  C diff (immunocompromsied) vs nutritional supplements 
-C diff testing as she is immunocompromised 
-Hold zosyn,no indication for antibiotics #Acute on chronic kidney disease: #Hyperkalemia 
-renal function stable. 
-changed to low potassium diet and supplements #Chronic hypoxic resp failure, COPD 
-prn duonebs Chronic anemia: 
-hb stable. #HTN: 
-continue current regimen. Code status: full DVT prophylaxis: lovenox Care Plan discussed with:patient,nurse. Spoke to patient's daughters over phone. Anticipated Disposition: tbd Anticipated Discharge:tbd Hospital Problems  Date Reviewed: 2020 Codes Class Noted POA Post-transplant lymphoproliferative disorder [facility]) ICD-10-CM: T86.99, D47.Z1 
ICD-9-CM: 996.80, 238.77  2020 Unknown  Lymphoma ([facility] [address] ICD-10-CM: C85.90 ICD-9-CM: 202.80  11/24/2020 Unknown Review of Systems: As per HPI Vital Signs:  
 Last 24hrs VS reviewed since prior progress note. Most recent are: 
Visit Vitals BP (!) 155/68 (BP 1 Location: Left arm, BP Patient Position: At rest) Pulse 60 Temp (!) 96.7 °F (35.9 °C) Resp 20 Ht 5' 3\" (1.6 m) Wt 80.5 kg (177 lb 7.5 oz) SpO2 98% BMI 31.44 kg/m² Intake/Output Summary (Last 24 hours) at 11/28/2020 1712 Last data filed at 11/28/2020 1512 Gross per 24 hour Intake 880 ml Output 3050 ml Net -2170 ml Physical Examination:  
 
I had a face to face encounter with this patient and independently examined them on 11/28/2020 as outlined below: 
 
     
Constitutional:  No acute distress Resp:  CTA bilaterally. No wheezing/rhonchi/rales. No accessory muscle use CV:  Regular rhythm, normal rate, no murmurs GI:  Soft, non distended,non tender, ventral hernia +, Musculoskeletal:  No LE edema Neurologic:  Moves all extremities. Awake and alert. Psych:  Not anxious nor agitated. Data Review:  
 Review and/or order of clinical lab test 
Review and/or order of tests in the medicine section of CPT Labs:  
 
Recent Labs  
  11/28/20 0222 11/27/20 0410 WBC 3.6 2.1* HGB 11.5 10.0* HCT 38.7 33.9*  
 193 Recent Labs  
  11/28/20 0222 11/27/20 0410 11/26/20 0349 11/26/20 
5291  143 138 139  
K 5.8* 5.2* 5.1 5.2*  
* 110* 104 105 CO2 22 26 27 26 BUN 55* 54* 51* 49* CREA 1.85* 1.99* 2.01* 2.05* * 143* 177* 178* CA 9.7 9.0 8.9 9.0 MG  --   --   --  2.0 PHOS  --  2.9 3.5 3.5 Recent Labs  
  11/28/20 0222 11/27/20 0410 11/26/20 
5463 ALT 9*  --   --   
AP 95  --   --   
TBILI 0.4  --   --   
TP 6.8  --   --   
ALB 2.5* 2.5* 2.7*  
GLOB 4.3*  --   -- No results for input(s): INR, PTP, APTT, INREXT, INREXT in the last 72 hours.   
No results for input(s): FE, TIBC, PSAT, FERR in the last 72 hours. No results found for: FOL, RBCF No results for input(s): PH, PCO2, PO2 in the last 72 hours. No results for input(s): CPK, CKNDX, TROIQ in the last 72 hours. No lab exists for component: CPKMB No results found for: CHOL, CHOLX, CHLST, CHOLV, HDL, HDLP, LDL, LDLC, DLDLP, TGLX, TRIGL, TRIGP, CHHD, CHHDX Lab Results Component Value Date/Time Glucose (POC) 301 (H) 11/28/2020 12:19 PM  
 Glucose (POC) 153 (H) 11/28/2020 06:57 AM  
 Glucose (POC) 163 (H) 11/28/2020 12:00 AM  
 Glucose (POC) 343 (H) 11/27/2020 06:00 PM  
 Glucose (POC) 273 (H) 11/27/2020 12:46 PM  
 
Lab Results Component Value Date/Time Color Yellow/Straw 11/24/2020 11:50 AM  
 Appearance Clear 11/24/2020 11:50 AM  
 Specific gravity 1.009 11/24/2020 11:50 AM  
 pH (UA) 7.0 11/24/2020 11:50 AM  
 Protein 100 (A) 11/24/2020 11:50 AM  
 Glucose 50 (A) 11/24/2020 11:50 AM  
 Ketone Negative 11/24/2020 11:50 AM  
 Bilirubin Negative 11/24/2020 11:50 AM  
 Urobilinogen 0.1 11/24/2020 11:50 AM  
 Nitrites Negative 11/24/2020 11:50 AM  
 Leukocyte Esterase Negative 11/24/2020 11:50 AM  
 Bacteria Negative 11/24/2020 11:50 AM  
 WBC 0-4 11/24/2020 11:50 AM  
 RBC 0-5 11/24/2020 11:50 AM  
 
 
 
Medications Reviewed:  
 
Current Facility-Administered Medications Medication Dose Route Frequency  hydrALAZINE (APRESOLINE) tablet 50 mg  50 mg Oral TID  [START ON 11/29/2020] methylPREDNISolone (PF) (SOLU-MEDROL) injection 40 mg  40 mg IntraVENous DAILY  tacrolimus (PROGRAF) capsule 2 mg  2 mg Oral Q12H  
 oxyCODONE IR (ROXICODONE) tablet 5 mg  5 mg Oral Q6H PRN  
 glucose chewable tablet 16 g  4 Tab Oral PRN  
 glucagon (GLUCAGEN) injection 1 mg  1 mg IntraMUSCular PRN  
 dextrose 10% infusion 0-250 mL  0-250 mL IntraVENous PRN  
 insulin lispro (HUMALOG) injection   SubCUTAneous AC&HS  hydrOXYzine HCL (ATARAX) tablet 10 mg  10 mg Oral TID PRN  
 [Held by provider] piperacillin-tazobactam (ZOSYN) 3.375 g in 0.9% sodium chloride (MBP/ADV) 100 mL MBP  3.375 g IntraVENous Q8H  
 pantoprazole (PROTONIX) 40 mg in 0.9% sodium chloride 10 mL injection  40 mg IntraVENous DAILY  allopurinoL (ZYLOPRIM) tablet 100 mg  100 mg Oral BID  mycophenolate mofetil (CELLCEPT) capsule 250 mg  250 mg Oral ACB&D  
 zinc sulfate (ZINCATE) 220 (50) mg capsule 1 Cap  1 Cap Oral DAILY  ascorbic acid (vitamin C) (VITAMIN C) tablet 500 mg  500 mg Oral BID  cholecalciferol (VITAMIN D3) (1000 Units /25 mcg) tablet 2 Tab  2,000 Units Oral DAILY  albuterol (PROVENTIL HFA, VENTOLIN HFA, PROAIR HFA) inhaler 2 Puff  2 Puff Inhalation Q4H PRN  zinc oxide-cod liver oil (DESITIN) 40 % paste   Topical Q8H  
 acetaminophen (TYLENOL) tablet 650 mg  650 mg Oral Q6H PRN Or  
 acetaminophen (TYLENOL) suppository 650 mg  650 mg Rectal Q6H PRN  
 sodium chloride (NS) flush 5-40 mL  5-40 mL IntraVENous Q8H  
 sodium chloride (NS) flush 5-40 mL  5-40 mL IntraVENous PRN  polyethylene glycol (MIRALAX) packet 17 g  17 g Oral DAILY PRN  
 enoxaparin (LOVENOX) injection 30 mg  30 mg SubCUTAneous Q24H  
 0.9% sodium chloride infusion  50 mL/hr IntraVENous CONTINUOUS  
 ondansetron (ZOFRAN) injection 4 mg  4 mg IntraVENous Q6H PRN  
 amLODIPine (NORVASC) tablet 10 mg  10 mg Oral DAILY  aspirin delayed-release tablet 81 mg  81 mg Oral DAILY  melatonin tablet 3 mg  3 mg Oral QHS  metoprolol tartrate (LOPRESSOR) tablet 50 mg  50 mg Oral BID  labetaloL (NORMODYNE;TRANDATE) injection 20 mg  20 mg IntraVENous Q4H PRN  
 
______________________________________________________________________ EXPECTED LENGTH OF STAY: 5d 12h ACTUAL LENGTH OF STAY:          4 Yas Morris MD

## 2020-11-28 NOTE — PROGRESS NOTES
Problem: Falls - Risk of 
Goal: *Absence of Falls Description: Document Sangita Soto Fall Risk and appropriate interventions in the flowsheet. Outcome: Progressing Towards Goal 
Note: Fall Risk Interventions: 
  
 
  
 
Medication Interventions: Patient to call before getting OOB Elimination Interventions: Call light in reach Problem: Patient Education: Go to Patient Education Activity Goal: Patient/Family Education Outcome: Progressing Towards Goal 
  
Problem: Pressure Injury - Risk of 
Goal: *Prevention of pressure injury Description: Document Bi Scale and appropriate interventions in the flowsheet. Outcome: Progressing Towards Goal 
Note: Pressure Injury Interventions: 
Sensory Interventions: Assess changes in LOC Moisture Interventions: Absorbent underpads, Assess need for specialty bed, Check for incontinence Q2 hours and as needed Activity Interventions: Pressure redistribution bed/mattress(bed type) Mobility Interventions: HOB 30 degrees or less, Pressure redistribution bed/mattress (bed type), Float heels Nutrition Interventions: Document food/fluid/supplement intake Friction and Shear Interventions: Apply protective barrier, creams and emollients, HOB 30 degrees or less Problem: Patient Education: Go to Patient Education Activity Goal: Patient/Family Education Outcome: Progressing Towards Goal 
  
Problem: Airway Clearance - Ineffective Goal: *Patent airway Outcome: Progressing Towards Goal 
Goal: *Absence of airway secretions Outcome: Progressing Towards Goal 
Goal: *Able to cough effectively Outcome: Progressing Towards Goal 
Goal: *PALLIATIVE CARE:  Alleviation of secretions, cough and/or nasal congestion Outcome: Progressing Towards Goal 
  
Problem: Patient Education: Go to Patient Education Activity Goal: Patient/Family Education Outcome: Progressing Towards Goal 
  
Problem: Breathing Pattern - Ineffective Goal: *Absence of hypoxia Outcome: Progressing Towards Goal 
Goal: *Use of effective breathing techniques Outcome: Progressing Towards Goal 
Goal: *PALLIATIVE CARE:  Alleviation of Dyspnea Outcome: Progressing Towards Goal 
  
Problem: Patient Education: Go to Patient Education Activity Goal: Patient/Family Education Outcome: Progressing Towards Goal 
  
Problem: Patient Education: Go to Patient Education Activity Goal: Patient/Family Education Outcome: Progressing Towards Goal 
  
Problem: Nutrition Deficit Goal: *Optimize nutritional status Outcome: Progressing Towards Goal 
  
Problem: Risk for Spread of Infection Goal: Prevent transmission of infectious organism to others Description: Prevent the transmission of infectious organisms to other patients, staff members, and visitors. Outcome: Progressing Towards Goal 
  
Problem: Patient Education:  Go to Education Activity Goal: Patient/Family Education Outcome: Progressing Towards Goal 
  
Problem: Discharge Planning Goal: *Discharge to safe environment Outcome: Progressing Towards Goal 
Goal: *Knowledge of medication management Outcome: Progressing Towards Goal 
Goal: *Knowledge of discharge instructions Outcome: Progressing Towards Goal 
  
Problem: Patient Education: Go to Patient Education Activity Goal: Patient/Family Education Outcome: Progressing Towards Goal

## 2020-11-28 NOTE — PROGRESS NOTES
Pt has not voided since 8:00am. Bladder scan showed 724ml. Straight cath per protocol. Pt tolerated well. Output of 700ml.

## 2020-11-28 NOTE — ROUTINE PROCESS
Verbal shift change report given to [employer] rn (oncoming nurse) by Darvin Leiva (offgoing nurse). Report included the following information SBAR and Kardex.

## 2020-11-28 NOTE — PROGRESS NOTES
Nephrology Progress Note Kush Better Date of Admission : 11/24/2020 CC: Follow up for  Renal Tx Assessment and Plan Renal Transplant: 
- cont current IS meds - MMF, prograf, IV solumedrol - prograf level Low. Increased tac dose to 2mg BID 
- continue IVF @ 50 cc/ hr  
- daily labs Hyperkalemia: 
-low K diet  
- one more dose of veltassa 
  
CKD IV: 
- baseline Cr appears to be around 2 COVID-19 
  
Diffuse large B cell lymphoma Leucopenia  
- s/p Rituximab 11/26 HTN: 
- stable  
  
DM2: 
- on insulin Interval History: 
Transferred out of ICU  
K high On high K diet Cr down to 1.8 Tolerated Ritux infusion Current Medications: all current  Medications have been eviewed in [facility] Review of Systems: Pertinent items are noted in HPI. Objective: 
Vitals:   
Vitals:  
 11/28/20 0614 11/28/20 0700 11/28/20 0833 11/28/20 1052 BP: (!) 196/89 (!) 181/82 (!) 183/68 (!) 158/58 Pulse: 65 65 64 65 Resp:   20 Temp:   97.6 °F (36.4 °C) SpO2:   98% Weight:      
Height:      
 
Intake and Output: 
No intake/output data recorded. 11/26 1901 - 11/28 0700 In: 2155 [P.O.:120; I.V.:1025] Out: 2600 [Urine:2600] Physical Examination:  
 
General: NAD HEENT : NAD 
NECK : SUPPLE Resp:  Stable oxygenation CV:  RRR on monitor, no edema Neurologic:  Non focal 
:  No knox []    High complexity decision making was performed 
[]    Patient is at high-risk of decompensation with multiple organ involvement Lab Data Personally Reviewed: I have reviewed all the pertinent labs, microbiology data and radiology studies during assessment. Recent Labs  
  11/28/20 
0222 11/27/20 
0410 11/26/20 
0349 11/26/20 
7075  143 138 139  
K 5.8* 5.2* 5.1 5.2*  
* 110* 104 105 CO2 22 26 27 26 * 143* 177* 178* BUN 55* 54* 51* 49* CREA 1.85* 1.99* 2.01* 2.05* CA 9.7 9.0 8.9 9.0 MG  --   --   --  2.0 PHOS  --  2.9 3.5 3.5 ALB 2.5* 2.5* 2.7*  -- ALT 9*  --   --   --   
 
Recent Labs  
  11/28/20 
0222 11/27/20 
0410 11/26/20 
0348 WBC 3.6 2.1* 2.0*  
HGB 11.5 10.0* 9.8* HCT 38.7 33.9* 32.7*  
 193 198 No results found for: SDES Lab Results Component Value Date/Time Culture result: MRSA NOT PRESENT 11/25/2020 12:11 PM  
 Culture result:  11/25/2020 12:11 PM  
      Screening of patient nares for MRSA is for surveillance purposes and, if positive, to facilitate isolation considerations in high risk settings. It is not intended for automatic decolonization interventions per se as regimens are not sufficiently effective to warrant routine use. Culture result: No growth 2 days 11/24/2020 10:24 AM  
 
Recent Results (from the past 24 hour(s)) GLUCOSE, POC Collection Time: 11/27/20 12:46 PM  
Result Value Ref Range Glucose (POC) 273 (H) 65 - 100 mg/dL Performed by Giorgio CrowdHallo GLUCOSE, POC Collection Time: 11/27/20  6:00 PM  
Result Value Ref Range Glucose (POC) 343 (H) 65 - 100 mg/dL Performed by AchieveMintcaryn CrowdHallo GLUCOSE, POC Collection Time: 11/28/20 12:00 AM  
Result Value Ref Range Glucose (POC) 163 (H) 65 - 100 mg/dL Performed by Nina Ward CBC WITH AUTOMATED DIFF Collection Time: 11/28/20  2:22 AM  
Result Value Ref Range WBC 3.6 3.6 - 11.0 K/uL  
 RBC 4.22 3.80 - 5.20 M/uL  
 HGB 11.5 11.5 - 16.0 g/dL HCT 38.7 35.0 - 47.0 % MCV 91.7 80.0 - 99.0 FL  
 MCH 27.3 26.0 - 34.0 PG  
 MCHC 29.7 (L) 30.0 - 36.5 g/dL  
 RDW 16.7 (H) 11.5 - 14.5 % PLATELET 003 353 - 041 K/uL MPV 10.5 8.9 - 12.9 FL  
 NRBC 0.0 0  WBC ABSOLUTE NRBC 0.00 0.00 - 0.01 K/uL NEUTROPHILS 91 (H) 32 - 75 % LYMPHOCYTES 5 (L) 12 - 49 % MONOCYTES 3 (L) 5 - 13 % EOSINOPHILS 0 0 - 7 % BASOPHILS 0 0 - 1 % IMMATURE GRANULOCYTES 1 (H) 0.0 - 0.5 % ABS. NEUTROPHILS 3.3 1.8 - 8.0 K/UL  
 ABS. LYMPHOCYTES 0.2 (L) 0.8 - 3.5 K/UL  
 ABS. MONOCYTES 0.1 0.0 - 1.0 K/UL  
 ABS.  EOSINOPHILS 0.0 0.0 - 0.4 K/UL  
 ABS. BASOPHILS 0.0 0.0 - 0.1 K/UL  
 ABS. IMM. GRANS. 0.0 0.00 - 0.04 K/UL  
 DF SMEAR SCANNED    
 RBC COMMENTS ANISOCYTOSIS 1+ 
    
 RBC COMMENTS MACROCYTOSIS 
1+ METABOLIC PANEL, COMPREHENSIVE Collection Time: 11/28/20  2:22 AM  
Result Value Ref Range Sodium 138 136 - 145 mmol/L Potassium 5.8 (H) 3.5 - 5.1 mmol/L Chloride 109 (H) 97 - 108 mmol/L  
 CO2 22 21 - 32 mmol/L Anion gap 7 5 - 15 mmol/L Glucose 151 (H) 65 - 100 mg/dL BUN 55 (H) 6 - 20 MG/DL Creatinine 1.85 (H) 0.55 - 1.02 MG/DL  
 BUN/Creatinine ratio 30 (H) 12 - 20 GFR est AA 33 (L) >60 ml/min/1.73m2 GFR est non-AA 27 (L) >60 ml/min/1.73m2 Calcium 9.7 8.5 - 10.1 MG/DL Bilirubin, total 0.4 0.2 - 1.0 MG/DL  
 ALT (SGPT) 9 (L) 12 - 78 U/L  
 AST (SGOT) 29 15 - 37 U/L Alk. phosphatase 95 45 - 117 U/L Protein, total 6.8 6.4 - 8.2 g/dL Albumin 2.5 (L) 3.5 - 5.0 g/dL Globulin 4.3 (H) 2.0 - 4.0 g/dL A-G Ratio 0.6 (L) 1.1 - 2.2 PROCALCITONIN Collection Time: 11/28/20  2:22 AM  
Result Value Ref Range Procalcitonin 0.05 ng/mL D DIMER Collection Time: 11/28/20  2:24 AM  
Result Value Ref Range D-dimer 1.20 (H) 0.00 - 0.65 mg/L FEU  
GLUCOSE, POC Collection Time: 11/28/20  6:57 AM  
Result Value Ref Range Glucose (POC) 153 (H) 65 - 100 mg/dL Performed by Placido Bush MD 
[address] [place] [address], [address] [place] [address] Phone - [phone] Fax - [phone] 
[email or web address]

## 2020-11-28 NOTE — PROGRESS NOTES
[facility] Adult  Hospitalist Group Hospitalist Progress Note Jayson Rowan MD 
Answering service: [phone] OR [phone] from in house phone Date of Service:  2020 NAME:  Barbara Liang :  1950 MRN:  [id number] Admission Summary:  
Per H and P \" 79-year-old female with multiple medical problem was recently diagnosed with diffuse large cell lymphoma late 2020 after tonsillar biopsy. She did not start treatment yet. For the last few days she noticed intermittent chills and fever\". Interval history / Subjective:  
 Patient seen and examined  States that she has abdominal cramps. Had some loose stools for few days. Assessment & Plan: # COVID 19 positive 
-CXR no obvious infiltrates today  
-she is at baseline oxygen 2 lt. -on steroids. 
-check procal tomorrow, # ESRD s/p renal transplant 
-on cellcept ,tacrolimus. # PTLD 
-received rituximab recently #Diarrhea -likely COVID 19 related gastroenteritis 
-C diff testing as she is immunocompromised 
-Zosyn was started by ICU team. 
 
#Acute on chronic kidney disease: 
-renal function stable. #Chronic hypoxic resp failure, COPD 
-prn duonebs Chronic anemia: 
-hb stable. #HTN: 
-continue current regimen. Code status: full DVT prophylaxis: lovenox Care Plan discussed with:patient,nurse Anticipated Disposition: tbd Anticipated Discharge:tbd Hospital Problems  Date Reviewed: 2020 Codes Class Noted POA Post-transplant lymphoproliferative disorder [facility]) ICD-10-CM: T86.99, D47.Z1 
ICD-9-CM: 996.80, 238.77  2020 Unknown Lymphoma ([facility] [address] ICD-10-CM: C85.90 ICD-9-CM: 202.80  2020 Unknown Review of Systems: As per HPI Vital Signs:  
 Last 24hrs VS reviewed since prior progress note. Most recent are: 
Visit Vitals BP (!) 167/81 (BP 1 Location: Right arm, BP Patient Position: At rest) Pulse 62 Temp 98.1 °F (36.7 °C) Resp 20 Ht 5' 3\" (1.6 m) Wt 80.5 kg (177 lb 7.5 oz) SpO2 97% BMI 31.44 kg/m² Intake/Output Summary (Last 24 hours) at 11/27/2020 2107 Last data filed at 11/27/2020 1100 Gross per 24 hour Intake 1020 ml Output 700 ml Net 320 ml Physical Examination:  
 
I had a face to face encounter with this patient and independently examined them on 11/27/2020 as outlined below: 
 
     
Constitutional:  No acute distress Resp:  CTA bilaterally. No wheezing/rhonchi/rales. No accessory muscle use CV:  Regular rhythm, normal rate, no murmurs GI:  Soft, non distended,ventral hernia +, Musculoskeletal:  No LE edema Neurologic:  Moves all extremities. AAOx3 Psych:  Not anxious nor agitated. Data Review:  
 Review and/or order of clinical lab test 
Review and/or order of tests in the radiology section of CPT Review and/or order of tests in the medicine section of CPT Labs:  
 
Recent Labs  
  11/27/20 0410 11/26/20 
1892 WBC 2.1* 2.0*  
HGB 10.0* 9.8* HCT 33.9* 32.7*  
 198 Recent Labs  
  11/27/20 0410 11/26/20 0349 11/26/20 0348 11/25/20 
1244  138 139 139  
K 5.2* 5.1 5.2* 5.1 * 104 105 102 CO2 26 27 26 29 BUN 54* 51* 49* 43* CREA 1.99* 2.01* 2.05* 2.18* * 177* 178* 161* CA 9.0 8.9 9.0 9.3 MG  --   --  2.0  --   
PHOS 2.9 3.5 3.5 4.2 URICA  --   --   --  10.1* Recent Labs  
  11/27/20 0410 11/26/20 0349 11/25/20 
1244 11/25/20 
0405 ALT  --   --   --  12  
AP  --   --   --  113 TBILI  --   --   --  0.6 TP  --   --   --  7.0 ALB 2.5* 2.7* 2.9* 2.7*  
GLOB  --   --   --  4.3* No results for input(s): INR, PTP, APTT, INREXT in the last 72 hours. No results for input(s): FE, TIBC, PSAT, FERR in the last 72 hours. No results found for: FOL, RBCF No results for input(s): PH, PCO2, PO2 in the last 72 hours.  
No results for input(s): CPK, CKNDX, TROIQ in the last 72 hours. No lab exists for component: CPKMB No results found for: CHOL, CHOLX, CHLST, CHOLV, HDL, HDLP, LDL, LDLC, DLDLP, TGLX, TRIGL, TRIGP, CHHD, CHHDX Lab Results Component Value Date/Time Glucose (POC) 343 (H) 11/27/2020 06:00 PM  
 Glucose (POC) 273 (H) 11/27/2020 12:46 PM  
 Glucose (POC) 146 (H) 11/27/2020 06:28 AM  
 Glucose (POC) 336 (H) 11/27/2020 12:06 AM  
 Glucose (POC) 354 (H) 11/26/2020 05:24 PM  
 
Lab Results Component Value Date/Time Color Yellow/Straw 11/24/2020 11:50 AM  
 Appearance Clear 11/24/2020 11:50 AM  
 Specific gravity 1.009 11/24/2020 11:50 AM  
 pH (UA) 7.0 11/24/2020 11:50 AM  
 Protein 100 (A) 11/24/2020 11:50 AM  
 Glucose 50 (A) 11/24/2020 11:50 AM  
 Ketone Negative 11/24/2020 11:50 AM  
 Bilirubin Negative 11/24/2020 11:50 AM  
 Urobilinogen 0.1 11/24/2020 11:50 AM  
 Nitrites Negative 11/24/2020 11:50 AM  
 Leukocyte Esterase Negative 11/24/2020 11:50 AM  
 Bacteria Negative 11/24/2020 11:50 AM  
 WBC 0-4 11/24/2020 11:50 AM  
 RBC 0-5 11/24/2020 11:50 AM  
 
 
 
Medications Reviewed:  
 
Current Facility-Administered Medications Medication Dose Route Frequency  hydrALAZINE (APRESOLINE) 20 mg/mL injection 10 mg  10 mg IntraVENous Q6H PRN  
 hydrALAZINE (APRESOLINE) tablet 25 mg  25 mg Oral TID  hydrOXYzine HCL (ATARAX) tablet 10 mg  10 mg Oral TID PRN  piperacillin-tazobactam (ZOSYN) 3.375 g in 0.9% sodium chloride (MBP/ADV) 100 mL MBP  3.375 g IntraVENous Q8H  
 pantoprazole (PROTONIX) 40 mg in 0.9% sodium chloride 10 mL injection  40 mg IntraVENous DAILY  allopurinoL (ZYLOPRIM) tablet 100 mg  100 mg Oral BID  mycophenolate mofetil (CELLCEPT) capsule 250 mg  250 mg Oral ACB&D  
 zinc sulfate (ZINCATE) 220 (50) mg capsule 1 Cap  1 Cap Oral DAILY  ascorbic acid (vitamin C) (VITAMIN C) tablet 500 mg  500 mg Oral BID  cholecalciferol (VITAMIN D3) (1000 Units /25 mcg) tablet 2 Tab  2,000 Units Oral DAILY  albuterol (PROVENTIL HFA, VENTOLIN HFA, PROAIR HFA) inhaler 2 Puff  2 Puff Inhalation Q4H PRN  zinc oxide-cod liver oil (DESITIN) 40 % paste   Topical Q8H  
 acetaminophen (TYLENOL) tablet 650 mg  650 mg Oral Q6H PRN Or  
 acetaminophen (TYLENOL) suppository 650 mg  650 mg Rectal Q6H PRN  
 methylPREDNISolone (PF) (SOLU-MEDROL) injection 40 mg  40 mg IntraVENous Q12H  
 sodium chloride (NS) flush 5-40 mL  5-40 mL IntraVENous Q8H  
 sodium chloride (NS) flush 5-40 mL  5-40 mL IntraVENous PRN  polyethylene glycol (MIRALAX) packet 17 g  17 g Oral DAILY PRN  
 enoxaparin (LOVENOX) injection 30 mg  30 mg SubCUTAneous Q24H  
 0.9% sodium chloride infusion  50 mL/hr IntraVENous CONTINUOUS  
 ondansetron (ZOFRAN) injection 4 mg  4 mg IntraVENous Q6H PRN  
 amLODIPine (NORVASC) tablet 10 mg  10 mg Oral DAILY  aspirin delayed-release tablet 81 mg  81 mg Oral DAILY  melatonin tablet 3 mg  3 mg Oral QHS  metoprolol tartrate (LOPRESSOR) tablet 50 mg  50 mg Oral BID  tacrolimus (PROGRAF) capsule 1 mg  1 mg Oral Q12H  
 glucose chewable tablet 16 g  4 Tab Oral PRN  
 glucagon (GLUCAGEN) injection 1 mg  1 mg IntraMUSCular PRN  
 dextrose 10% infusion 0-250 mL  0-250 mL IntraVENous PRN  
 insulin lispro (HUMALOG) injection   SubCUTAneous Q6H  
 labetaloL (NORMODYNE;TRANDATE) injection 20 mg  20 mg IntraVENous Q4H PRN  
 
______________________________________________________________________ EXPECTED LENGTH OF STAY: 5d 12h ACTUAL LENGTH OF STAY:          3 Seth Saleh MD

## 2020-11-28 NOTE — PROGRESS NOTES
Bedside shift change report given to Felecia Soliman (oncoming nurse) by Kareem Li (offgoing nurse). Report included the following information SBAR, Kardex, MAR and Recent Results.

## 2020-11-28 NOTE — PROGRESS NOTES
Bedside shift change report given to Luciano Mehta (oncoming nurse) by Che Ballesteros (offgoing nurse). Report included the following information SBAR, Kardex and MAR.

## 2020-11-29 NOTE — PROGRESS NOTES
Occupational Therapy Note:  
 
Orders received, chart reviewed and patient is currently positive for COVID-19. In attempts to have only essential personnel enter the room and conserve PPE, we will confer with nursing and/or the referring provider to determine the most appropriate timing of our therapy intervention. Until this time, we will follow the patient peripherally to support nursing staff on an appropriate care plan. Thank you for your assistance.   
 
Deanna Longoria OT

## 2020-11-29 NOTE — ROUTINE PROCESS
Verbal shift change report given to [place] rn (oncoming nurse) by Roseanna Vega (offgoing nurse). Report included the following information SBAR and Kardex.

## 2020-11-29 NOTE — PROGRESS NOTES
Orders received, chart reviewed and patient is currently under investigation for COVID-19. In attempts to have only essential personnel enter the room and conserve PPE, we will confer with nursing and/or the referring provider to determine the most appropriate timing of our therapy intervention. Until this time, we will follow the patient peripherally to support nursing staff on an appropriate care plan. Thank you for your assistance.   
Eunice Nowak, PT

## 2020-11-29 NOTE — PROGRESS NOTES
visit per palliative consult. Pt has contact precautions at this time. Please contact [phone] [facility] for further support. [address] Michel Dent, [facility] 
 [phone]

## 2020-11-29 NOTE — PROGRESS NOTES
Problem: Falls - Risk of 
Goal: *Absence of Falls Description: Document Becca Luna Fall Risk and appropriate interventions in the flowsheet. Outcome: Progressing Towards Goal 
Note: Fall Risk Interventions: 
 
 Video monitoring initiated. Medication Interventions: Evaluate medications/consider consulting pharmacy Elimination Interventions: Call light in reach Problem: Pressure Injury - Risk of 
Goal: *Prevention of pressure injury Description: Document Bi Scale and appropriate interventions in the flowsheet. Outcome: Progressing Towards Goal 
Note: Pressure Injury Interventions: 
Sensory Interventions: Keep linens dry and wrinkle-free Moisture Interventions: Apply protective barrier, creams and emollients, Internal/External urinary devices Activity Interventions: Pressure redistribution bed/mattress(bed type) Mobility Interventions: HOB 30 degrees or less Nutrition Interventions: Offer support with meals,snacks and hydration Friction and Shear Interventions: Apply protective barrier, creams and emollients Problem: Breathing Pattern - Ineffective Goal: *Absence of hypoxia Outcome: Progressing Towards Goal 
Goal: *Use of effective breathing techniques Outcome: Progressing Towards Goal 
Goal: *PALLIATIVE CARE:  Alleviation of Dyspnea Outcome: Progressing Towards Goal 
  
Problem: Nutrition Deficit Goal: *Optimize nutritional status Outcome: Progressing Towards Goal 
  
Problem: Risk for Spread of Infection Goal: Prevent transmission of infectious organism to others Description: Prevent the transmission of infectious organisms to other patients, staff members, and visitors. Outcome: Progressing Towards Goal 
  
Problem: Pain Goal: *Control of Pain Outcome: Progressing Towards Goal

## 2020-11-29 NOTE — PROGRESS NOTES
[address] [facility] Hospitalist Progress Note Jesika Rangel MD 
Answering service: [phone] OR [phone] from in house phone Date of Service:  2020 NAME:  Ct Meeks :  1950 MRN:  [id number] Admission Summary:  
Per H and P \" 76-year-old female with multiple medical problem was recently diagnosed with diffuse large cell lymphoma late 2020 after tonsillar biopsy. She did not start treatment yet. For the last few days she noticed intermittent chills and fever\". Interval history / Subjective:  
 Patient seen and examined  She had some difficulty swallowing this morning. Had diarrhea yesterday. Assessment & Plan: # COVID 19 positive 
-CXR no obvious infiltrates 
-she is at baseline oxygen 2 lt. 
-not a candidate for remdesevir due to CKD # ESRD s/p renal transplant 
-on cellcept ,tacrolimus and steroids- can switch to solumedrol to prednisone. # PTLD-diffuse B nikki lymphoms 
-received rituximab recently # C diff colitis: 
-start oral vancomycin #Acute on chronic kidney disease: #Hyperkalemia 
-renal function stable. 
-changed to low potassium diet and supplements. Received valtessa Potassium better today #History of candida esophagitis: 
-she was prescribed 14 day course of  Fluconazole previously 
-nystatin oral swish and swallow #Chronic hypoxic resp failure, COPD 
-prn duonebs Chronic anemia: 
-hb stable. #HTN: 
-continue current regimen. Code status: full DVT prophylaxis: lovenox Care Plan discussed with:patient,nurse. Spoke to patient's daughters over phone  Anticipated Disposition: tbd Anticipated Discharge:tbd Hospital Problems  Date Reviewed: 2020 Codes Class Noted POA  Post-transplant lymphoproliferative disorder (HCC) ICD-10-CM: T86.99, D47.Z1 
ICD-9-CM: 996.80, 238.77  11/25/2020 Unknown Lymphoma ([facility] [address] ICD-10-CM: C85.90 ICD-9-CM: 202.80  11/24/2020 Unknown Review of Systems: As per HPI Vital Signs:  
 Last 24hrs VS reviewed since prior progress note. Most recent are: 
Visit Vitals BP (!) (P) 161/69 (BP 1 Location: Right arm, BP Patient Position: At rest) Pulse (P) 66 Temp (P) 97.4 °F (36.3 °C) Resp (P) 16 Ht 5' 3\" (1.6 m) Wt 72.7 kg (160 lb 4.4 oz) SpO2 (P) 98% BMI 28.39 kg/m² Intake/Output Summary (Last 24 hours) at 11/29/2020 1517 Last data filed at 11/29/2020 1732 Gross per 24 hour Intake  Output 1630 ml Net -1630 ml Physical Examination:  
 
I had a face to face encounter with this patient and independently examined them on 11/29/2020 as outlined below: 
 
     
Constitutional:  No acute distress Some oral thrush Resp:  CTA bilaterally. No wheezing/rhonchi/rales. No accessory muscle use CV:  Regular rhythm, normal rate, no murmurs GI:  Soft, non distended,non tender, ventral hernia +, Musculoskeletal:  No LE edema Neurologic:  Moves all extremities. Awake and alert,oriented X 3 Psych:  Not anxious nor agitated. Data Review:  
 Review and/or order of clinical lab test 
Review and/or order of tests in the medicine section of [facility] Labs:  
 
Recent Labs  
  11/29/20 
0850 11/28/20 0222 WBC 6.2 3.6 HGB 13.5 11.5 HCT 46.0 38.7  197 Recent Labs  
  11/29/20 
0850 11/28/20 0222 11/27/20 0410  138 143  
K 5.0 5.8* 5.2*  
 109* 110* CO2 25 22 26 BUN 52* 55* 54* CREA 1.78* 1.85* 1.99* * 151* 143* CA 9.4 9.7 9.0 PHOS 3.0  --  2.9 Recent Labs  
  11/29/20 
0850 11/28/20 0222 11/27/20 
0410 ALT  --  9*  --   
AP  --  95  --   
TBILI  --  0.4  --   
TP  --  6.8  --   
ALB 2.7* 2.5* 2.5*  
GLOB  --  4.3*  -- No results for input(s): INR, PTP, APTT, INREXT, INREXT in the last 72 hours.   
No results for input(s): FE, TIBC, PSAT, FERR in the last 72 hours. No results found for: FOL, RBCF No results for input(s): PH, PCO2, PO2 in the last 72 hours. No results for input(s): CPK, CKNDX, TROIQ in the last 72 hours. No lab exists for component: CPKMB No results found for: CHOL, CHOLX, CHLST, CHOLV, HDL, HDLP, LDL, LDLC, DLDLP, TGLX, TRIGL, TRIGP, CHHD, CHHDX Lab Results Component Value Date/Time Glucose (POC) 142 (H) 11/29/2020 11:49 AM  
 Glucose (POC) 177 (H) 11/29/2020 06:35 AM  
 Glucose (POC) 183 (H) 11/28/2020 09:34 PM  
 Glucose (POC) 299 (H) 11/28/2020 06:05 PM  
 Glucose (POC) 301 (H) 11/28/2020 12:19 PM  
 
Lab Results Component Value Date/Time Color Yellow/Straw 11/24/2020 11:50 AM  
 Appearance Clear 11/24/2020 11:50 AM  
 Specific gravity 1.009 11/24/2020 11:50 AM  
 pH (UA) 7.0 11/24/2020 11:50 AM  
 Protein 100 (A) 11/24/2020 11:50 AM  
 Glucose 50 (A) 11/24/2020 11:50 AM  
 Ketone Negative 11/24/2020 11:50 AM  
 Bilirubin Negative 11/24/2020 11:50 AM  
 Urobilinogen 0.1 11/24/2020 11:50 AM  
 Nitrites Negative 11/24/2020 11:50 AM  
 Leukocyte Esterase Negative 11/24/2020 11:50 AM  
 Bacteria Negative 11/24/2020 11:50 AM  
 WBC 0-4 11/24/2020 11:50 AM  
 RBC 0-5 11/24/2020 11:50 AM  
 
 
 
Medications Reviewed:  
 
Current Facility-Administered Medications Medication Dose Route Frequency  hydrALAZINE (APRESOLINE) tablet 50 mg  50 mg Oral TID  methylPREDNISolone (PF) (SOLU-MEDROL) injection 40 mg  40 mg IntraVENous DAILY  tacrolimus (PROGRAF) capsule 2 mg  2 mg Oral Q12H  
 glucose chewable tablet 16 g  4 Tab Oral PRN  
 glucagon (GLUCAGEN) injection 1 mg  1 mg IntraMUSCular PRN  
 dextrose 10% infusion 0-250 mL  0-250 mL IntraVENous PRN  
 insulin lispro (HUMALOG) injection   SubCUTAneous AC&HS  
 oxyCODONE IR (ROXICODONE) tablet 5 mg  5 mg Oral Q4H PRN  
 hydrOXYzine HCL (ATARAX) tablet 10 mg  10 mg Oral TID PRN  
 [Held by provider] piperacillin-tazobactam (ZOSYN) 3.375 g in 0.9% sodium chloride (MBP/ADV) 100 mL MBP  3.375 g IntraVENous Q8H  
 pantoprazole (PROTONIX) 40 mg in 0.9% sodium chloride 10 mL injection  40 mg IntraVENous DAILY  allopurinoL (ZYLOPRIM) tablet 100 mg  100 mg Oral BID  mycophenolate mofetil (CELLCEPT) capsule 250 mg  250 mg Oral ACB&D  
 zinc sulfate (ZINCATE) 220 (50) mg capsule 1 Cap  1 Cap Oral DAILY  ascorbic acid (vitamin C) (VITAMIN C) tablet 500 mg  500 mg Oral BID  cholecalciferol (VITAMIN D3) (1000 Units /25 mcg) tablet 2 Tab  2,000 Units Oral DAILY  albuterol (PROVENTIL HFA, VENTOLIN HFA, PROAIR HFA) inhaler 2 Puff  2 Puff Inhalation Q4H PRN  zinc oxide-cod liver oil (DESITIN) 40 % paste   Topical Q8H  
 acetaminophen (TYLENOL) tablet 650 mg  650 mg Oral Q6H PRN Or  
 acetaminophen (TYLENOL) suppository 650 mg  650 mg Rectal Q6H PRN  
 sodium chloride (NS) flush 5-40 mL  5-40 mL IntraVENous Q8H  
 sodium chloride (NS) flush 5-40 mL  5-40 mL IntraVENous PRN  polyethylene glycol (MIRALAX) packet 17 g  17 g Oral DAILY PRN  
 enoxaparin (LOVENOX) injection 30 mg  30 mg SubCUTAneous Q24H  
 0.9% sodium chloride infusion  50 mL/hr IntraVENous CONTINUOUS  
 ondansetron (ZOFRAN) injection 4 mg  4 mg IntraVENous Q6H PRN  
 amLODIPine (NORVASC) tablet 10 mg  10 mg Oral DAILY  aspirin delayed-release tablet 81 mg  81 mg Oral DAILY  melatonin tablet 3 mg  3 mg Oral QHS  metoprolol tartrate (LOPRESSOR) tablet 50 mg  50 mg Oral BID  labetaloL (NORMODYNE;TRANDATE) injection 20 mg  20 mg IntraVENous Q4H PRN  
 
______________________________________________________________________ EXPECTED LENGTH OF STAY: 5d 12h ACTUAL LENGTH OF STAY:          5 Elias Layton MD

## 2020-11-29 NOTE — PROGRESS NOTES
Hematology-Oncology Progress Note Valentin Hernandez 1950 
[id number] 
11/29/2020 Subjective:  
 
Congestion this AM making breakfast difficultt, made NPO again. Breathing comfortably. Allergies: Benadryl [diphenhydramine hcl]; Fentanyl; Morphine; and Versed [midazolam] Current Facility-Administered Medications Medication Dose Route Frequency Provider Last Rate Last Dose  hydrALAZINE (APRESOLINE) tablet 50 mg  50 mg Oral TID Faye Hansen MD   50 mg at 11/29/20 0922  
 methylPREDNISolone (PF) (SOLU-MEDROL) injection 40 mg  40 mg IntraVENous DAILY Faye Hansen MD   40 mg at 11/29/20 1934  tacrolimus (PROGRAF) capsule 2 mg  2 mg Oral Q12H Reginaldo Boyer MD   2 mg at 11/29/20 8714  
 glucose chewable tablet 16 g  4 Tab Oral PRN Faye Hansen MD      
 glucagon (GLUCAGEN) injection 1 mg  1 mg IntraMUSCular PRN Faye Hansen MD      
 dextrose 10% infusion 0-250 mL  0-250 mL IntraVENous PRN Faye Hansen MD      
 insulin lispro (HUMALOG) injection   SubCUTAneous AC&HS Faye Hansen MD   Stopped at 11/29/20 1130  
 oxyCODONE IR (ROXICODONE) tablet 5 mg  5 mg Oral Q4H PRN Venessa Wright NP   5 mg at 11/29/20 0210  hydrOXYzine HCL (ATARAX) tablet 10 mg  10 mg Oral TID PRN Luis Armando Kelley NP   10 mg at 11/26/20 1014  [Held by provider] piperacillin-tazobactam (ZOSYN) 3.375 g in 0.9% sodium chloride (MBP/ADV) 100 mL MBP  3.375 g IntraVENous Q8H Luis Armando Kelley NP 25 mL/hr at 11/28/20 0157 3.375 g at 11/28/20 0157  pantoprazole (PROTONIX) 40 mg in 0.9% sodium chloride 10 mL injection  40 mg IntraVENous DAILY Leesa Martin NP   40 mg at 11/29/20 9722  allopurinoL (ZYLOPRIM) tablet 100 mg  100 mg Oral BID North Bradley MD   100 mg at 11/29/20 6670  mycophenolate mofetil (CELLCEPT) capsule 250 mg  250 mg Oral ACB&D Josep Watts MD   250 mg at 11/29/20 7799  zinc sulfate (ZINCATE) 220 (50) mg capsule 1 Cap  1 Cap Oral DAILY Magnolia Neha Lundy, RIAN   1 Cap at 11/29/20 1912  
 ascorbic acid (vitamin C) (VITAMIN C) tablet 500 mg  500 mg Oral BID Nikolas Edwards NP   500 mg at 11/29/20 3854  cholecalciferol (VITAMIN D3) (1000 Units /25 mcg) tablet 2 Tab  2,000 Units Oral DAILY Nikolas Edwards NP   2 Tab at 11/29/20 4299  albuterol (PROVENTIL HFA, VENTOLIN HFA, PROAIR HFA) inhaler 2 Puff  2 Puff Inhalation Q4H PRN Nikolas Edwards NP   2 Puff at 11/29/20 1238  zinc oxide-cod liver oil (DESITIN) 40 % paste   Topical Q8H Jf Romero DO      
 acetaminophen (TYLENOL) tablet 650 mg  650 mg Oral Q6H PRN Charan Raygoza MD   650 mg at 11/29/20 3627 Or  acetaminophen (TYLENOL) suppository 650 mg  650 mg Rectal Q6H PRN Charan Raygoza MD      
 sodium chloride (NS) flush 5-40 mL  5-40 mL IntraVENous Q8H Charan Raygoza MD   10 mL at 11/29/20 0747  
 sodium chloride (NS) flush 5-40 mL  5-40 mL IntraVENous PRN Charan Raygoza MD      
 polyethylene glycol (MIRALAX) packet 17 g  17 g Oral DAILY PRN Charan Raygoza MD      
 enoxaparin (LOVENOX) injection 30 mg  30 mg SubCUTAneous Q24H Charan Raygoza MD   30 mg at 11/28/20 1819  
 0.9% sodium chloride infusion  50 mL/hr IntraVENous CONTINUOUS Reginaldo Boyer MD 50 mL/hr at 11/29/20 0913 50 mL/hr at 11/29/20 0913  ondansetron (ZOFRAN) injection 4 mg  4 mg IntraVENous Q6H PRN Charan Raygoza MD      
 amLODIPine (NORVASC) tablet 10 mg  10 mg Oral DAILY Charan Raygoza MD   10 mg at 11/29/20 5875  aspirin delayed-release tablet 81 mg  81 mg Oral DAILY Charan Raygoza MD   81 mg at 11/29/20 0922  
 melatonin tablet 3 mg  3 mg Oral QHS Charan Raygoza MD   3 mg at 11/28/20 8954  
 metoprolol tartrate (LOPRESSOR) tablet 50 mg  50 mg Oral BID Charan Freitas MD   Stopped at 11/29/20 0900  
 labetaloL (NORMODYNE;TRANDATE) injection 20 mg  20 mg IntraVENous Q4H PRN Charan Raygoza MD   20 mg at 11/25/20 1157 Objective:  
 
Patient Vitals for the past 24 hrs: 
 BP Temp Pulse Resp SpO2 Weight 11/29/20 1235      72.7 kg (160 lb 4.4 oz)  
11/29/20 1205  (!) 96 °F (35.6 °C)      
11/29/20 0947  (!) 94.9 °F (34.9 °C)      
11/29/20 0914 (!) 165/63  (!) 58 18 96 %   
11/29/20 0250 (!) 186/77 97.3 °F (36.3 °C) (!) 59 15 92 %   
11/28/20 1934 (!) 170/69 (!) 96.7 °F (35.9 °C) (!) 56 20 98 %   
11/28/20 1408 (!) 155/68 (!) 96.7 °F (35.9 °C) 60 20 98 %  Gen: NAD HEENT: PERRL, Sclerae anicteric Cv: RRR without m/r/g Pulm: CTA bilaterally Abd: NABS, NTND, No HSM Ext: No c/c/e Available labs reviewed: 
Labs:   
Recent Results (from the past 24 hour(s)) GLUCOSE, POC Collection Time: 11/28/20  6:05 PM  
Result Value Ref Range Glucose (POC) 299 (H) 65 - 100 mg/dL Performed by Maris Mancini GLUCOSE, POC Collection Time: 11/28/20  9:34 PM  
Result Value Ref Range Glucose (POC) 183 (H) 65 - 100 mg/dL Performed by Jacquie Gilliam GLUCOSE, POC Collection Time: 11/29/20  6:35 AM  
Result Value Ref Range Glucose (POC) 177 (H) 65 - 100 mg/dL Performed by Jacquie Gilliam D DIMER Collection Time: 11/29/20  8:50 AM  
Result Value Ref Range D-dimer 1.23 (H) 0.00 - 0.65 mg/L FEU  
CBC WITH AUTOMATED DIFF Collection Time: 11/29/20  8:50 AM  
Result Value Ref Range WBC 6.2 3.6 - 11.0 K/uL  
 RBC 4.97 3.80 - 5.20 M/uL  
 HGB 13.5 11.5 - 16.0 g/dL HCT 46.0 35.0 - 47.0 % MCV 92.6 80.0 - 99.0 FL  
 MCH 27.2 26.0 - 34.0 PG  
 MCHC 29.3 (L) 30.0 - 36.5 g/dL  
 RDW 16.8 (H) 11.5 - 14.5 % PLATELET 124 235 - 700 K/uL MPV 9.9 8.9 - 12.9 FL  
 NRBC 0.0 0  WBC ABSOLUTE NRBC 0.00 0.00 - 0.01 K/uL NEUTROPHILS 93 (H) 32 - 75 % LYMPHOCYTES 3 (L) 12 - 49 % MONOCYTES 4 (L) 5 - 13 % EOSINOPHILS 0 0 - 7 % BASOPHILS 0 0 - 1 % IMMATURE GRANULOCYTES 0 0.0 - 0.5 % ABS. NEUTROPHILS 5.8 1.8 - 8.0 K/UL  
 ABS. LYMPHOCYTES 0.2 (L) 0.8 - 3.5 K/UL  
 ABS. MONOCYTES 0.2 0.0 - 1.0 K/UL ABS. EOSINOPHILS 0.0 0.0 - 0.4 K/UL  
 ABS. BASOPHILS 0.0 0.0 - 0.1 K/UL  
 ABS. IMM. GRANS. 0.0 0.00 - 0.04 K/UL  
 DF SMEAR SCANNED    
 RBC COMMENTS ANISOCYTOSIS 1+ RENAL FUNCTION PANEL Collection Time: 11/29/20  8:50 AM  
Result Value Ref Range Sodium 138 136 - 145 mmol/L Potassium 5.0 3.5 - 5.1 mmol/L Chloride 108 97 - 108 mmol/L  
 CO2 25 21 - 32 mmol/L Anion gap 5 5 - 15 mmol/L Glucose 209 (H) 65 - 100 mg/dL BUN 52 (H) 6 - 20 MG/DL Creatinine 1.78 (H) 0.55 - 1.02 MG/DL  
 BUN/Creatinine ratio 29 (H) 12 - 20 GFR est AA 34 (L) >60 ml/min/1.73m2 GFR est non-AA 28 (L) >60 ml/min/1.73m2 Calcium 9.4 8.5 - 10.1 MG/DL Phosphorus 3.0 2.6 - 4.7 MG/DL Albumin 2.7 (L) 3.5 - 5.0 g/dL GLUCOSE, POC Collection Time: 11/29/20 11:49 AM  
Result Value Ref Range Glucose (POC) 142 (H) 65 - 100 mg/dL Performed by Aura Shaw Assessment and Plan  
 
70 y/o woman with renal transplant on immunosuppression, now with PTLD. Developed dyspnea while having a port placed, admitted for evaluation. Found to have [place]. Given rituximab last week given cytotoxic chemotherapy relatively contraindicated with active COVID infection. 1. PTLD: please note that rituximab is a monoclonal antibody and persists in the circulation for weeks. It is still exerting an effect. Next dose in ~18 days. 2. ESRD s/p kidney transplant 3. COVID infection Thank you for allowing us to participate in the care of this very pleasant patient. Dianna Barriga MD 
Hematology/Oncology Phone [phone]

## 2020-11-29 NOTE — PROGRESS NOTES
Bedside shift change report given to Estuardo Fontanez (oncoming nurse) by Kai Cardoza (offgoing nurse). Report included the following information SBAR, Kardex, MAR and Recent Results.

## 2020-11-30 NOTE — PROGRESS NOTES
Problem: Dysphagia (Adult) Goal: *Acute Goals and Plan of Care (Insert Text) Description: Speech Therapy Goals Initiated 11/25/2020 1. Patient will tolerate pureed diet/thin liquids without adverse effects within 7 days. 2. Patient will reinitiate baseline diet without adverse effects within 7 days. Outcome: Progressing Towards Goal 
 
SPEECH LANGUAGE PATHOLOGY DYSPHAGIA TREATMENT Patient: Clare Lindsey (43 y.o. female) Date: 11/30/2020 Diagnosis: Lymphoma ([facility] [address] [place]in[place]e Solian <principal problem not specified> Precautions: swallow (droplet, COVID+) ASSESSMENT: 
Patient presents with continued intact oropharyngeal swallow however, aspiration risk elevated given overall fatigue. Patient with complaint of \"burning\" sensation with swallowing presumably due to candidiasis resulting in patient not wanting to eat or drink much of anything. Overt grimacing and oral holding evident with sips of liquid. PLAN: 
Recommendations and Planned Interventions: 
Purees/thin liquids Safe swallowing strategies (upright for all PO, small bites/sips, slow rate) Patient continues to benefit from skilled intervention to address the above impairments. Continue treatment per established plan of care. Discharge Recommendations: To Be Determined SUBJECTIVE:  
Patient made NPO over weekend due to increasing swallowing difficulty. Little PO being consumed given odynophagia per patient/RN. OBJECTIVE:  
Cognitive and Communication Status: 
Neurologic State: Alert Orientation Level: Oriented to person, Oriented to place, Oriented to situation, Disoriented to time Cognition: Follows commands Perception: Appears intact Perseveration: No perseveration noted Safety/Judgement: Not assessed Dysphagia Treatment: 
Oral Assessment: 
Oral Assessment Labial: No impairment Dentition: Natural 
Oral Hygiene: moist 
Lingual: No impairment Velum: Unable to visualize Mandible: No impairment P.O.  Trials: 
Patient Position: Upright in bed Vocal quality prior to P.O.: Fatigue;Breathy Consistency Presented: Thin liquid How Presented: Straw;Cup/sip; Self-fed/presented;SLP-fed/presented Bolus Acceptance: No impairment Bolus Formation/Control: No impairment Propulsion: Delayed (# of seconds)(appeared related to anticipation of burning/painful swallow versus \"true\" delay) Oral Residue: None Initiation of Swallow: No impairment Laryngeal Elevation: Functional 
Aspiration Signs/Symptoms: None Pharyngeal Phase Characteristics: Poor endurance;Easily fatigued ; Feeling of discomfort Effective Modifications: Small sips and bites Cues for Modifications: None Oral Phase Severity: No impairment Pharyngeal Phase Severity : No impairment Pain: 
Pain Scale 1: Numeric (0 - 10) Pain Intensity 1: 0 After treatment:  
Patient left in no apparent distress sitting up in chair, Call bell within reach, and Nursing notified COMMUNICATION/EDUCATION:  
 
The patient's plan of care including recommendations, planned interventions, and recommended diet changes were discussed with: Registered nurse. Viola Figueroa MS, CCC-SLP, BCS-S Time Calculation: 11 mins

## 2020-11-30 NOTE — PROGRESS NOTES
Primary Nurse Kiki Cadena and Jules Strauss RN performed a dual skin assessment on this patient Impairment noted- see wound doc flow sheet Bi score is 14. Multiple small wounds noted to buttocks. Many scars and excoriation to buttocks and sacrum. Scars on upper back. Bedside and Verbal shift change report given to SPARKLE Royal (oncoming nurse) by Kye Arreola (offgoing nurse). Report included the following information SBAR, Kardex, Intake/Output, MAR, Accordion, Recent Results, Cardiac Rhythm NSR and Quality Measures. Problem: Falls - Risk of 
Goal: *Absence of Falls Description: Document Jesús Steen Fall Risk and appropriate interventions in the flowsheet. Outcome: Progressing Towards Goal 
Note: Fall Risk Interventions: 
  
 
Mentation Interventions: Adequate sleep, hydration, pain control, More frequent rounding, Reorient patient, Room close to nurse's station, Toileting rounds, Update white board Medication Interventions: Evaluate medications/consider consulting pharmacy, Patient to call before getting OOB, Teach patient to arise slowly Elimination Interventions: Call light in reach, Patient to call for help with toileting needs, Stay With Me (per policy), Toileting schedule/hourly rounds Problem: Pressure Injury - Risk of 
Goal: *Prevention of pressure injury Description: Document Bi Scale and appropriate interventions in the flowsheet. Outcome: Progressing Towards Goal 
Note: Pressure Injury Interventions: 
Sensory Interventions: Assess changes in LOC, Float heels, Keep linens dry and wrinkle-free, Minimize linen layers, Pressure redistribution bed/mattress (bed type), Turn and reposition approx. every two hours (pillows and wedges if needed) Moisture Interventions: Absorbent underpads, Apply protective barrier, creams and emollients, Check for incontinence Q2 hours and as needed, Internal/External urinary devices, Maintain skin hydration (lotion/cream), Minimize layers, Moisture barrier Activity Interventions: Increase time out of bed, Pressure redistribution bed/mattress(bed type), PT/OT evaluation Mobility Interventions: Float heels, Pressure redistribution bed/mattress (bed type), PT/OT evaluation, Turn and reposition approx. every two hours(pillow and wedges) Nutrition Interventions: Document food/fluid/supplement intake Friction and Shear Interventions: Apply protective barrier, creams and emollients, Minimize layers Problem: Airway Clearance - Ineffective Goal: *Absence of airway secretions Outcome: Progressing Towards Goal 
Note: Merryl Rhody available in room Problem: Breathing Pattern - Ineffective Goal: *Absence of hypoxia Outcome: Progressing Towards Goal 
Note: Patient on 4L NC with sats above 92% Problem: Patient Education: Go to Patient Education Activity Goal: Patient/Family Education Outcome: Progressing Towards Goal 
Note: Speech consulted Problem: Risk for Spread of Infection Goal: Prevent transmission of infectious organism to others Description: Prevent the transmission of infectious organisms to other patients, staff members, and visitors. Outcome: Progressing Towards Goal 
Note: Droplet plus and airborne, enteric Problem: Diabetes Self-Management Goal: *Monitoring blood glucose, interpreting and using results Description: Identify recommended blood glucose targets  and personal targets. Outcome: Progressing Towards Goal 
Note: ACHS checks

## 2020-11-30 NOTE — ROUTINE PROCESS
Transition Plan of Care RUR 41%-High 
Covid positive along with recent diagnosis of Lymphoma. Received Rituxan on 11/26. Also ERSD with kidney transplant. Not a candidate for remdisiver given renal disease. At baseline she lives on first floor of home with her . She wears oxygen at 2 lpm. Will need to assess for increased oxygen need at discharge as well as for level of care needed to discharge. Pending progress. Anjali Rosa RN CRM Ext [phone]

## 2020-11-30 NOTE — PROGRESS NOTES
Problem: Mobility Impaired (Adult and Pediatric) Goal: *Acute Goals and Plan of Care (Insert Text) Description: FUNCTIONAL STATUS PRIOR TO ADMISSION: Patient was modified independent using a rolling walker vs SPC for functional mobility. HOME SUPPORT PRIOR TO ADMISSION: The patient lived with her spouse and had additional home care staff assistance. Physical Therapy Goals Initiated 11/30/2020 1. Patient will move from supine to sit and sit to supine  and scoot up and down in bed with supervision/set-up within 7 day(s). 2.  Patient will transfer from bed to chair and chair to bed with supervision/set-up using the least restrictive device within 7 day(s). 3.  Patient will perform sit to stand with supervision/set-up within 7 day(s). 4.  Patient will ambulate with supervision/set-up for 100 feet with the least restrictive device within 7 day(s). Outcome: Progressing Towards Goal 
  
PHYSICAL THERAPY EVALUATION Patient: Rakesh Mcdaniel (33 y.o. female) Date: 11/30/2020 Primary Diagnosis: Lymphoma ([address] [facility] Precautions:   (droplet, COVID+) ASSESSMENT Based on the objective data described below, the patient presents with impaired functional mobility as compared to baseline level 2* generalized weakness and decreased cardiopulmonary tolerance to activity leading to impaired gait/balance following admission for COVID-19 infection. At baseline, she reports that she lived at home with her spouse and hired caregivers. 2L O2 at baseline. Received pt on 4L O2, SpO2 stable with mobility (brief drop to low 90s). She was able to complete bed mobility with SBA and demo good sitting balance once up. Required min A x2 for sit<>stands from EOB - demos posterior lean on initial stance that improved with marching to reset G[place]O. Able to ambulate short distance to chair with HHA x2.  Hopeful for good progress in acute setting - recommending home with family/caregiver assist, HHPT, and use of RW pending progress. If not feasible, then likely short rehab stay. Current Level of Function Impacting Discharge (mobility/balance): min A x2 Functional Outcome Measure: The patient scored 6/28 on the Tinetti outcome measure which is indicative of high falls risk. Other factors to consider for discharge: has caregiver support; 2L O2 at baseline Patient will benefit from skilled therapy intervention to address the above noted impairments. PLAN : 
Recommendations and Planned Interventions: bed mobility training, transfer training, gait training, therapeutic exercises, neuromuscular re-education, patient and family training/education, and therapeutic activities Frequency/Duration: Patient will be followed by physical therapy:  3 times a week to address goals. Recommendation for discharge: (in order for the patient to meet his/her long term goals) Physical therapy at least 2 days/week in the home AND ensure assist and/or supervision for safety with ADLs/mobility pending progress. If not feasible, then likely short rehab stay. This discharge recommendation: A follow-up discussion with the attending provider and/or case management is planned IF patient discharges home will need the following DME: patient owns DME required for discharge SUBJECTIVE:  
Patient stated I'm ok.  OBJECTIVE DATA SUMMARY:  
HISTORY:   
Past Medical History:  
Diagnosis Date Chronic kidney disease Diabetes ([facility] [address] Heart failure ([facility] [address] Hypertension Kidney disease Sleep apnea Throat cancer ([facility] [address] Past Surgical History:  
Procedure Laterality Date HX CHOLECYSTECTOMY HX RENAL TRANSPLANT Personal factors and/or comorbidities impacting plan of care: PMHx Home Situation Home Environment: Apartment One/Two Story Residence: One story Living Alone: No 
Support Systems: Spouse/Significant Other/Partner, Home care staff Current DME Used/Available at Home: Cane, straight, Walker, rolling EXAMINATION/PRESENTATION/DECISION MAKING:  
Critical Behavior: 
Neurologic State: Alert Orientation Level: Oriented to person, Oriented to place, Oriented to situation, Disoriented to time Cognition: Follows commands Safety/Judgement: Awareness of environment, Insight into deficits Range Of Motion: 
AROM: Generally decreased, functional 
 
  
Strength:   
Strength: Generally decreased, functional 
 
  
Tone & Sensation:  
Tone: Normal 
Sensation: Intact Coordination: 
Coordination: Generally decreased, functional 
Vision:  
Acuity: Within Defined Limits Functional Mobility: 
Bed Mobility: 
 Supine to Sit: Stand-by assistance Transfers: 
Sit to Stand: Assist x2;Minimum assistance Stand to Sit: Assist x2;Minimum assistance Bed to Chair: Assist x2;Minimum assistance Balance:  
Sitting: Intact Standing: Impaired Standing - Static: Fair;Constant support Standing - Dynamic : Fair;Constant support Ambulation/Gait Training: 
Distance (ft): 5 Feet (ft) Assistive Device: Gait belt(B HHA) Ambulation - Level of Assistance: Assist x2;Minimal assistance Gait Description (WDL): Exceptions to [facility] Gait Abnormalities: Decreased step clearance;Shuffling gait;Trunk sway increased Base of Support: Narrowed; Center of gravity altered Speed/Zina: Slow;Shuffled Step Length: Right shortened;Left shortened Functional Measure: 
Tinetti test: 
 
Sitting Balance: 1 Arises: 0 Attempts to Rise: 0 Immediate Standing Balance: 0 Standing Balance: 0 Nudged: 0 Eyes Closed: 0 Turn 360 Degrees - Continuous/Discontinuous: 0 Turn 360 Degrees - Steady/Unsteady: 0 Sitting Down: 0 Balance Score: 1 Balance total score Indication of Gait: 0 
R Step Length/Height: 0 
L Step Length/Height: 0 
R Foot Clearance: 1 L Foot Clearance: 1 Step Symmetry: 1 Step Continuity: 0 Path: 1 Trunk: 0 Walking Time: 1 Gait Score: 5 Gait total score Total Score: 6/28 Overall total score Tinetti Tool Score Risk of Falls 
<19 = High Fall Risk 19-24 = Moderate Fall Risk 25-28 = Low Fall Risk Tinetti ME. Performance-Oriented Assessment of Mobility Problems in Elderly Patients. [facility]; [id number]. (Scoring Description: PT Bulletin Feb. 10, 1993) Older adults: Israel Rodriguez et al, 2009; n = [address] elderly evaluated with ABC, HOWIE, ADL, and IADL) · Mean HOWIE score for males aged 69-68 years = 26.21(3.40) · Mean HOWIE score for females age 69-68 years = 25.16(4.30) · Mean HOWIE score for males over 80 years = 23.29(6.02) · Mean HOWIE score for females over 80 years = 17.20(8.32) Physical Therapy Evaluation Charge Determination History Examination Presentation Decision-Making HIGH Complexity :3+ comorbidities / personal factors will impact the outcome/ POC  MEDIUM Complexity : 3 Standardized tests and measures addressing body structure, function, activity limitation and / or participation in recreation  LOW Complexity : Stable, uncomplicated  HIGH Complexity : FOTO score of 1- 25 Based on the above components, the patient evaluation is determined to be of the following complexity level: LOW Activity Tolerance:  
Good, Fair, desaturates with exertion and requires oxygen, requires frequent rest breaks, and observed SOB with activity After treatment patient left in no apparent distress:  
Sitting in chair, Call bell within reach, and Bed / chair alarm activated COMMUNICATION/EDUCATION:  
The patients plan of care was discussed with: Occupational therapist and Registered nurse. Fall prevention education was provided and the patient/caregiver indicated understanding., Patient/family have participated as able in goal setting and plan of care. , and Patient/family agree to work toward stated goals and plan of care. Thank you for this referral. 
Surendra Tobar, PT, DPT Time Calculation: 29 mins

## 2020-11-30 NOTE — PROGRESS NOTES
Nephrology Progress Note Shankar Manner Date of Admission : 11/24/2020 CC: Follow up for  Renal Tx Assessment and Plan Renal Transplant: 
- cont current IS meds - MMF, prograf, IV solumedrol - prograf level pending today - titrate per levals 
- daily labs Hyperkalemia: 
-low K diet  
-avoid Veltassa or Lokelma as could interfere with immuno's 
-Albuterol jet neb today 
  
CKD IV: 
- baseline Cr appears to be around 2 COVID-19 
  
Diffuse large B cell lymphoma Leucopenia  
- s/p Rituximab 11/26 HTN: 
- stable  
  
DM2: 
- on insulin Interval History: 
A+o, creat stable to imporved; K+ remains high Current Medications: all current  Medications have been eviewed in [facility] Review of Systems: Pertinent items are noted in HPI. Objective: 
Vitals:   
Vitals:  
 11/29/20 2353 11/30/20 0400 11/30/20 0700 11/30/20 1003 BP: (!) 194/64 (!) 181/65 (!) 187/73 (!) 181/75 Pulse: 64 70 71 72 Resp: 19 17 16 Temp: 97 °F (36.1 °C) 97.7 °F (36.5 °C) 98 °F (36.7 °C) SpO2: 99% 96% 96% Weight:  73.5 kg (162 lb 0.6 oz) Height:      
 
Intake and Output: 
No intake/output data recorded. 11/28 1901 - 11/30 0700 In: -  
Out: 2405 [[id number]:[id number]] Physical Examination:  
 
General: NAD HEENT : NAD 
NECK : SUPPLE Resp:  Stable oxygenation CV:  RRR on monitor, no edema Neurologic:  Non focal 
:  No knox []    High complexity decision making was performed 
[]    Patient is at high-risk of decompensation with multiple organ involvement Lab Data Personally Reviewed: I have reviewed all the pertinent labs, microbiology data and radiology studies during assessment. Recent Labs 11/30/20 
0350 11/29/20 
0850 11/28/20 
0222  138 138  
K 5.7* 5.0 5.8*  
* 108 109* CO2 22 25 22 * 209* 151* BUN 56* 52* 55* CREA 1.69* 1.78* 1.85* CA 9.4 9.4 9.7 PHOS 3.2 3.0  --   
ALB 2.5* 2.7* 2.5* ALT 19  --  9* Recent Labs   11/30/20 
0350 11/29/20 6126 11/28/20 
0222 WBC 8.6 6.2 3.6 HGB 10.8* 13.5 11.5 HCT 36.2 46.0 38.7  192 197 No results found for: SDES Lab Results Component Value Date/Time Culture result: MRSA NOT PRESENT 11/25/2020 12:11 PM  
 Culture result:  11/25/2020 12:11 PM  
      Screening of patient nares for MRSA is for surveillance purposes and, if positive, to facilitate isolation considerations in high risk settings. It is not intended for automatic decolonization interventions per se as regimens are not sufficiently effective to warrant routine use. Culture result: No growth 6 days 11/24/2020 10:24 AM  
 
Recent Results (from the past 24 hour(s)) GLUCOSE, POC Collection Time: 11/29/20 11:49 AM  
Result Value Ref Range Glucose (POC) 142 (H) 65 - 100 mg/dL Performed by Beverley Becerra GLUCOSE, POC Collection Time: 11/29/20  4:24 PM  
Result Value Ref Range Glucose (POC) 299 (H) 65 - 100 mg/dL Performed by Beverley MarinBioxodes GLUCOSE, POC Collection Time: 11/29/20  4:27 PM  
Result Value Ref Range Glucose (POC) 255 (H) 65 - 100 mg/dL Performed by Tigerton Blower POC EG7 Collection Time: 11/29/20  5:40 PM  
Result Value Ref Range Calcium, ionized (POC) 1.33 (H) 1.12 - 1.32 mmol/L  
 pH (POC) 7.27 (L) 7.35 - 7.45    
 pCO2 (POC) 49.0 (H) 35.0 - 45.0 MMHG  
 pO2 (POC) 82 80 - 100 MMHG  
 HCO3 (POC) 22.3 22 - 26 MMOL/L Base deficit (POC) 5 mmol/L  
 sO2 (POC) 94 92 - 97 % Site LEFT RADIAL Device: NASAL CANNULA Flow rate (POC) 3.5 L/M Allens test (POC) YES Specimen type (POC) ARTERIAL    
LACTIC ACID Collection Time: 11/29/20  6:07 PM  
Result Value Ref Range Lactic acid 1.0 0.4 - 2.0 MMOL/L  
GLUCOSE, POC Collection Time: 11/29/20  9:05 PM  
Result Value Ref Range Glucose (POC) 244 (H) 65 - 100 mg/dL Performed by Larnell Ethan POC EG7 Collection Time: 11/29/20 10:08 PM  
Result Value Ref Range  Calcium, ionized (POC) 1.40 (H) 1.12 - 1.32 mmol/L  
 pH (POC) 7.31 (L) 7.35 - 7.45    
 pCO2 (POC) 43.9 35.0 - 45.0 MMHG  
 pO2 (POC) 69 (L) 80 - 100 MMHG  
 HCO3 (POC) 22.2 22 - 26 MMOL/L Base deficit (POC) 4 mmol/L  
 sO2 (POC) 92 92 - 97 % Site RIGHT BRACHIAL Device: NASAL CANNULA Flow rate (POC) 4 L/M Allens test (POC) N/A Specimen type (POC) ARTERIAL    
PROCALCITONIN Collection Time: 11/30/20  3:50 AM  
Result Value Ref Range Procalcitonin 0.50 ng/mL D DIMER Collection Time: 11/30/20  3:50 AM  
Result Value Ref Range D-dimer 0.89 (H) 0.00 - 0.65 mg/L FEU  
CBC WITH AUTOMATED DIFF Collection Time: 11/30/20  3:50 AM  
Result Value Ref Range WBC 8.6 3.6 - 11.0 K/uL  
 RBC 3.95 3.80 - 5.20 M/uL  
 HGB 10.8 (L) 11.5 - 16.0 g/dL HCT 36.2 35.0 - 47.0 % MCV 91.6 80.0 - 99.0 FL  
 MCH 27.3 26.0 - 34.0 PG  
 MCHC 29.8 (L) 30.0 - 36.5 g/dL  
 RDW 16.6 (H) 11.5 - 14.5 % PLATELET 645 011 - 547 K/uL MPV 10.4 8.9 - 12.9 FL  
 NRBC 0.0 0  WBC ABSOLUTE NRBC 0.00 0.00 - 0.01 K/uL NEUTROPHILS 96 (H) 32 - 75 % LYMPHOCYTES 1 (L) 12 - 49 % MONOCYTES 2 (L) 5 - 13 % EOSINOPHILS 0 0 - 7 % BASOPHILS 0 0 - 1 % IMMATURE GRANULOCYTES 1 (H) 0.0 - 0.5 % ABS. NEUTROPHILS 8.2 (H) 1.8 - 8.0 K/UL  
 ABS. LYMPHOCYTES 0.1 (L) 0.8 - 3.5 K/UL  
 ABS. MONOCYTES 0.2 0.0 - 1.0 K/UL  
 ABS. EOSINOPHILS 0.0 0.0 - 0.4 K/UL  
 ABS. BASOPHILS 0.0 0.0 - 0.1 K/UL  
 ABS. IMM. GRANS. 0.1 (H) 0.00 - 0.04 K/UL  
 DF SMEAR SCANNED    
 RBC COMMENTS ANISOCYTOSIS 2+ 
    
 RBC COMMENTS NATE CELLS 
PRESENT 
    
METABOLIC PANEL, COMPREHENSIVE Collection Time: 11/30/20  3:50 AM  
Result Value Ref Range Sodium 139 136 - 145 mmol/L Potassium 5.7 (H) 3.5 - 5.1 mmol/L Chloride 109 (H) 97 - 108 mmol/L  
 CO2 22 21 - 32 mmol/L Anion gap 8 5 - 15 mmol/L Glucose 240 (H) 65 - 100 mg/dL BUN 56 (H) 6 - 20 MG/DL Creatinine 1.69 (H) 0.55 - 1.02 MG/DL  
 BUN/Creatinine ratio 33 (H) 12 - 20  GFR est AA 36 (L) >60 ml/min/1.73m2 GFR est non-AA 30 (L) >60 ml/min/1.73m2 Calcium 9.4 8.5 - 10.1 MG/DL Bilirubin, total 0.4 0.2 - 1.0 MG/DL  
 ALT (SGPT) 19 12 - 78 U/L  
 AST (SGOT) 31 15 - 37 U/L Alk. phosphatase 133 (H) 45 - 117 U/L Protein, total 6.6 6.4 - 8.2 g/dL Albumin 2.5 (L) 3.5 - 5.0 g/dL Globulin 4.1 (H) 2.0 - 4.0 g/dL A-G Ratio 0.6 (L) 1.1 - 2.2 PHOSPHORUS Collection Time: 11/30/20  3:50 AM  
Result Value Ref Range Phosphorus 3.2 2.6 - 4.7 MG/DL  
GLUCOSE, POC Collection Time: 11/30/20  8:33 AM  
Result Value Ref Range Glucose (POC) 231 (H) 65 - 100 mg/dL Performed by MD Gloria Richards [facility] [address], [address] [facility] Phone - [phone] Fax - [phone] 
www. [facility]

## 2020-11-30 NOTE — PROGRESS NOTES
[address] [facility] Adult  Hospitalist Group Hospitalist Progress Note Kaitlyn Sanz MD 
Answering service: [phone] OR [phone] from in house phone Date of Service:  2020 NAME:  Anca Pinto :  1950 MRN:  [id number] Admission Summary:  
Per H and P \" 44-year-old female with multiple medical problem was recently diagnosed with diffuse large cell lymphoma late 2020 after tonsillar biopsy. She did not start treatment yet. For the last few days she noticed intermittent chills and fever\". Interval history / Subjective:  
 Patient seen and examined  She is not a great historian. She was sitting in the chair, denied pain today. Diarrhea decreasing now. Assessment & Plan: # COVID 19 positive #Acute on chronic hypoxic resp failure, COPD Bacterial pneumonia 
-prn duonebs 
-not a candidate for remdesevir due to CKD 
-CXR  showed worsening airspace disease -procal is elevated -started cefepime and flagyl yesterday, add linezolid. 
-discussed about convalescent plasma with patient's daughters- they would like to discuss prior to consenting #Esophageal candidiasis: 
-per pharmacy patient did not  prescription for fluconazole 
-started eraxis as fluconozaole interacts with prograf # ESRD s/p renal transplant 
-on cellcept ,tacrolimus and steroids # PTLD-diffuse B nikki lymphoms 
-received rituximab this admission # C diff colitis: 
-on oral vancomycin and IV flagyl #Acute on chronic kidney disease: #Hyperkalemia 
-renal function stable. 
-changed to low potassium diet and supplements. Received valtessa Potassium elevated -ordered insulin/glucose Chronic anemia: 
-hb stable. #HTN: 
-BP high ,add clonidine. Code status: full DVT prophylaxis: lovenox Care Plan discussed with:patient,nurse.  Spoke to patient's daughters over phone -updated clinical course so far Anticipated Disposition: tbd Anticipated Discharge:tbd Hospital Problems  Date Reviewed: 11/24/2020 Codes Class Noted POA Post-transplant lymphoproliferative disorder [facility]) ICD-10-CM: T86.99, D47.Z1 
ICD-9-CM: 996.80, 238.77  11/25/2020 Unknown Lymphoma ([facility] [address] ICD-10-CM: C85.90 ICD-9-CM: 202.80  11/24/2020 Unknown Review of Systems: As per HPI Vital Signs:  
 Last 24hrs VS reviewed since prior progress note. Most recent are: 
Visit Vitals BP (!) 163/60 (BP 1 Location: Right arm, BP Patient Position: At rest) Pulse 75 Temp 97.6 °F (36.4 °C) Resp 19 Ht 5' 3\" (1.6 m) Wt 73.5 kg (162 lb 0.6 oz) SpO2 100% BMI 28.70 kg/m² Intake/Output Summary (Last 24 hours) at 11/30/2020 1640 Last data filed at 11/30/2020 0700 Gross per 24 hour Intake  Output 775 ml Net -775 ml Physical Examination:  
 
I had a face to face encounter with this patient and independently examined them on 11/30/2020 as outlined below: 
 
     
Constitutional:  No acute distress Resp:  decreased breath sounds at bases, crackles + CV:  Regular rhythm, normal rate, no murmurs GI:  Soft, non distended,non tender, ventral hernia +, Musculoskeletal:  No LE edema Neurologic:  Moves all extremities. Awake and alert,oriented X 3 Psych:  Not anxious nor agitated. Data Review:  
 Review and/or order of clinical lab test,imaging Review and/or order of tests in the medicine section of CPT Labs:  
 
Recent Labs 11/30/20 
0350 11/29/20 
4206 WBC 8.6 6.2 HGB 10.8* 13.5 HCT 36.2 46.0  192 Recent Labs 11/30/20 
0350 11/29/20 
0850 11/28/20 
0222  138 138  
K 5.7* 5.0 5.8*  
* 108 109* CO2 22 25 22 BUN 56* 52* 55* CREA 1.69* 1.78* 1.85* * 209* 151* CA 9.4 9.4 9.7 PHOS 3.2 3.0  --   
 
Recent Labs 11/30/20 
0350 11/29/20 
0850 11/28/20 
0222 ALT 19  --  9* AP 133*  --  95  
TBILI 0.4  --  0.4 TP 6.6  --  6.8 ALB 2.5* 2.7* 2.5*  
GLOB 4.1*  --  4.3* No results for input(s): INR, PTP, APTT, INREXT, INREXT in the last 72 hours. No results for input(s): FE, TIBC, PSAT, FERR in the last 72 hours. No results found for: FOL, RBCF No results for input(s): PH, PCO2, PO2 in the last 72 hours. No results for input(s): CPK, CKNDX, TROIQ in the last 72 hours. No lab exists for component: CPKMB No results found for: CHOL, CHOLX, CHLST, CHOLV, HDL, HDLP, LDL, LDLC, DLDLP, TGLX, TRIGL, TRIGP, CHHD, CHHDX Lab Results Component Value Date/Time Glucose (POC) 221 (H) 11/30/2020 01:00 PM  
 Glucose (POC) 231 (H) 11/30/2020 08:33 AM  
 Glucose (POC) 244 (H) 11/29/2020 09:05 PM  
 Glucose (POC) 255 (H) 11/29/2020 04:27 PM  
 Glucose (POC) 299 (H) 11/29/2020 04:24 PM  
 
Lab Results Component Value Date/Time Color Yellow/Straw 11/24/2020 11:50 AM  
 Appearance Clear 11/24/2020 11:50 AM  
 Specific gravity 1.009 11/24/2020 11:50 AM  
 pH (UA) 7.0 11/24/2020 11:50 AM  
 Protein 100 (A) 11/24/2020 11:50 AM  
 Glucose 50 (A) 11/24/2020 11:50 AM  
 Ketone Negative 11/24/2020 11:50 AM  
 Bilirubin Negative 11/24/2020 11:50 AM  
 Urobilinogen 0.1 11/24/2020 11:50 AM  
 Nitrites Negative 11/24/2020 11:50 AM  
 Leukocyte Esterase Negative 11/24/2020 11:50 AM  
 Bacteria Negative 11/24/2020 11:50 AM  
 WBC 0-4 11/24/2020 11:50 AM  
 RBC 0-5 11/24/2020 11:50 AM  
 
 
 
Medications Reviewed:  
 
Current Facility-Administered Medications Medication Dose Route Frequency  linezolid in dextrose 5% (ZYVOX) IVPB premix in D5W 600 mg  600 mg IntraVENous Q12H  
 sodium chloride (NS) flush 5-40 mL  5-40 mL IntraVENous Q8H  
 sodium chloride (NS) flush 5-40 mL  5-40 mL IntraVENous PRN  
 hydrALAZINE (APRESOLINE) tablet 100 mg  100 mg Oral TID  anidulafungin (ERAXIS) 200 mg in 0.9% sodium chloride 260 mL IVPB  200 mg IntraVENous ONCE  
 [START ON 12/1/2020] anidulafungin (ERAXIS) 100 mg in 0.9% sodium chloride 130 mL IVPB  100 mg IntraVENous Q24H  
 vancomycin (FIRVANQ) 50 mg/mL oral solution 125 mg  125 mg Oral Q6H  
 nystatin (MYCOSTATIN) 100,000 unit/mL oral suspension 500,000 Units  500,000 Units Oral QID  methylPREDNISolone (PF) (SOLU-MEDROL) injection 40 mg  40 mg IntraVENous Q12H  
 metroNIDAZOLE (FLAGYL) IVPB premix 500 mg  500 mg IntraVENous Q8H  
 cefepime (MAXIPIME) 2 g in 0.9% sodium chloride (MBP/ADV) 100 mL MBP  2 g IntraVENous Q24H  
 tacrolimus (PROGRAF) capsule 2 mg  2 mg Oral Q12H  
 glucose chewable tablet 16 g  4 Tab Oral PRN  
 glucagon (GLUCAGEN) injection 1 mg  1 mg IntraMUSCular PRN  
 dextrose 10% infusion 0-250 mL  0-250 mL IntraVENous PRN  
 insulin lispro (HUMALOG) injection   SubCUTAneous AC&HS  
 oxyCODONE IR (ROXICODONE) tablet 5 mg  5 mg Oral Q4H PRN  
 hydrOXYzine HCL (ATARAX) tablet 10 mg  10 mg Oral TID PRN  pantoprazole (PROTONIX) 40 mg in 0.9% sodium chloride 10 mL injection  40 mg IntraVENous DAILY  allopurinoL (ZYLOPRIM) tablet 100 mg  100 mg Oral BID  mycophenolate mofetil (CELLCEPT) capsule 250 mg  250 mg Oral ACB&D  
 zinc sulfate (ZINCATE) 220 (50) mg capsule 1 Cap  1 Cap Oral DAILY  ascorbic acid (vitamin C) (VITAMIN C) tablet 500 mg  500 mg Oral BID  cholecalciferol (VITAMIN D3) (1000 Units /25 mcg) tablet 2 Tab  2,000 Units Oral DAILY  albuterol (PROVENTIL HFA, VENTOLIN HFA, PROAIR HFA) inhaler 2 Puff  2 Puff Inhalation Q4H PRN  zinc oxide-cod liver oil (DESITIN) 40 % paste   Topical Q8H  
 acetaminophen (TYLENOL) tablet 650 mg  650 mg Oral Q6H PRN Or  
 acetaminophen (TYLENOL) suppository 650 mg  650 mg Rectal Q6H PRN  
 sodium chloride (NS) flush 5-40 mL  5-40 mL IntraVENous Q8H  
 sodium chloride (NS) flush 5-40 mL  5-40 mL IntraVENous PRN  polyethylene glycol (MIRALAX) packet 17 g  17 g Oral DAILY PRN  
 enoxaparin (LOVENOX) injection 30 mg  30 mg SubCUTAneous Q24H  
 0.9% sodium chloride infusion  50 mL/hr IntraVENous CONTINUOUS  
 ondansetron (ZOFRAN) injection 4 mg  4 mg IntraVENous Q6H PRN  
 amLODIPine (NORVASC) tablet 10 mg  10 mg Oral DAILY  aspirin delayed-release tablet 81 mg  81 mg Oral DAILY  melatonin tablet 3 mg  3 mg Oral QHS  metoprolol tartrate (LOPRESSOR) tablet 50 mg  50 mg Oral BID  labetaloL (NORMODYNE;TRANDATE) injection 20 mg  20 mg IntraVENous Q4H PRN  
 
______________________________________________________________________ EXPECTED LENGTH OF STAY: 5d 12h ACTUAL LENGTH OF STAY:          6 Seth Saleh MD

## 2020-11-30 NOTE — PROGRESS NOTES
0900: Notified Dr. Anamika Zaldivar patient's potassium is 5.7. 
 
1800: Notified  Anamika Luciovirgie patient has not had BM last night or this shift and is stating she is having abdominal pain. Orders received for KUB. 1930: Bedside shift change report given to Paul Lopez RN (oncoming nurse) by Jordan Horan RN (offgoing nurse). Report included the following information SBAR, ED Summary, Intake/Output, MAR, Recent Results, Med Rec Status and Cardiac Rhythm NSR. Problem: Falls - Risk of 
Goal: *Absence of Falls Description: Document [facility] Fall Risk and appropriate interventions in the flowsheet. Outcome: Progressing Towards Goal 
Note: Fall Risk Interventions: 
  
 
Mentation Interventions: Adequate sleep, hydration, pain control, More frequent rounding, Reorient patient, Room close to nurse's station, Toileting rounds, Update white board Medication Interventions: Evaluate medications/consider consulting pharmacy, Patient to call before getting OOB, Teach patient to arise slowly Elimination Interventions: Call light in reach, Patient to call for help with toileting needs, Stay With Me (per policy), Toileting schedule/hourly rounds Problem: Pressure Injury - Risk of 
Goal: *Prevention of pressure injury Description: Document Bi Scale and appropriate interventions in the flowsheet. Outcome: Progressing Towards Goal 
Note: Pressure Injury Interventions: 
Sensory Interventions: (P) Assess need for specialty bed, Assess changes in LOC, Keep linens dry and wrinkle-free, Maintain/enhance activity level, Monitor skin under medical devices, Pressure redistribution bed/mattress (bed type) Moisture Interventions: (P) Absorbent underpads, Apply protective barrier, creams and emollients, Internal/External urinary devices, Limit adult briefs, Minimize layers Activity Interventions: (P) Assess need for specialty bed, Increase time out of bed, PT/OT evaluation, Pressure redistribution bed/mattress(bed type) Mobility Interventions: (P) Assess need for specialty bed, HOB 30 degrees or less, Pressure redistribution bed/mattress (bed type), PT/OT evaluation Nutrition Interventions: (P) Discuss nutritional consult with provider, Document food/fluid/supplement intake Friction and Shear Interventions: (P) Lift sheet, HOB 30 degrees or less, Apply protective barrier, creams and emollients, Minimize layers Problem: Airway Clearance - Ineffective Goal: *Patent airway Outcome: Progressing Towards Goal 
 Pt tolerating 2 LNC this shift. Problem: Patient Education: Go to Patient Education Activity Goal: Patient/Family Education Outcome: Progressing Towards Goal

## 2020-11-30 NOTE — PROGRESS NOTES
Orders received, chart reviewed and patient evaluated by physical therapy. Pending progression with skilled acute physical therapy, recommend: To be determined: hopeful for home with family assist, HHPT, and caregiver support. Rehab if not feasible. Recommend with nursing patient to complete as able in order to maintain strength, endurance and independence: OOB to chair 3x/day with assist x1 and ambulating with RW and assist x1. Thank you for your assistance. Full evaluation to follow.   
 
Nikki Meza, PT, DPT

## 2020-11-30 NOTE — PROGRESS NOTES
Problem: Self Care Deficits Care Plan (Adult) Goal: *Acute Goals and Plan of Care (Insert Text) Description:  
FUNCTIONAL STATUS PRIOR TO ADMISSION:  Patient reported she required assistance with bathing, dressing, and toileting, but performed grooming and feeding without assistance. Reports she was ambulatory with a RW 
 
HOME SUPPORT: Patient lived with spouse and hired caregivers to provide assistance. Occupational Therapy Goals Initiated 11/30/2020 1. Patient will perform grooming standing at sink with contact guard assistance within 7 day(s). 2.  Patient will perform bathing with minimal assistance within 7 day(s). 3.  Patient will perform upper body dressing with supervision/set-up within 7 day(s). 4.  Patient will perform toilet transfers with contact guard assist within 7 day(s). 5.  Patient will perform all aspects of toileting with minimal assistance within 7 day(s). 6.  Patient will participate in upper extremity therapeutic exercise/activities with supervision/set-up for 10 minutes within 7 day(s). 7.  Patient will utilize energy conservation techniques during functional activities with verbal cues within 7 day(s) Outcome: Not Met OCCUPATIONAL THERAPY EVALUATION Patient: Anca Pinto (30 y.o. female) Date: 11/30/2020 Primary Diagnosis: Lymphoma ([facility] [address] Tawanna Ann Precautions: fall ASSESSMENT Based on the objective data described below, the patient presents with impaired functional endurance, impaired distal LB reach for ADLs, and impaired standing balance. Patient was agreeable to mobilize OOB and required minimum assistance x2 for sit-stand + ambulating short distance to chair with b/l HHA. PTA, patient was ambulatory with RW and required assistance for bathing, dressing, and toileting. She lived with her  + private caregiver assistance. Pending progress in acute setting, recommend d/c home with [facility] and continued caregiver assistance. Current Level of Function Impacting Discharge (ADLs/self-care): set-up to minimum assistance UB ADLs, maximum assistance LB ADLd Functional Outcome Measure: The patient scored 40/100 on the Barthel Index outcome measure which is indicative of ~60% impairment in functional performance. Patient will benefit from skilled therapy intervention to address the above noted impairments. PLAN : 
Recommendations and Planned Interventions: self care training, functional mobility training, therapeutic exercise, balance training, therapeutic activities, endurance activities, patient education, home safety training, and family training/education Frequency/Duration: Patient will be followed by occupational therapy 3 times a week to address goals. Recommendation for discharge: (in order for the patient to meet his/her long term goals) Pending progress in acute setting, recommend d/c home with [facility] and continued caregiver assistance. This discharge recommendation: 
Has not yet been discussed the attending provider and/or case management SUBJECTIVE:  
Patient stated Okay.  OBJECTIVE DATA SUMMARY:  
HISTORY:  
Past Medical History:  
Diagnosis Date Chronic kidney disease Diabetes ([facility] [address] Heart failure ([facility] [address] Hypertension Kidney disease Sleep apnea Throat cancer ([facility] [address] Past Surgical History:  
Procedure Laterality Date HX CHOLECYSTECTOMY HX RENAL TRANSPLANT Expanded or extensive additional review of patient history:  
 
Home Situation Home Environment: Apartment One/Two Story Residence: One story Living Alone: No 
Support Systems: Spouse/Significant Other/Partner, Home care staff Current DME Used/Available at Home: Walker, rolling, Cane, straight EXAMINATION OF PERFORMANCE DEFICITS: 
Cognitive/Behavioral Status: 
Neurologic State: Alert Orientation Level: Oriented to person;Oriented to place;Oriented to situation;Disoriented to time Cognition: Follows commands Perception: Appears intact Skin: visible skin appears intact Edema: none noted Hearing: WDL Vision/Perceptual:   
    
    
    
  
    
Acuity: Within Defined Limits Range of Motion: 
 
AROM: Generally decreased, functional 
  
  
  
  
  
  
  
 
Strength: 
 
Strength: Generally decreased, functional 
  
  
  
  
 
Coordination: 
Coordination: Generally decreased, functional 
Fine Motor Skills-Upper: Left Intact; Right Intact Gross Motor Skills-Upper: Left Intact; Right Intact Tone & Sensation: 
 
Tone: Normal 
Sensation: Intact Balance: 
Sitting: Intact; Without support Standing: Impaired; With support Standing - Static: Fair;Constant support Standing - Dynamic : Fair;Constant support Functional Mobility and Transfers for ADLs: 
 
 
Transfers: 
Sit to Stand: Minimum assistance;Assist x2 Stand to Sit: Minimum assistance;Assist x2 Bed to Chair: Minimum assistance;Assist x2 ADL Assessment: 
Feeding: Independent(inferred) Oral Facial Hygiene/Grooming: Setup(inferred) Bathing: Moderate assistance(inferred d/t endurance, LB access) Upper Body Dressing: Minimum assistance(inferred) Lower Body Dressing: Maximum assistance(inferred d/t impaired mobility, LB reach, balance) Toileting: Maximum assistance(inferred) Functional Measure: 
Barthel Index: 
 
Bathin Bladder: 0 Bowels: 10 
Groomin Dressin Feeding: 10 Mobility: 0 Stairs: 0 Toilet Use: 0 Transfer (Bed to Chair and Back): 10 Total: 40/100 The Barthel ADL Index: Guidelines 1. The index should be used as a record of what a patient does, not as a record of what a patient could do. 2. The main aim is to establish degree of independence from any help, physical or verbal, however minor and for whatever reason. 3. The need for supervision renders the patient not independent.  
4. A patient's performance should be established using the best available evidence. Asking the patient, friends/relatives and nurses are the usual sources, but direct observation and common sense are also important. However direct testing is not needed. 5. Usually the patient's performance over the preceding 24-48 hours is important, but occasionally longer periods will be relevant. 6. Middle categories imply that the patient supplies over 50 per cent of the effort. 7. Use of aids to be independent is allowed. Shaneka Boyce., Barthel, D.W. ([id number]). Functional evaluation: the Barthel Index. [address] [facility] (14)2. Janet Bhardwaj lizbeth SHAI Emery, Vanessa Alaniz., Jose Eduardo Villaseñor., Paulette, [address] (1999). Measuring the change indisability after inpatient rehabilitation; comparison of the responsiveness of the Barthel Index and Functional [place] Measure. Journal of Neurology, Neurosurgery, and Psychiatry, 66(4), 265-934. MOE BraunJ.A, BETTY Hernandez, & Marli Hinojosa MCamposA. (2004.) Assessment of post-stroke quality of life in cost-effectiveness studies: The usefulness of the Barthel Index and the EuroQoL-5D. [facility], 13, 276-74 Occupational Therapy Evaluation Charge Determination History Examination Decision-Making LOW Complexity : Brief history review  MEDIUM Complexity : 3-5 performance deficits relating to physical, cognitive , or psychosocial skils that result in activity limitations and / or participation restrictions MEDIUM Complexity : Patient may present with comorbidities that affect occupational performnce. Miniml to moderate modification of tasks or assistance (eg, physical or verbal ) with assesment(s) is necessary to enable patient to complete evaluation Based on the above components, the patient evaluation is determined to be of the following complexity level: LOW Pain Rating: 
Patient did not report pain Activity Tolerance:  
VSS, fair After treatment patient left in no apparent distress:   
Sitting in chair, Call bell within reach, and Bed / chair alarm activated COMMUNICATION/EDUCATION:  
The patients plan of care was discussed with: Physical therapist and Registered nurse. Home safety education was provided and the patient/caregiver indicated understanding., Patient/family have participated as able in goal setting and plan of care. , and Patient/family agree to work toward stated goals and plan of care. This patients plan of care is appropriate for delegation to JONES. Thank you for this referral. 
Dawit Hobson OT Time Calculation: 32 mins

## 2020-11-30 NOTE — WOUND CARE
WOCN Note:  
  
Reconsult placed for gluteal cleft assessment. Patient in room 421. Covid +; used PAPR and gloves and gown. Jeni Marquez RN at the bedside for assessment. 
  
Chart reviewed. Admitted DX:  Lymphoma ([facility] [address] Jose Wren Past Medical History:  
    
Past Medical History:  
Diagnosis Date  Chronic kidney disease    
 Diabetes ([facility] [address]    
 Heart failure (HCC)    
 Hypertension    
 Kidney disease    
 Sleep apnea    
 Throat cancer [facility])    
  
  
Assessment:  
Patient is alert, uses brief and sitting up in chair. Assisted back to bed. Bed:  foam mattress 
  
1. Gluteal cleft, scattered red partial thickness wounds from moisture. Cleansed and applied stoma powder and Desitin. 
  
Wound, Pressure Prevention & Skin Care Recommendations: 1. Minimize layers of linen/pads under patient to optimize support surface. 2.  Turn/reposition approximately every 2 hours and offload heels. 3.  Manage moisture/ Keep skin folds clean and dry. 4.  Gluteal cleft:  Cleanse and apply stoma powder and Desitin Q8 and with each incontinence episode. 
  
Discussed above plan with Jeni Marquez RN. 
  
Transition of Care: Plan to follow as needed while admitted to hospital. 
  
Jf Britt, SUEN RN [facility] [place] Inpatient Wound Care Available on [employer] Pager [phone] Office [phone]

## 2020-11-30 NOTE — ROUTINE PROCESS
TRANSFER - IN REPORT: 
 
Verbal report received from [facility]) on Berna Garcia  being received from (unit) for urgent transfer Report consisted of patients Situation, Background, Assessment and  
Recommendations(SBAR). Information from the following report(s) SBAR, Kardex, Intake/Output, MAR, Accordion and Recent Results was reviewed with the receiving nurse. Opportunity for questions and clarification was provided. Assessment completed upon patients arrival to unit and care assumed. Primary Nurse Tammie Onofre and Gissel Sierra RN performed a dual skin assessment on this patient Impairment noted- see wound doc flow sheet Bi score is

## 2020-11-30 NOTE — ROUTINE PROCESS
TRANSFER - OUT REPORT: 
 
Verbal report given to Leisa VILLAGRAN(name) on Ebenezer Harmon  being transferred to [facility]) for change in patient condition( possible airway compromise/poor abg results) Report consisted of patients Situation, Background, Assessment and  
Recommendations(SBAR). Information from the following report(s) SBAR was reviewed with the receiving nurse. Lines:  
Peripheral IV 11/24/20 Right Hand (Active) Site Assessment Clean, dry, & intact 11/29/20 1639 Phlebitis Assessment 0 11/29/20 1639 Infiltration Assessment 0 11/29/20 1639 Dressing Status Clean, dry, & intact 11/29/20 1639 Dressing Type Transparent 11/29/20 1639 Hub Color/Line Status Pink; Infusing 11/29/20 1639 Action Taken Open ports on tubing capped 11/29/20 1639 Alcohol Cap Used Yes 11/29/20 1639 Peripheral IV 11/25/20 Left Antecubital (Active) Site Assessment Clean, dry, & intact 11/29/20 1639 Phlebitis Assessment 0 11/29/20 1639 Infiltration Assessment 0 11/29/20 1639 Dressing Status Clean, dry, & intact 11/29/20 1639 Dressing Type Transparent 11/29/20 1639 Hub Color/Line Status Pink;Capped 11/29/20 1639 Action Taken Open ports on tubing capped 11/29/20 1639 Alcohol Cap Used Yes 11/29/20 1639 Opportunity for questions and clarification was provided. Patient transported with: 
 Monitor O2 @ 3 liters Registered Nurse

## 2020-12-01 PROBLEM — N17.9 ACUTE KIDNEY INJURY SUPERIMPOSED ON CKD (HCC): Status: ACTIVE | Noted: 2020-01-01

## 2020-12-01 PROBLEM — T38.0X5A STEROID-INDUCED HYPERGLYCEMIA: Status: ACTIVE | Noted: 2020-01-01

## 2020-12-01 PROBLEM — J80 ACUTE RESPIRATORY DISTRESS SYNDROME (ARDS) DUE TO COVID-19 VIRUS (HCC): Status: ACTIVE | Noted: 2020-01-01

## 2020-12-01 PROBLEM — R73.9 STEROID-INDUCED HYPERGLYCEMIA: Status: ACTIVE | Noted: 2020-01-01

## 2020-12-01 PROBLEM — U07.1 ACUTE HYPOXEMIC RESPIRATORY FAILURE DUE TO COVID-19 (HCC): Status: ACTIVE | Noted: 2020-01-01

## 2020-12-01 PROBLEM — Z79.82 LONG-TERM USE OF ASPIRIN THERAPY: Status: ACTIVE | Noted: 2020-01-01

## 2020-12-01 PROBLEM — J98.8 RESPIRATORY TRACT INFECTION DUE TO COVID-19 VIRUS: Status: ACTIVE | Noted: 2020-01-01

## 2020-12-01 PROBLEM — J96.01 ACUTE HYPOXEMIC RESPIRATORY FAILURE DUE TO COVID-19 (HCC): Status: ACTIVE | Noted: 2020-01-01

## 2020-12-01 PROBLEM — E86.0 SEVERE DEHYDRATION: Status: ACTIVE | Noted: 2020-01-01

## 2020-12-01 PROBLEM — N18.32 STAGE 3B CHRONIC KIDNEY DISEASE (HCC): Status: ACTIVE | Noted: 2020-01-01

## 2020-12-01 PROBLEM — U07.1 ACUTE RESPIRATORY DISTRESS SYNDROME (ARDS) DUE TO COVID-19 VIRUS (HCC): Status: ACTIVE | Noted: 2020-01-01

## 2020-12-01 PROBLEM — N18.9 ACUTE KIDNEY INJURY SUPERIMPOSED ON CKD (HCC): Status: ACTIVE | Noted: 2020-01-01

## 2020-12-01 PROBLEM — U07.1 RESPIRATORY TRACT INFECTION DUE TO COVID-19 VIRUS: Status: ACTIVE | Noted: 2020-01-01

## 2020-12-01 PROBLEM — N18.4 KIDNEY DISEASE, CHRONIC, STAGE IV (SEVERE, EGFR 15-29 ML/MIN) (HCC): Status: ACTIVE | Noted: 2020-01-01

## 2020-12-01 PROBLEM — G47.33 OSA (OBSTRUCTIVE SLEEP APNEA): Chronic | Status: ACTIVE | Noted: 2020-01-01

## 2020-12-01 NOTE — PROGRESS NOTES
Problem: Mobility Impaired (Adult and Pediatric) Goal: *Acute Goals and Plan of Care (Insert Text) Description: FUNCTIONAL STATUS PRIOR TO ADMISSION: Patient was modified independent using a rolling walker vs SPC for functional mobility. HOME SUPPORT PRIOR TO ADMISSION: The patient lived with her spouse and had additional home care staff assistance. Physical Therapy Goals Initiated 11/30/2020 1. Patient will move from supine to sit and sit to supine  and scoot up and down in bed with supervision/set-up within 7 day(s). 2.  Patient will transfer from bed to chair and chair to bed with supervision/set-up using the least restrictive device within 7 day(s). 3.  Patient will perform sit to stand with supervision/set-up within 7 day(s). 4.  Patient will ambulate with supervision/set-up for 100 feet with the least restrictive device within 7 day(s). Outcome: Progressing Towards Goal 
 
PHYSICAL THERAPY TREATMENT Patient: [place] (27 y.o. female) Date: 12/1/2020 Diagnosis: Lymphoma ([address] Cadence Brimfield <principal problem not specified> Precautions: (droplet, COVID+) Chart, physical therapy assessment, plan of care and goals were reviewed. ASSESSMENT Patient continues with skilled PT services and is progressing towards goals. Pt quiet difficult to understand or respond to questions. Pt initiated movement but during gait pt unsteady. Pt had decrease stability with gait. Chair brought to pt. Pt reports dizziness and  SOB, VSS. Pt will need to increase mobility and decrease assistance if planning on returning home. Pt is tolerating OOB to chair. Current Level of Function Impacting Discharge (mobility/balance): Mod A Other factors to consider for discharge:decrease mobility increase assist level. PLAN : 
Patient continues to benefit from skilled intervention to address the above impairments. Continue treatment per established plan of care. to address goals. Recommendation for discharge: (in order for the patient to meet his/her long term goals) To be determined: depending on progression HHPT with assistance of unable to provide assistance then SNF This discharge recommendation: 
Has not yet been discussed the attending provider and/or case management IF patient discharges home will need the following DME: to be determined (TBD) SUBJECTIVE:  
Patient stated ok.  when asked to get up OBJECTIVE DATA SUMMARY:  
Critical Behavior: 
Neurologic State: Alert, Confused Orientation Level: Oriented to person, Oriented to place, Oriented to situation, Disoriented to time Cognition: Poor safety awareness, Follows commands Safety/Judgement: Not assessed Functional Mobility Training: 
Bed Mobility: 
  
  
  
  
  
  
Transfers: 
Sit to Stand: Minimum assistance Stand to Sit: Moderate assistance(decrease control) Balance: 
Sitting: Impaired Sitting - Static: Good (unsupported) Sitting - Dynamic: Fair (occasional) Standing: Impaired Standing - Static: Fair Standing - Dynamic : Fair Ambulation/Gait Training: 
Distance (ft): 5 Feet (ft) Assistive Device: Gait belt;Walker, rolling Ambulation - Level of Assistance: Moderate assistance Gait Abnormalities: Decreased step clearance Base of Support: Narrowed Speed/Zina: Shuffled; Slow Step Length: Left shortened;Right shortened Stairs: Therapeutic Exercises:  
 
Pain Rating: No complaints Activity Tolerance:  
Poor After treatment patient left in no apparent distress:  
Sitting in chair, Call bell within reach and Bed / chair alarm activated COMMUNICATION/COLLABORATION:  
The patients plan of care was discussed with: Registered nurse.   
 
Marquis Barbara PTA

## 2020-12-01 NOTE — PROGRESS NOTES
Nephrology Progress Note Sarah Acosta Date of Admission : 11/24/2020 CC: Follow up for  Renal Tx Assessment and Plan Renal Transplant: 
- cont current IS meds - MMF, prograf, IV solumedrol 
- reduced prograf to 1.5 mg BID  
- daily tac levels  
- one dose of Lasix 20 mg IV  
- continue IVF due to NPO status  
- daily labs Hyperkalemia: 
-low K diet  
-one more dose of SPS 
- spot urine K  
  
CKD IV: 
- baseline Cr appears to be around 2 COVID-19 
  
Diffuse large B cell lymphoma Leucopenia  
- s/p Rituximab 11/26 HTN: 
- stable  
  
DM2: 
- on insulin Interval History: 
Cr stable K increased Breathing labored today REPORTS SLIGHT abdo discomfort otherwise no new sx Current Medications: all current  Medications have been eviewed in [facility] Review of Systems: Pertinent items are noted in HPI. Objective: 
Vitals:   
Vitals:  
 11/30/20 1919 11/30/20 2202 11/30/20 2317 12/01/20 1437 BP: 132/75 (!) 184/64 (!) 172/55 (!) 152/68 Pulse: 67 70 68 65 Resp: 12  18 18 Temp: 97.7 °F (36.5 °C)  98 °F (36.7 °C) 97.6 °F (36.4 °C) SpO2: 95%  99% Weight:    73.4 kg (161 lb 13.1 oz) Height:      
 
Intake and Output: 
11/30 1901 - 12/01 0700 In: -  
Out: 134 [CTCKJ:103] 11/29 0701 - 11/30 1900 In: -  
Out: 2405 [[id number]] Physical Examination:  
 
General:laboured breathing HEENT  Pallor + NECK : SUPPLE Resp: CTA B/L  
CV:rrr, no murmur Neurologic:  Non focal 
:+ knox []    High complexity decision making was performed 
[]    Patient is at high-risk of decompensation with multiple organ involvement Lab Data Personally Reviewed: I have reviewed all the pertinent labs, microbiology data and radiology studies during assessment. Recent Labs 12/01/20 
99 [id number] 11/30/20 
0350 11/29/20 
5177 * 139 138  
K 6.2* 5.7* 5.0  
* 109* 108 CO2 17* 22 25 * 240* 209* BUN 49* 56* 52* CREA 1.65* 1.69* 1.78* CA 9.2 9.4 9.4 PHOS  -- 3.2 3.0 ALB 2.5* 2.5* 2.7* ALT 17 19  --   
 
Recent Labs 12/01/20 
2477 11/30/20 
0350 11/29/20 
0949 WBC 9.4 8.6 6.2 HGB 10.8* 10.8* 13.5 HCT 36.4 36.2 46.0  155 192 No results found for: SDES Lab Results Component Value Date/Time Culture result: MRSA NOT PRESENT 11/25/2020 12:11 PM  
 Culture result:  11/25/2020 12:11 PM  
      Screening of patient nares for MRSA is for surveillance purposes and, if positive, to facilitate isolation considerations in high risk settings. It is not intended for automatic decolonization interventions per se as regimens are not sufficiently effective to warrant routine use. Culture result: No growth 6 days 11/24/2020 10:24 AM  
 
Recent Results (from the past 24 hour(s)) GLUCOSE, POC Collection Time: 11/30/20  8:33 AM  
Result Value Ref Range Glucose (POC) 231 (H) 65 - 100 mg/dL Performed by Genaro Lopez GLUCOSE, POC Collection Time: 11/30/20  1:00 PM  
Result Value Ref Range Glucose (POC) 221 (H) 65 - 100 mg/dL Performed by Genaro Aguirres GLUCOSE, POC Collection Time: 11/30/20  5:08 PM  
Result Value Ref Range Glucose (POC) 234 (H) 65 - 100 mg/dL Performed by Christian Mahmood GLUCOSE, POC Collection Time: 11/30/20  9:46 PM  
Result Value Ref Range Glucose (POC) 226 (H) 65 - 100 mg/dL Performed by [facility] CBC WITH AUTOMATED DIFF Collection Time: 12/01/20  3:57 AM  
Result Value Ref Range WBC 9.4 3.6 - 11.0 K/uL  
 RBC 4.03 3.80 - 5.20 M/uL  
 HGB 10.8 (L) 11.5 - 16.0 g/dL HCT 36.4 35.0 - 47.0 % MCV 90.3 80.0 - 99.0 FL  
 MCH 26.8 26.0 - 34.0 PG  
 MCHC 29.7 (L) 30.0 - 36.5 g/dL  
 RDW 18.6 (H) 11.5 - 14.5 % PLATELET 146 331 - 926 K/uL MPV 10.8 8.9 - 12.9 FL  
 NRBC 0.0 0  WBC ABSOLUTE NRBC 0.00 0.00 - 0.01 K/uL NEUTROPHILS 96 (H) 32 - 75 % LYMPHOCYTES 1 (L) 12 - 49 % MONOCYTES 2 (L) 5 - 13 % EOSINOPHILS 0 0 - 7 % BASOPHILS 0 0 - 1 %  IMMATURE GRANULOCYTES 1 (H) 0.0 - 0.5 % ABS. NEUTROPHILS 9.0 (H) 1.8 - 8.0 K/UL  
 ABS. LYMPHOCYTES 0.1 (L) 0.8 - 3.5 K/UL  
 ABS. MONOCYTES 0.2 0.0 - 1.0 K/UL  
 ABS. EOSINOPHILS 0.0 0.0 - 0.4 K/UL  
 ABS. BASOPHILS 0.0 0.0 - 0.1 K/UL  
 ABS. IMM. GRANS. 0.1 (H) 0.00 - 0.04 K/UL  
 DF SMEAR SCANNED    
 PLATELET COMMENTS Large Platelets RBC COMMENTS ANISOCYTOSIS 1+ 
    
 RBC COMMENTS NATE CELLS 1+ 
    
D DIMER Collection Time: 12/01/20  4:57 AM  
Result Value Ref Range D-dimer 1.00 (H) 0.00 - 0.65 mg/L FEU METABOLIC PANEL, COMPREHENSIVE Collection Time: 12/01/20  4:57 AM  
Result Value Ref Range Sodium 134 (L) 136 - 145 mmol/L Potassium 6.2 (H) 3.5 - 5.1 mmol/L Chloride 110 (H) 97 - 108 mmol/L  
 CO2 17 (L) 21 - 32 mmol/L Anion gap 7 5 - 15 mmol/L Glucose 249 (H) 65 - 100 mg/dL BUN 49 (H) 6 - 20 MG/DL Creatinine 1.65 (H) 0.55 - 1.02 MG/DL  
 BUN/Creatinine ratio 30 (H) 12 - 20 GFR est AA 37 (L) >60 ml/min/1.73m2 GFR est non-AA 31 (L) >60 ml/min/1.73m2 Calcium 9.2 8.5 - 10.1 MG/DL Bilirubin, total 0.5 0.2 - 1.0 MG/DL  
 ALT (SGPT) 17 12 - 78 U/L  
 AST (SGOT) 34 15 - 37 U/L Alk. phosphatase 144 (H) 45 - 117 U/L Protein, total 7.0 6.4 - 8.2 g/dL Albumin 2.5 (L) 3.5 - 5.0 g/dL Globulin 4.5 (H) 2.0 - 4.0 g/dL A-G Ratio 0.6 (L) 1.1 - 2.2 FERRITIN Collection Time: 12/01/20  4:57 AM  
Result Value Ref Range Ferritin 1,158 (H) 8 - 252 NG/ML  
C REACTIVE PROTEIN, QT Collection Time: 12/01/20  4:57 AM  
Result Value Ref Range C-Reactive protein 7.25 (H) 0.00 - 0.60 mg/dL LD Collection Time: 12/01/20  4:57 AM  
Result Value Ref Range  (H) 81 - 246 U/L Randal Heard MD 
[facility]  
[address], [address] [facility] Phone - [phone] Fax - [phone] 
[email or web address]

## 2020-12-01 NOTE — PROGRESS NOTES
Bedside and Verbal shift change report given to SPARKLE Royal (oncoming nurse) by Kye Arreola (offgoing nurse). Report included the following information SBAR, Kardex, Intake/Output, MAR, Accordion, Recent Results, Cardiac Rhythm NSR and Quality Measures. Problem: Falls - Risk of 
Goal: *Absence of Falls Description: Document Jesús Steen Fall Risk and appropriate interventions in the flowsheet. Outcome: Progressing Towards Goal 
Note: Fall Risk Interventions: 
Mobility Interventions: Communicate number of staff needed for ambulation/transfer, Patient to call before getting OOB, PT Consult for mobility concerns, Utilize walker, cane, or other assistive device Mentation Interventions: Adequate sleep, hydration, pain control, Bed/chair exit alarm, Increase mobility, More frequent rounding, Reorient patient, Room close to nurse's station, Toileting rounds, Update white board Medication Interventions: Bed/chair exit alarm, Evaluate medications/consider consulting pharmacy, Patient to call before getting OOB, Teach patient to arise slowly Elimination Interventions: Bed/chair exit alarm, Call light in reach, Patient to call for help with toileting needs, Stay With Me (per policy), Toilet paper/wipes in reach, Toileting schedule/hourly rounds Problem: Pressure Injury - Risk of 
Goal: *Prevention of pressure injury Description: Document Bi Scale and appropriate interventions in the flowsheet. Outcome: Progressing Towards Goal 
Note: Pressure Injury Interventions: 
Sensory Interventions: Assess changes in LOC, Check visual cues for pain, Float heels, Keep linens dry and wrinkle-free, Minimize linen layers, Pressure redistribution bed/mattress (bed type), Turn and reposition approx. every two hours (pillows and wedges if needed) Moisture Interventions: Absorbent underpads, Apply protective barrier, creams and emollients, Check for incontinence Q2 hours and as needed, Internal/External urinary devices, Maintain skin hydration (lotion/cream), Minimize layers, Moisture barrier Activity Interventions: Increase time out of bed, Pressure redistribution bed/mattress(bed type), PT/OT evaluation Mobility Interventions: Float heels, Pressure redistribution bed/mattress (bed type), PT/OT evaluation, Turn and reposition approx. every two hours(pillow and wedges) Nutrition Interventions: Document food/fluid/supplement intake Friction and Shear Interventions: Apply protective barrier, creams and emollients, Minimize layers Problem: Airway Clearance - Ineffective Goal: *Patent airway Outcome: Progressing Towards Goal 
Goal: *Absence of airway secretions Outcome: Progressing Towards Goal 
Note: No secretions but yankeur set up in room if needed Problem: Breathing Pattern - Ineffective Goal: *Absence of hypoxia Outcome: Progressing Towards Goal 
Note: Patient on 2L NC, which is patient baseline and sats above 96% Problem: Risk for Spread of Infection Goal: Prevent transmission of infectious organism to others Description: Prevent the transmission of infectious organisms to other patients, staff members, and visitors. Outcome: Progressing Towards Goal 
Note: Droplet plus and airborne Problem: Pain Goal: *Control of Pain Outcome: Progressing Towards Goal 
Note: Patient denies pain at this time Problem: Diabetes Self-Management Goal: *Monitoring blood glucose, interpreting and using results Description: Identify recommended blood glucose targets  and personal targets. Outcome: Progressing Towards Goal 
Note: ACHS checks

## 2020-12-01 NOTE — CONSULTS
Pulmonary Due to COVID-19 pandemic and in efforts to decrease risk of spread and potential spread of virus as well as to conserve personal protective equipment, direct patient contact is being limited where clinically appropriate. Chart reviewed. All pertinent images, lab studies, and medical testing have been reviewed (images independently viewed). Pulmonary recommendations are being made to the primary medical team. 
 
Pulmonary Impression:       
 
Acute hypoxemic respiratory failure with bilateral infiltrates presumably secondary to COVID 19 pneumonia but patient is chronically immunosuppressed from renal transplant and could have other organisms playing a role - on broad abx. May also have a degree of volume overload (Echo with EF 60-65% and grade 1 diastolic dysfunction) COVID 19 pneumonia B cell lymphoma new diagnosis with enlarged lymph nodes including peritonsillar mass with some element of airway compromise by neck CT scan (Heme onc following) CKD - s/p renal transplant on immunosuppression  
 
c diff positive DM Pulmonary Recommendations:  
 
--O2 support 
--continue abx and antifungals as well as oral vanc per ID 
--to receive convalescent plasma - not a candidate for remdesivir due to renal function. Is receiving steroids 
--receiving lovenox 
--COVINOX study has ended and pulsed Fabio is not available any longer --on lovenox and following inflammatory markers 
--Check a pro BNP 
--Bronchoscopy contraindicated with COVID positive status Patient is at high risk for worsening resp compromise History:        
70 yo female with h/o CKD and s/p renal transplant and a new Dx of B cell lymphoma who developed resp distress during portacath placement at an outside facility and was transferred to [facility]. She has been found to be COVID positive. She is having increased O2 requirement and CXR shows worsening bilateral infiltrates.   She has been started on abx  (including Oral Vanc for c diff positive) and is on steroids and to receive convalescent plasma. She has been seen by ID and is not a Remdesivir candidate due to decreased renal function. Vital Signs:      
Patient Vitals for the past 4 hrs: 
 BP Pulse Resp SpO2  
20 1346    (!) 89 % 20 1104 (!) 137/51 (!) 59 14 100 % Temp (24hrs), Av.7 °F (36.5 °C), Min:97.6 °F (36.4 °C), Max:98 °F (36.7 °C) CVP:       
 
Intake/Output Summary (Last 24 hours) at 2020 1458 Last data filed at 2020 0400 Gross per 24 hour Intake  Output 1000 ml Net -1000 ml Blood Sugar: 
Lab Results Component Value Date/Time Glucose (POC) 285 (H) 2020 11:24 AM  
 Glucose (POC) 266 (H) 2020 08:34 AM  
 Glucose (POC) 226 (H) 2020 09:46 PM  
 Glucose (POC) 234 (H) 2020 05:08 PM  
 Glucose (POC) 221 (H) 2020 01:00 PM  
 
 
Physical Exam:     
Deferred Data Review:     
Radiology images independently viewed CXR  - worsening  Bilateral infiltrates Labs: 
Lab: 
Recent Labs 20 
1112 20 
0458 20 
0457 20 
0357 20 
0350 20 
1807 20 
0850 WBC  --   --   --  9.4 8.6  --  6.2 HGB  --   --   --  10.8* 10.8*  --  13.5 PLT  --   --   --  222 155  --  192 * 140 134*  --  139  --  138  
K 3.7 6.5* 6.2*  --  5.7*  --  5.0  
* 114* 110*  --  109*  --  108 CO2 14* 14* 17*  --  22  --  25 BUN 42* 49* 49*  --  56*  --  52* CREA 1.53* 1.72* 1.65*  --  1.69*  --  1.78* * 240* 249*  --  240*  --  209* CA 5.9* 9.4 9.2  --  9.4  --  9.4 PHOS  --  3.0  --   --  3.2  --  3.0 INR 1.2*  --   --   --   --   --   --   
TBILI  --   --  0.5  --  0.4  --   --   
LAC  --   --   --   --   --  1.0  --   
      
ABG: 
Recent Labs  
  20 
1740 PHI 7.31* 7.27* PCO2I 43.9 49.0*  
PO2I 69* 82 HCO3I 22.2 22.3 SO2I 92 94 Microbiology: 
 
  
Carson Clark MD

## 2020-12-01 NOTE — CONSULTS
[facility] 
 
ICU Intensivist- Critical Care Progress Note Name: Aleida Terrazas : 1950 MRN: [id number] Admit: 2020  5:37 PM   
 
Diagnosis:  
 
Principal Problem: 
  Acute respiratory distress syndrome (ARDS) due to COVID-19 virus Problem List: 
  Septic shock Severe dehydration Steroid-induced hyperglycemia Severe sepsis with acute organ dysfunction Respiratory tract infection due to COVID-19 virus Acute hypoxemic respiratory failure due to COVID-19 Acute kidney injury superimposed on CKD (Stage IV) Kidney disease, chronic, stage IV (severe, EGFR 15-29 ml/min) Post-transplant lymphoproliferative disorder Long-term use of aspirin therapy EMMANUEL (obstructive sleep apnea) CHF (congestive heart failure) Tonsillar mass Lymphoma ICU Comprehensive Plan of Care:  
 
Plans for this Shift: 1. Septic shock with acute organ dysfunction due to [place] Novel Coronavirus (2019COV SARS2) community-acquired pneumonia- moribund. 2. ARDS due to COVID-19 virus- multiorgan system failure this immunocompromised transplant recipient victim of overwhelming sepsis. Subjective:  
 
Progress Note:  
2020  
6:49 PM  
 
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 Transferred from [facility] with ARDS due to [place] Novel Coronavirus (2019-CoV SARS-2) community acquired pneumonia; diagnosed during presurgical testing, prior to implantable vascular access port insertion (for chemotherapy). Reason for ICU Admission:  
Acute respiratory distress syndrome (ARDS) due to COVID-19 virus ([address] Past Medical History:  
 
 has a past medical history of Chronic kidney disease, Diabetes ([address] Heart failure ([address] Hypertension, Kidney disease, Long-term use of aspirin therapy (12/1/2020), Sleep apnea, and Throat cancer ([facility] [address] Past Surgical History:  
 
 has a past surgical history that includes hx cholecystectomy and hx renal transplant. Current Medications:  
 
Current Facility-Administered Medications Medication Dose Route Frequency  tacrolimus (PROGRAF) capsule 1.5 mg  1.5 mg Oral Q12H  
 sodium bicarbonate (8.4%) 150 mEq in sterile water 1,000 mL infusion   IntraVENous CONTINUOUS  
 insulin glargine (LANTUS) injection 7 Units  7 Units SubCUTAneous DAILY  0.9% sodium chloride infusion 250 mL  250 mL IntraVENous PRN  
 linezolid in dextrose 5% (ZYVOX) IVPB premix in D5W 600 mg  600 mg IntraVENous Q12H  
 sodium chloride (NS) flush 5-40 mL  5-40 mL IntraVENous Q8H  
 sodium chloride (NS) flush 5-40 mL  5-40 mL IntraVENous PRN  
 hydrALAZINE (APRESOLINE) tablet 100 mg  100 mg Oral TID  anidulafungin (ERAXIS) 100 mg in 0.9% sodium chloride 130 mL IVPB  100 mg IntraVENous Q24H  cloNIDine HCL (CATAPRES) tablet 0.1 mg  0.1 mg Oral BID  doxycycline (VIBRAMYCIN) 100 mg in 0.9% sodium chloride (MBP/ADV) 100 mL MBP  100 mg IntraVENous Q12H  
 vancomycin (FIRVANQ) 50 mg/mL oral solution 125 mg  125 mg Oral Q6H  
 nystatin (MYCOSTATIN) 100,000 unit/mL oral suspension 500,000 Units  500,000 Units Oral QID  methylPREDNISolone (PF) (SOLU-MEDROL) injection 40 mg  40 mg IntraVENous Q12H  
 metroNIDAZOLE (FLAGYL) IVPB premix 500 mg  500 mg IntraVENous Q8H  
 cefepime (MAXIPIME) 2 g in 0.9% sodium chloride (MBP/ADV) 100 mL MBP  2 g IntraVENous Q24H  
 glucose chewable tablet 16 g  4 Tab Oral PRN  
 glucagon (GLUCAGEN) injection 1 mg  1 mg IntraMUSCular PRN  
 dextrose 10% infusion 0-250 mL  0-250 mL IntraVENous PRN  
 insulin lispro (HUMALOG) injection   SubCUTAneous AC&HS  
 oxyCODONE IR (ROXICODONE) tablet 5 mg  5 mg Oral Q4H PRN  
 hydrOXYzine HCL (ATARAX) tablet 10 mg  10 mg Oral TID PRN  pantoprazole (PROTONIX) 40 mg in 0.9% sodium chloride 10 mL injection  40 mg IntraVENous DAILY  allopurinoL (ZYLOPRIM) tablet 100 mg  100 mg Oral BID  mycophenolate mofetil (CELLCEPT) capsule 250 mg  250 mg Oral ACB&D  
 zinc sulfate (ZINCATE) 220 (50) mg capsule 1 Cap  1 Cap Oral DAILY  ascorbic acid (vitamin C) (VITAMIN C) tablet 500 mg  500 mg Oral BID  cholecalciferol (VITAMIN D3) (1000 Units /25 mcg) tablet 2 Tab  2,000 Units Oral DAILY  albuterol (PROVENTIL HFA, VENTOLIN HFA, PROAIR HFA) inhaler 2 Puff  2 Puff Inhalation Q4H PRN  zinc oxide-cod liver oil (DESITIN) 40 % paste   Topical Q8H  
 acetaminophen (TYLENOL) tablet 650 mg  650 mg Oral Q6H PRN Or  
 acetaminophen (TYLENOL) suppository 650 mg  650 mg Rectal Q6H PRN  
 sodium chloride (NS) flush 5-40 mL  5-40 mL IntraVENous Q8H  
 sodium chloride (NS) flush 5-40 mL  5-40 mL IntraVENous PRN  polyethylene glycol (MIRALAX) packet 17 g  17 g Oral DAILY PRN  
 enoxaparin (LOVENOX) injection 30 mg  30 mg SubCUTAneous Q24H  
 ondansetron (ZOFRAN) injection 4 mg  4 mg IntraVENous Q6H PRN  
 amLODIPine (NORVASC) tablet 10 mg  10 mg Oral DAILY  aspirin delayed-release tablet 81 mg  81 mg Oral DAILY  melatonin tablet 3 mg  3 mg Oral QHS  metoprolol tartrate (LOPRESSOR) tablet 50 mg  50 mg Oral BID  labetaloL (NORMODYNE;TRANDATE) injection 20 mg  20 mg IntraVENous Q4H PRN Allergies/Social/Family History: Allergies Allergen Reactions  Benadryl [Diphenhydramine Hcl] Other (comments)  Fentanyl Other (comments)  Morphine Unknown (comments)  Versed [Midazolam] Other (comments) Social History Tobacco Use  Smoking status: Never Smoker  Smokeless tobacco: Never Used Substance Use Topics  Alcohol use: Never Frequency: Never Family History Problem Relation Age of Onset  Hypertension Mother Review of Systems: A comprehensive review of systems was negative except for that written in the HPI. Objective:  
Vital Signs: 
Visit Vitals BP (!) 130/53 (BP 1 Location: Right arm, BP Patient Position: At rest) Pulse 61 Temp (!) 94.1 °F (34.5 °C) Resp 22 Ht 5' 3\" (1.6 m) Wt 73.4 kg (161 lb 13.1 oz) SpO2 96% BMI 28.66 kg/m² O2 Flow Rate (L/min): 40 l/min O2 Device: Hi flow nasal cannula Temp (24hrs), Av °F (36.1 °C), Min:94.1 °F (34.5 °C), Max:98 °F (36.7 °C) Intake/Output:  
 
Intake/Output Summary (Last 24 hours) at 2020 1849 Last data filed at 2020 0400 Gross per 24 hour Intake  Output 350 ml Net -350 ml Physical Exam: 
General:  Agitated, confused, chronically ill looking Eyes:  Sclera anicteric. Pupils equally round and reactive to light. Mouth/Throat:  Mallampati 4, there is an area but seems to be an old incision no active bleeding no fresh blood Neck: Supple, lymph node appreciated Lungs:   Clear to auscultation bilaterally, good effort CV:  Regular rate and rhythm,no murmur, click, rub or gallop Abdomen:   Soft, non-tender. bowel sounds normal. non-distended, transplanted kidney palpable in left lower quadrant Extremities: No cyanosis or edema Skin: Skin color, texture, turgor normal. no acute rash or lesions Lymph nodes: Cervical and supraclavicular normal  
Musculoskeletal: No swelling or deformity Lines/Devices:  Intact, no erythema, drainage or tenderness LABS AND  DATA: Personally reviewed Recent Labs 20 
[phone] 20 
0350 WBC 9.4 8.6 HGB 10.8* 10.8* HCT 36.4 36.2  155 Recent Labs 20 
1544 20 
1112 20 
0458  20 
0350  146* 140   < > 139  
K 5.1 3.7 6.5*   < > 5.7*  
* 119* 114*   < > 109* CO2 20* 14* 14*   < > 22 BUN 57* 42* 49*   < > 56* CREA 1.76* 1.53* 1.72*   < > 1.69* * 235* 240*   < > 240* CA 9.2 5.9* 9.4   < > 9.4 MG 2.1  --   --   --   --   
PHOS  --   --  3.0  --  3.2  
 < > = values in this interval not displayed. Recent Labs 12/01/20 
9751 12/01/20 
0457 11/30/20 
0350 AP  --  144* 133* TP  --  7.0 6.6 ALB 2.5* 2.5* 2.5*  
GLOB  --  4.5* 4.1* Recent Labs 12/01/20 
1112 INR 1.2* PTP 12.6* APTT 40.0* Recent Labs 12/01/20 
1437 11/29/20 
2208 PHI 7.25* 7.31* PCO2I 48.2* 43.9 PO2I 48* 69* No results for input(s): CPK, CKMB, TROIQ, BNPP in the last 72 hours. Ventilator Settings: 
Mode Rate Tidal Volume Pressure FiO2 PEEP  
         100 % Peak airway pressure:     
Minute ventilation:     
 
 
MEDS: Reviewed RADIOLOGY: 
Xr Abd (ku) Result Date: 11/30/2020 IMPRESSION: No acute findings in the abdomen. Bilateral lower lobe airspace infiltrate suspicious for pneumonia in appropriate clinical setting. Xr Chest [facility] Result Date: 12/1/2020 IMPRESSION: Interval substantial worsening of bilateral consolidative opacifications, perihilar and lower lobe predominance on the right and mid and lower lung predominance on the left. Assessment:  
 
Hospital Problems  Date Reviewed: 11/24/2020 Codes Class Noted POA * (Principal) Acute respiratory distress syndrome (ARDS) due to COVID-19 virus [facility]) ICD-10-CM: U07.1, J80 
ICD-9-CM: 518.82, 079.89 Acute 12/1/2020 Yes Acute hypoxemic respiratory failure due to COVID-19 [facility]) ICD-10-CM: U07.1, J96.01 
ICD-9-CM: 518.81, 079.89, 799.02 Acute 11/30/2020 No  
   
 Kidney disease, chronic, stage IV (severe, EGFR 15-29 ml/min) ([facility]) ICD-10-CM: N18.4 ICD-9-CM: 585.4 End Stage 12/1/2020 Yes Severe dehydration ICD-10-CM: E86.0 ICD-9-CM: 276.51 Acute 12/1/2020 Yes Long-term use of aspirin therapy ICD-10-CM: Z79.82 ICD-9-CM: V58.66 Acute 12/1/2020 Yes Overview Signed 12/1/2020  6:40 PM by Elizabeth Hall MD  
  ASA 81 mg Daily Acute kidney injury superimposed on CKD ([address] ICD-10-CM: N17.9, N18.9 ICD-9-CM: 866.00, 585.9 Acute 11/30/2020 No  
   
 Respiratory tract infection due to COVID-19 virus ICD-10-CM: U07.1, J98.8 ICD-9-CM: 519.8, 079.89 Acute 11/24/2020 Yes EMMANUEL (obstructive sleep apnea) (Chronic) ICD-10-CM: G47.33 
ICD-9-CM: 327.23 End Stage 11/24/2020 Yes Steroid-induced hyperglycemia ICD-10-CM: R73.9, T38.0X5A 
ICD-9-CM: 790.29, E932.0 Acute 11/24/2020 Yes Post-transplant lymphoproliferative disorder [facility]) ICD-10-CM: T86.99, D47.Z1 
ICD-9-CM: 996.80, 238.77  11/25/2020 Yes Lymphoma ([address] ICD-10-CM: C85.90 ICD-9-CM: 202.80  11/24/2020 Yes Multidisciplinary Rounds Completed: Yes ABCDEF Bundle/Checklist 
Pain Medications: None Target RASS: 0 - Alert & Calm - Spontaneously pays attention to caregiver Sedation Medications: None CAM-ICU:  Negative Discussed Plan of Care (goals of care): Yes Addressed Code Status: Full Code CARDIOVASCULAR Cardiac Gtts: None SBP Goal of: > 90 mmHg MAP Goal of: > 65 mmHg Transfusion Trigger (Hgb): <7 g/dL RESPIRATORY Vent Goals:  
Head of bed > 30 degrees Aggressive bronchopulmonary hygiene DVT Prophylaxis (if no, list reason): SCD's or Sequential Compression Device SPO2 Goal: > 92% ANTIBIOTICS Antibiotics: 
Cefepime T/L/D Tubes: None Lines: Peripheral IV Drains: None SPECIAL EQUIPMENT None DISPOSITION Stay in ICU CRITICAL CARE CONSULTANT NOTE I provided a face-to-face bedside physician/patient encounter, greater than the usual and customary amount normally needed, due to the high complexity of medical decision-making required. I reviewed and interpreted patient data including clinical events, labs, images, vital signs, I/O's, and examined patient. I have actively participated in multi-disciplinary discussions (Emergency Medicine, ENT, Radiology, Clinical Pharmacist, and MICU nursing staff) regarding the case in formulating an optimal therapeutic plan, and effecting a management strategy for this patient. NOTE OF PERSONAL INVOLVEMENT IN CARE This patient has a moderate probability of imminent, clinically significant deterioration, which requires the highest level of preparedness to intervene urgently. I participated in the decision-making and personally managed, or directed the management of, a myriad of life and organ supporting interventions which required my frequent-interval clinical reassessments, in order to treat or prevent imminent deterioration. I personally spent 45 minutes of critical care time. This is time spent at this critically ill patient's bedside actively involved in patient care as well as the coordination of care and discussions with the patient's family. This does not include any procedural time which has been billed separately. Katya Muhammad MD, FACS Staff [place]/ [address] 
12/1/2020

## 2020-12-01 NOTE — PROGRESS NOTES
Problem: Airway Clearance - Ineffective Goal: *Patent airway Outcome: Progressing Towards Goal 
 Pt oxygen demands increasing significantly this shift, patient transferred to ICU. Problem: Non-Violent Restraints Goal: *Removal from restraints as soon as assessed to be safe Outcome: Progressing Towards Goal 
 Pt placed on bilateral wrist restraints for agitation, patient combative and pulling off oxygen. 0900: Pt potassium elevated in morning labs, labs hemolyzed. Redrawing BMP, Dr. Maggie Kincaid notified. 1230: Notified Dr. Maggie Kincaid of repeat BMP results, CO2 is 14 and calcium is 5.9. Per Dr. Maggie Kincaid, draw another repeat BMP. 
 
1400: Notified Dr. Maggie Kincaid patients oxygen demands are increasing. Pt started shift on 2 LNC, pt now on 6 LNC. Pt desaturates to 70-80s and recovers to 90s frequently. RT to place patient on midflow, orders received for CXR and ABG. 1450: Notified Dr. Maggie Kincaid patients oxygen is 48 on ABG results while on 12 L, patient placed on HFNC. 1530: Phone consent obtained from patient's daughters to transfuse convalescent plasma. 1620: Notified Dr. Maggie Kincaid patients rectal temperature is 94. 1. Pt placed on anisa hugger. RT to redraw another ABG to see how patient responding to HFNC. 1730: Intensivist at bedside assessing patient. 1800: Pt increasingly restless, keeps pulling off HFNC. Orders received for bilateral wrist restraints. Notified RT patient needs high flow settings increased. 1820: Pt increasingly agitated, screaming out, attempting to pull off oxygen and unable to calm down with therapeutic communication. Pt currently on HFNC 60 L 90% FiO2 and a non rebreather. Notified RT. Attempted to call report to ICU, RN unavailable and to call back. [phone][de-identified] Pt broke HF cannula and pulling off oxygen despite bilateral wrist restraints. Pt combative, requiring 2-3 RNs to keep oxygen on patient.   Pt placed on 15 L midflow and nonrebreather, notified RT multiple times that patient needs new high flow cannula. RT says there will be a delay due to shift change. Notified Dr. Marisol Barbour, no orders received for agitation as there is concern patient's potassium is 5.1 and for her respiratory status. 1900: TRANSFER - OUT REPORT: 
 
Verbal report given to Kaushik Vann RN(name) on [facility]  being transferred to ICU(unit) for urgent transfer Report consisted of patients Situation, Background, Assessment and  
Recommendations(SBAR). Information from the following report(s) SBAR, ED Summary, Intake/Output, MAR, Recent Results, Med Rec Status and Cardiac Rhythm NSR was reviewed with the receiving nurse. Lines:  
Peripheral IV 11/25/20 Left Antecubital (Active) Site Assessment Clean, dry, & intact 12/01/20 1557 Phlebitis Assessment 0 12/01/20 1557 Infiltration Assessment 0 12/01/20 1557 Dressing Status Clean, dry, & intact 12/01/20 1557 Dressing Type Transparent;Tape 12/01/20 1557 Hub Color/Line Status Pink; Infusing 12/01/20 1557 Action Taken Open ports on tubing capped 12/01/20 1557 Alcohol Cap Used Yes 12/01/20 1557 Peripheral IV 11/30/20 Anterior; Left Forearm (Active) Site Assessment Clean, dry, & intact 12/01/20 1557 Phlebitis Assessment 0 12/01/20 1557 Infiltration Assessment 0 12/01/20 1557 Dressing Status Clean, dry, & intact 12/01/20 1557 Dressing Type Transparent;Tape 12/01/20 1557 Hub Color/Line Status Blue; Infusing 12/01/20 1557 Action Taken Open ports on tubing capped 12/01/20 1557 Alcohol Cap Used Yes 12/01/20 1557 Opportunity for questions and clarification was provided. Patient transported with: 
Pt chart Pt belongings Oxygen (midflow and nonrebreather)

## 2020-12-01 NOTE — PROGRESS NOTES
Discussed with Dr Maryan Wadsworth. Recommend having IR place port once patient is cleared from [facility].

## 2020-12-01 NOTE — PROGRESS NOTES
Problem: Dysphagia (Adult) Goal: *Acute Goals and Plan of Care (Insert Text) Description: Speech Therapy Goals Initiated 11/25/2020; continue 12/1/2020 1. Patient will tolerate pureed diet/thin liquids without adverse effects within 7 days. 2. Patient will reinitiate baseline diet without adverse effects within 7 days. Outcome: Progressing Towards Goal 
  
SPEECH LANGUAGE PATHOLOGY DYSPHAGIA TREATMENT: WEEKLY REASSESSMENT Patient: Maria D Downing (82 y.o. female) Date: 12/1/2020 Diagnosis: Lymphoma ([facility] [address] iWl Orantes <principal problem not specified> Precautions:  (droplet, COVID+) ASSESSMENT: 
Patient seen with lunch tray and with good tolerance and appropriate rest breaks with self-feeding. Tolerated thin liquids via both cup and straw without s/s of aspiration. Discussed with patient solid trials, and she shook her head repeatedly no and reports desire to continue with purees due to fatigue and discomfort. Suspect her intake may continue to be limited and that she will likely continue with purees for some time. Patient's progression toward goals since last assessment: tolerating her pureed diet, no desire for upgrade at this time and still limited by odynophagia. PLAN: 
Goals have been updated based on progression since last assessment. Patient continues to benefit from skilled intervention to address the above impairments. Continue to follow the patient 2 times a week to address goals. Recommendations and Planned Interventions: 
--continue puree/thin liquid diet, general aspiration precautions. Will follow for consideration of solids once patient agreeable, though suspect she may remain on purees for a while based on odynophagia. Discharge Recommendations: To Be Determined SUBJECTIVE:  
Patient stated no! . When asked if she were interested in solids OBJECTIVE:  
Cognitive and Communication Status: 
Neurologic State: Alert, Confused Orientation Level: Oriented to person Cognition: Decreased attention/concentration, Decreased command following Perception: Appears intact Perseveration: No perseveration noted Safety/Judgement: Not assessed Dysphagia Treatment: 
Oral Assessment: P.O. Trials: 
Patient Position: upright in bed Vocal quality prior to P.O.: Breathy; Fatigue Consistency Presented: Puree; Thin liquid(lunch tray ) How Presented: Self-fed/presented;Cup/sip;Cup/gulp; Spoon;Straw;Successive swallows Bolus Acceptance: No impairment Bolus Formation/Control: No impairment Propulsion: Delayed (# of seconds) Oral Residue: None Initiation of Swallow: No impairment Laryngeal Elevation: Functional 
Aspiration Signs/Symptoms: None Pharyngeal Phase Characteristics: No impairment, issues, or problems Oral Phase Severity: Other (comment)(functional for purees) Pharyngeal Phase Severity : No impairment Exercises: 
Laryngeal Exercises: 
  
  
  
  
  
  
  
  
  
  
  
  
  
  
  
  
  
  
  
  
  
  
  
  
  
  
  
  
  
  
  
  
  
  
  
  
  
  
  
  
  
  
  
Pain: 
Pain Scale 1: Numeric (0 - 10) Pain Intensity 1: 3 Pain Location 1: Abdomen After treatment patient left in no apparent distress:  
Patient left in no apparent distress in bed, Call bell within reach, and Nursing notified COMMUNICATION/EDUCATION:  
The patients plan of care including recommendations, planned interventions, and recommended diet changes were discussed with: Registered nurse, MD. Jaclyn Amos M.CD. CCC-SLP Time Calculation: 16 mins

## 2020-12-01 NOTE — CONSULTS
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 Pt reportedly was getting port placed at outside hospital and noted to have some respiratory distress so procedure was aborted and patient was transferred to [facility] for positive COVID treatment. There is no urgent indication for port placement. Recommend waiting for resolution of COVID prior to port placement. Recommend following up with IR for outpatient port placement when patient's respiratory issues have resolved. Will signoff.

## 2020-12-01 NOTE — CONSULTS
ID Consult Note NAME:  Nicole Mann :   1950 MRN:   [id number] Date/Time:  2020 7:28 PM 
Subjective:  
REASON FOR CONSULT: Pneumonia Moreno Boy is a 69 y.o. with a history of chronic kidney disease and renal transplant. She also has a history of newly diagnosed diffuse B-cell lymphoma for which she got rituximab on this admission. She is not a good historian and it was very difficult to get a history from her. According to the admitting note, she was diagnosed with large cell lymphoma in 2020 after a tonsillar biopsy. She was getting a port inserted at the local hospital when she was found to be febrile. She was tested for Covid and came back positive. She tells me that she had been coughing for a while. She had associated diarrhea as well which she could not quantify. She said that there was some pleuritic chest pains. Because of her symptoms, she was transferred from her local facility to [facility].  Initial chest x-ray did not reveal any infiltrates. Since it was critical to start treatment for her lymphoma, she was given rituximab by Dr. Ruchi Aguirre. She was also diagnosed to have C. difficile and oral vancomycin has been started. She has not gotten remdesivir because of her kidney issues. She is currently on methylprednisolone. Her chest x-ray done yesterday shows new bilateral infiltrates. He has been started on cefepime, Zyvox and micafungin. Past Medical History:  
Diagnosis Date  Chronic kidney disease  Diabetes ([facility] [address] [facility]  Heart failure ([facility] [address] [facility]  Hypertension  Kidney disease  Sleep apnea  Throat cancer ([facility] [address] Past Surgical History:  
Procedure Laterality Date  HX CHOLECYSTECTOMY  HX RENAL TRANSPLANT Social History Tobacco Use  Smoking status: Never Smoker  Smokeless tobacco: Never Used Substance Use Topics  Alcohol use: Never Frequency: Never Family History Problem Relation Age of Onset  Hypertension Mother Allergies Allergen Reactions  Benadryl [Diphenhydramine Hcl] Other (comments)  Fentanyl Other (comments)  Morphine Unknown (comments)  Versed [Midazolam] Other (comments) Home Medications: 
Prior to Admission Medications Prescriptions Last Dose Informant Patient Reported? Taking? amLODIPine (NORVASC) 10 mg tablet   Yes No  
Sig: Take 10 mg by mouth daily. aspirin delayed-release 81 mg tablet   Yes No  
Sig: Take 81 mg by mouth daily. calcitRIOL (ROCALTROL) 0.25 mcg capsule   Yes No  
Sig: Take 0.25 mcg by mouth daily. cholecalciferol (VITAMIN D3) (2,000 UNITS /50 MCG) cap capsule   Yes No  
Sig: Take  by mouth two (2) times a day. furosemide (LASIX) 40 mg tablet   No No  
Sig: Take 2 Tabs by mouth Daily (before breakfast). furosemide (LASIX) 40 mg tablet   No No  
Sig: Take 1 Tab by mouth Daily (before dinner). gemfibroziL (LOPID) 600 mg tablet   Yes No  
Sig: Take 600 mg by mouth daily. hydrALAZINE (APRESOLINE) 50 mg tablet   Yes No  
Sig: Take 50 mg by mouth three (3) times daily. insulin lispro (HumaLOG U-100 Insulin) 100 unit/mL injection   Yes No  
Si Units by SubCUTAneous route Before breakfast, lunch, and dinner. isosorbide mononitrate ER (IMDUR) 60 mg CR tablet   Yes No  
Sig: Take 60 mg by mouth two (2) times a day. melatonin 3 mg tablet   Yes No  
Sig: Take 3 mg by mouth nightly. metoprolol tartrate (LOPRESSOR) 50 mg tablet   Yes No  
Sig: Take 50 mg by mouth two (2) times a day. mycophenolate mofetil (CellCept) 250 mg capsule   Yes Yes Sig: Take 250 mg by mouth two (2) times a day. omeprazole (PRILOSEC) 40 mg capsule   Yes No  
Sig: Take 40 mg by mouth daily. phenol throat spray (CHLORASEPTIC) 1.4 % spray   No No  
Sig: Take 1 Spray by mouth three (3) times daily as needed for Sore throat. predniSONE (DELTASONE) 2.5 mg tablet   Yes No  
Sig: Take 7.5 mg by mouth daily (with breakfast).   
sodium bicarbonate 650 mg tablet Yes No  
Sig: Take 1,300 mg by mouth four (4) times daily. tacrolimus (PROGRAF) 1 mg capsule   No No  
Sig: Take 1 Cap by mouth every twelve (12) hours for 30 days. zolpidem (Ambien) 5 mg tablet   Yes No  
Sig: Take 5 mg by mouth nightly. Facility-Administered Medications: None Hospital medications: 
Current Facility-Administered Medications Medication Dose Route Frequency  linezolid in dextrose 5% (ZYVOX) IVPB premix in D5W 600 mg  600 mg IntraVENous Q12H  
 sodium chloride (NS) flush 5-40 mL  5-40 mL IntraVENous Q8H  
 sodium chloride (NS) flush 5-40 mL  5-40 mL IntraVENous PRN  
 hydrALAZINE (APRESOLINE) tablet 100 mg  100 mg Oral TID  anidulafungin (ERAXIS) 200 mg in 0.9% sodium chloride 260 mL IVPB  200 mg IntraVENous ONCE  
 [START ON 12/1/2020] anidulafungin (ERAXIS) 100 mg in 0.9% sodium chloride 130 mL IVPB  100 mg IntraVENous Q24H  cloNIDine HCL (CATAPRES) tablet 0.1 mg  0.1 mg Oral BID  vancomycin (FIRVANQ) 50 mg/mL oral solution 125 mg  125 mg Oral Q6H  
 nystatin (MYCOSTATIN) 100,000 unit/mL oral suspension 500,000 Units  500,000 Units Oral QID  methylPREDNISolone (PF) (SOLU-MEDROL) injection 40 mg  40 mg IntraVENous Q12H  
 metroNIDAZOLE (FLAGYL) IVPB premix 500 mg  500 mg IntraVENous Q8H  
 cefepime (MAXIPIME) 2 g in 0.9% sodium chloride (MBP/ADV) 100 mL MBP  2 g IntraVENous Q24H  
 tacrolimus (PROGRAF) capsule 2 mg  2 mg Oral Q12H  
 glucose chewable tablet 16 g  4 Tab Oral PRN  
 glucagon (GLUCAGEN) injection 1 mg  1 mg IntraMUSCular PRN  
 dextrose 10% infusion 0-250 mL  0-250 mL IntraVENous PRN  
 insulin lispro (HUMALOG) injection   SubCUTAneous AC&HS  
 oxyCODONE IR (ROXICODONE) tablet 5 mg  5 mg Oral Q4H PRN  
 hydrOXYzine HCL (ATARAX) tablet 10 mg  10 mg Oral TID PRN  pantoprazole (PROTONIX) 40 mg in 0.9% sodium chloride 10 mL injection  40 mg IntraVENous DAILY  allopurinoL (ZYLOPRIM) tablet 100 mg  100 mg Oral BID  mycophenolate mofetil (CELLCEPT) capsule 250 mg  250 mg Oral ACB&D  
 zinc sulfate (ZINCATE) 220 (50) mg capsule 1 Cap  1 Cap Oral DAILY  ascorbic acid (vitamin C) (VITAMIN C) tablet 500 mg  500 mg Oral BID  cholecalciferol (VITAMIN D3) (1000 Units /25 mcg) tablet 2 Tab  2,000 Units Oral DAILY  albuterol (PROVENTIL HFA, VENTOLIN HFA, PROAIR HFA) inhaler 2 Puff  2 Puff Inhalation Q4H PRN  zinc oxide-cod liver oil (DESITIN) 40 % paste   Topical Q8H  
 acetaminophen (TYLENOL) tablet 650 mg  650 mg Oral Q6H PRN Or  
 acetaminophen (TYLENOL) suppository 650 mg  650 mg Rectal Q6H PRN  
 sodium chloride (NS) flush 5-40 mL  5-40 mL IntraVENous Q8H  
 sodium chloride (NS) flush 5-40 mL  5-40 mL IntraVENous PRN  polyethylene glycol (MIRALAX) packet 17 g  17 g Oral DAILY PRN  
 enoxaparin (LOVENOX) injection 30 mg  30 mg SubCUTAneous Q24H  
 0.9% sodium chloride infusion  50 mL/hr IntraVENous CONTINUOUS  
 ondansetron (ZOFRAN) injection 4 mg  4 mg IntraVENous Q6H PRN  
 amLODIPine (NORVASC) tablet 10 mg  10 mg Oral DAILY  aspirin delayed-release tablet 81 mg  81 mg Oral DAILY  melatonin tablet 3 mg  3 mg Oral QHS  metoprolol tartrate (LOPRESSOR) tablet 50 mg  50 mg Oral BID  labetaloL (NORMODYNE;TRANDATE) injection 20 mg  20 mg IntraVENous Q4H PRN  
 
REVIEW OF SYSTEMS:   
 
 
Const:   negative weight loss Eyes:   negative diplopia or visual changes, negative eye pain ENT:   negative coryza Resp:   negative  hemoptysis Cards:  negative for  palpitations, lower extremity edema :  negative for frequency, dysuria and hematuria GI:   Negative for hematemesis and hematochezia Skin:   negative for rash Heme:   negative for easy bruising and gum/nose bleeding MS:  negative for myalgias, arthralgias Neurolo:  negative for headaches, dizziness Psych:  negative for hallucinations Objective: VITALS:   
Visit Vitals /75 (BP 1 Location: Right arm, BP Patient Position: At rest) Pulse 67 Temp 97.7 °F (36.5 °C) Resp 12 Ht 5' 3\" (1.6 m) Wt 73.5 kg (162 lb 0.6 oz) SpO2 95% BMI 28.70 kg/m² Temp (24hrs), Av.6 °F (36.4 °C), Min:97 °F (36.1 °C), Max:98 °F (36.7 °C) PHYSICAL EXAM:  
General:    Alert appears stated age. Head:   Normocephalic, without obvious abnormality, atraumatic. Eyes:   Conjunctivae clear, anicteric sclerae. Nose:  Nares normal.  
Throat:    Lips and tongue normal.   
Neck:  Supple, symmetrical 
  no carotid bruit and no JVD. :    No CVA tenderness, no knox catheter Lungs:   Crackles bilaterally Heart:   Regular rate and rhythm,  no murmur, rub or gallop. Abdomen:   Soft, non-tender,not distended. Bowel sounds normal.  
Extremities: Knees, ankles, wrists, elbows are not warm and not tender. No pedal edema Skin:     No rashes or lesions. Not Jaundiced Lymph: Cervical normal 
Neurologic:  no facial asymmetry, tongue midline muscle strength equal 
 
LAB DATA REVIEWED:   
Recent Results (from the past 48 hour(s)) GLUCOSE, POC Collection Time: 20  9:34 PM  
Result Value Ref Range Glucose (POC) 183 (H) 65 - 100 mg/dL Performed by Genny Jackson GLUCOSE, POC Collection Time: 20  6:35 AM  
Result Value Ref Range Glucose (POC) 177 (H) 65 - 100 mg/dL Performed by Genny Jackson D DIMER Collection Time: 20  8:50 AM  
Result Value Ref Range D-dimer 1.23 (H) 0.00 - 0.65 mg/L FEU  
CBC WITH AUTOMATED DIFF Collection Time: 20  8:50 AM  
Result Value Ref Range WBC 6.2 3.6 - 11.0 K/uL  
 RBC 4.97 3.80 - 5.20 M/uL  
 HGB 13.5 11.5 - 16.0 g/dL HCT 46.0 35.0 - 47.0 % MCV 92.6 80.0 - 99.0 FL  
 MCH 27.2 26.0 - 34.0 PG  
 MCHC 29.3 (L) 30.0 - 36.5 g/dL  
 RDW 16.8 (H) 11.5 - 14.5 % PLATELET 052 672 - 622 K/uL MPV 9.9 8.9 - 12.9 FL  
 NRBC 0.0 0  WBC ABSOLUTE NRBC 0.00 0.00 - 0.01 K/uL NEUTROPHILS 93 (H) 32 - 75 % LYMPHOCYTES 3 (L) 12 - 49 % MONOCYTES 4 (L) 5 - 13 % EOSINOPHILS 0 0 - 7 % BASOPHILS 0 0 - 1 % IMMATURE GRANULOCYTES 0 0.0 - 0.5 % ABS. NEUTROPHILS 5.8 1.8 - 8.0 K/UL  
 ABS. LYMPHOCYTES 0.2 (L) 0.8 - 3.5 K/UL  
 ABS. MONOCYTES 0.2 0.0 - 1.0 K/UL  
 ABS. EOSINOPHILS 0.0 0.0 - 0.4 K/UL  
 ABS. BASOPHILS 0.0 0.0 - 0.1 K/UL  
 ABS. IMM. GRANS. 0.0 0.00 - 0.04 K/UL  
 DF SMEAR SCANNED    
 RBC COMMENTS ANISOCYTOSIS 1+ RENAL FUNCTION PANEL Collection Time: 11/29/20  8:50 AM  
Result Value Ref Range Sodium 138 136 - 145 mmol/L Potassium 5.0 3.5 - 5.1 mmol/L Chloride 108 97 - 108 mmol/L  
 CO2 25 21 - 32 mmol/L Anion gap 5 5 - 15 mmol/L Glucose 209 (H) 65 - 100 mg/dL BUN 52 (H) 6 - 20 MG/DL Creatinine 1.78 (H) 0.55 - 1.02 MG/DL  
 BUN/Creatinine ratio 29 (H) 12 - 20 GFR est AA 34 (L) >60 ml/min/1.73m2 GFR est non-AA 28 (L) >60 ml/min/1.73m2 Calcium 9.4 8.5 - 10.1 MG/DL Phosphorus 3.0 2.6 - 4.7 MG/DL Albumin 2.7 (L) 3.5 - 5.0 g/dL GLUCOSE, POC Collection Time: 11/29/20 11:49 AM  
Result Value Ref Range Glucose (POC) 142 (H) 65 - 100 mg/dL Performed by Claudene Quill GLUCOSE, POC Collection Time: 11/29/20  4:24 PM  
Result Value Ref Range Glucose (POC) 299 (H) 65 - 100 mg/dL Performed by Claudene QuAnswerology GLUCOSE, POC Collection Time: 11/29/20  4:27 PM  
Result Value Ref Range Glucose (POC) 255 (H) 65 - 100 mg/dL Performed by Claudene Quill POC EG7 Collection Time: 11/29/20  5:40 PM  
Result Value Ref Range Calcium, ionized (POC) 1.33 (H) 1.12 - 1.32 mmol/L  
 pH (POC) 7.27 (L) 7.35 - 7.45    
 pCO2 (POC) 49.0 (H) 35.0 - 45.0 MMHG  
 pO2 (POC) 82 80 - 100 MMHG  
 HCO3 (POC) 22.3 22 - 26 MMOL/L Base deficit (POC) 5 mmol/L  
 sO2 (POC) 94 92 - 97 % Site LEFT RADIAL Device: NASAL CANNULA Flow rate (POC) 3.5 L/M Allens test (POC) YES Specimen type (POC) ARTERIAL    
LACTIC ACID Collection Time: 11/29/20  6:07 PM  
Result Value Ref Range  Lactic acid 1.0 0.4 - 2. 0 MMOL/L  
GLUCOSE, POC Collection Time: 20  9:05 PM  
Result Value Ref Range Glucose (POC) 244 (H) 65 - 100 mg/dL Performed by Rosita Martinez POC EG7 Collection Time: 20 10:08 PM  
Result Value Ref Range Calcium, ionized (POC) 1.40 (H) 1.12 - 1.32 mmol/L  
 pH (POC) 7.31 (L) 7.35 - 7.45    
 pCO2 (POC) 43.9 35.0 - 45.0 MMHG  
 pO2 (POC) 69 (L) 80 - 100 MMHG  
 HCO3 (POC) 22.2 22 - 26 MMOL/L Base deficit (POC) 4 mmol/L  
 sO2 (POC) 92 92 - 97 % Site RIGHT BRACHIAL Device: NASAL CANNULA Flow rate (POC) 4 L/M Allens test (POC) N/A Specimen type (POC) ARTERIAL    
PROCALCITONIN Collection Time: 20  3:50 AM  
Result Value Ref Range Procalcitonin 0.50 ng/mL D DIMER Collection Time: 20  3:50 AM  
Result Value Ref Range D-dimer 0.89 (H) 0.00 - 0.65 mg/L FEU  
TACROLIMUS, WHOLE BLOOD Collection Time: 20  3:50 AM  
Result Value Ref Range Tacrolimus 5.2 Ther Rn.0-15.0 ng/mL CBC WITH AUTOMATED DIFF Collection Time: 20  3:50 AM  
Result Value Ref Range WBC 8.6 3.6 - 11.0 K/uL  
 RBC 3.95 3.80 - 5.20 M/uL  
 HGB 10.8 (L) 11.5 - 16.0 g/dL HCT 36.2 35.0 - 47.0 % MCV 91.6 80.0 - 99.0 FL  
 MCH 27.3 26.0 - 34.0 PG  
 MCHC 29.8 (L) 30.0 - 36.5 g/dL  
 RDW 16.6 (H) 11.5 - 14.5 % PLATELET 547 552 - 094 K/uL MPV 10.4 8.9 - 12.9 FL  
 NRBC 0.0 0  WBC ABSOLUTE NRBC 0.00 0.00 - 0.01 K/uL NEUTROPHILS 96 (H) 32 - 75 % LYMPHOCYTES 1 (L) 12 - 49 % MONOCYTES 2 (L) 5 - 13 % EOSINOPHILS 0 0 - 7 % BASOPHILS 0 0 - 1 % IMMATURE GRANULOCYTES 1 (H) 0.0 - 0.5 % ABS. NEUTROPHILS 8.2 (H) 1.8 - 8.0 K/UL  
 ABS. LYMPHOCYTES 0.1 (L) 0.8 - 3.5 K/UL  
 ABS. MONOCYTES 0.2 0.0 - 1.0 K/UL  
 ABS. EOSINOPHILS 0.0 0.0 - 0.4 K/UL  
 ABS. BASOPHILS 0.0 0.0 - 0.1 K/UL  
 ABS. IMM. GRANS. 0.1 (H) 0.00 - 0.04 K/UL  
 DF SMEAR SCANNED    
 RBC COMMENTS ANISOCYTOSIS 2+ 
    
 RBC COMMENTS NATE CELLS 
PRESENT 
 METABOLIC PANEL, COMPREHENSIVE Collection Time: 11/30/20  3:50 AM  
Result Value Ref Range Sodium 139 136 - 145 mmol/L Potassium 5.7 (H) 3.5 - 5.1 mmol/L Chloride 109 (H) 97 - 108 mmol/L  
 CO2 22 21 - 32 mmol/L Anion gap 8 5 - 15 mmol/L Glucose 240 (H) 65 - 100 mg/dL BUN 56 (H) 6 - 20 MG/DL Creatinine 1.69 (H) 0.55 - 1.02 MG/DL  
 BUN/Creatinine ratio 33 (H) 12 - 20 GFR est AA 36 (L) >60 ml/min/1.73m2 GFR est non-AA 30 (L) >60 ml/min/1.73m2 Calcium 9.4 8.5 - 10.1 MG/DL Bilirubin, total 0.4 0.2 - 1.0 MG/DL  
 ALT (SGPT) 19 12 - 78 U/L  
 AST (SGOT) 31 15 - 37 U/L Alk. phosphatase 133 (H) 45 - 117 U/L Protein, total 6.6 6.4 - 8.2 g/dL Albumin 2.5 (L) 3.5 - 5.0 g/dL Globulin 4.1 (H) 2.0 - 4.0 g/dL A-G Ratio 0.6 (L) 1.1 - 2.2 PHOSPHORUS Collection Time: 11/30/20  3:50 AM  
Result Value Ref Range Phosphorus 3.2 2.6 - 4.7 MG/DL  
GLUCOSE, POC Collection Time: 11/30/20  8:33 AM  
Result Value Ref Range Glucose (POC) 231 (H) 65 - 100 mg/dL Performed by [employer] GLUCOSE, POC Collection Time: 11/30/20  1:00 PM  
Result Value Ref Range Glucose (POC) 221 (H) 65 - 100 mg/dL Performed by [employer] GLUCOSE, POC Collection Time: 11/30/20  5:08 PM  
Result Value Ref Range Glucose (POC) 234 (H) 65 - 100 mg/dL Performed by Linda Roger IMPRESSION #1 bilateral infiltrates -Covid versus bacterial pneumonia versus other #2 [id number] 
 
#3 c. diff #4 chronic kidney disease status post renal transplant #5 diabetes #6 heart failure #7 hypertension PLAN 
 
1. I will add on doxycycline for atypical coverage. I will order Legionella, mycoplasma and chlamydia serologies. Continue Zyvox, cefepime 2. Continue oral vancomycin and IV Flagyl for C. Difficile 3. she cannot receive remdesivir because of her kidney function. She is already on steroids ___________________________________________________ ID: Shazia Castrejon MD

## 2020-12-01 NOTE — CONSULTS
Palliative Medicine Consult [place]: [phone]) Patient Name: Naga Anaya YOB: 1950 Date of Initial Consult: 12/1/2020 Reason for Consult: [address] Discussion Requesting Provider:Dr. Jatinder Howard Primary Care Physician: Kayleen Rivers MD 
 
 SUMMARY:  
Naga Anaya is a 69 y.o. with a past history of Renal Transplant on chronic Immunosuppressant Therapy, Lymphoma (dx 10/2020, had not yet started tx), DM, CKD IV, HTN, HLD, Sleep Apnea, who was admitted on 11/24/2020 from home with a diagnosis of COVID19 PNA and Peritonsillar mass with some element of airway compromise. Current medical issues leading to Palliative Medicine involvement include: GOC Discussion with 69year old woman who has multiple co morbidities and is currently hospitalized and in respiratory failure secondary to COVID PNA . Patient presented to ER with cc of chills, sore throat, cough with +bloody secretions, congestion,SOB and difficulty eating due to sore throat. Patient also reported diarrhea. In ER patient alert and oriented, she was hypertensive. Patient hypertensive. Labs revealed hyperkalemia. CXR + persistent prehilar and Lower lobe airspace opacities, infectious vs edema, left trace pleural effusion. CT neck +increased oropharyngeal mass with increased airway compromise. CT chest +6mm ERICKA nodule, Increased lymphadenopathy. COVID19 PUI Course of Hospitalization: Covid 19 test resulted positive, started on vitamin C and zinc, as well as steroids. Patient not a candidate for remdesivir due to kidney disease, however convalescent plasma at ordered. Cidff +, started on vanc. Nephrologist following, potassium continuing to rise despite interventions, they also recommend continuing immunosuppressant. Oncology following, started on rituximab x1, stated cytotoxic chemo contraindicated with active Covid.   Speech pathologist following, report patient with intact oropharyngeal swallow however due to fatigue and shortness of breath, is at risk for aspiration, ordered purées and thins. On 12/1 patient's respiratory status seems to be declining as evidenced by increased oxygen needs, was on baseline 3 L, then increased to high flow. CXR revealed substantial interval worsening of bilateral consolidative opacities. PALLIATIVE DIAGNOSES:  
1. COVID19 Infection 2. Advanced care planning discussion 3. DNR Discussion 4. Goals of care discussion 5. Altered mental status, unspecified 6. Weakness, generalized 7. Poor nutrition 8. Hypoalbuminemia 9. Shortness of breath PLAN:  
1. Prior to visit completed extensive chart review, including review of documentation, vitals, Mars and results of labs and other diagnostics. I also spoke with patients nurse Lynn Renee prior to visit. 2. After dressing and the appropriate PPE, I went into visit patient, the respiratory therapist was also present. Patient appears very ill, weak and frail, she was able to answer yes/no questions intermittently, otherwise most of her speech was unintelligible to me, possibly due to not having her dentures in, however I also suspect that she is confused, not consistently following simple commands. 3. Advanced care planning discussion-no AMD in the EMR. Patient is ,  Damián Gant, [phone], is his legal NOK. Patient also has 2 adult daughters, [place], [phone] and daughter Edu Lopez, [phone]. Daughters would serve as secondary healthcare decision-makers. 4. ACP portion of EMR updated 5. I attempted to call the patient's , no answer, however I received a message that the mailbox was full. I then called patient's daughter [place] who asked that we conference call in other daughter Annabel Smith, which I did. Annabel Smith also had another female relative on call as well. 6. I introduced myself and role of palliative medicine.   Attempted to assess their understanding of hospitalization. We spent time talking discussing COVID-19 PNA and that patient is at high risk for for intubation. I spent time answering their questions regarding  BiPAP versus intubation/ventilator support. I explained to them that she was at high risk for being intubated. 7. DNR discussion-patient continues to have full CODE STATUS. We discussed CPR, I recommended that patient have a DNR CODE STATUS, but explained that medical team needed to speak with the patient's  who is the legal healthcare decision-maker. 8. Family stated that they were to go over to their dad's house and call me back so that we could discuss goals of care and CODE STATUS with him. Unfortunately they did not call back by the time I left the unit. 9. Altered mental status, unspecified-etiology includes fatigue and possible metabolic encephalopathy. 10. Poor nutrition-hypoalbuminemia with albumin 2.5. Apparently patient has told staff she is hungry, however due to mass and painful swallowing, she has had poor intake. 11. *Later in the evening, as I was beginning to complete documentation, patient who had just transferred to the ICU, coded, CPR initiated and patient intubated. I spoke with NP intensivist Prabhjot Nevarez, who contacted the patient's family, and family agreed to comfort focused care. I placed comfort orders to minimize any potential distressing symptoms, including pain/shortness of breath/anxiety/secretions/fever 12. Palliative team will chart check tomorrow 12/2, if patient is still with us, may see if family would like to consult hospice. 13. Initial consult note routed to primary continuity provider and/or primary health care team members 14. Communicated plan of care with: Palliative IDT, [address] Team, Amisha Dinh, intensivist Prabhjot Nevarez, palliative  Elda Stephens. GOALS OF CARE / TREATMENT PREFERENCES:  
 
GOALS OF CARE: 
Patient/Health Care Proxy Stated Goals: Cure TREATMENT PREFERENCES:  
Code Status: DNR Advance Care Planning: 
[x] The [facility] Interdisciplinary Team has updated the ACP Navigator with [place] and Patient Capacity Primary Decision Maker (Active): Carly Molina - Spouse - [phone] Secondary Decision Maker: Aniyah Castaneda - Daughter - [phone] Secondary Decision Maker: Gaye Martinez - Daughter - [phone] Advance Care Planning 12/1/2020 Patient's Healthcare Decision Maker is: Verbal statement (Legal Next of Kin remains as decision maker) Confirm Advance Directive None Patient Would Like to Complete Advance Directive Unable Medical Interventions: Full interventions Other Instructions Other: As far as possible, the palliative care team has discussed with patient / health care proxy about goals of care / treatment preferences for patient. HISTORY:  
 
History obtained from: medical records/family/attending and unit Nurse CHIEF COMPLAINT: N/A 
 
HPI/SUBJECTIVE: The patient is:  
[] Verbal and participatory [x] Non-participatory due to:  
 
Very weak, would answer some yes. No questions but I was unable to understand anything else, Im sure that patient not having dentures in may have contributed to unintelligible speech. Clinical Pain Assessment (nonverbal scale for severity on nonverbal patients):  
Clinical Pain Assessment Severity: 2 Location: abdominal pain Character: patients speech unintelligible, unable to assess Duration: patients speech unintelligible, unable to assess Effect: patients speech unintelligible, unable to assess Factors: patients speech unintelligible, unable to assess Frequency: patients speech unintelligible, unable to assess Activity (Movement): Lying quietly, normal position Duration: for how long has pt been experiencing pain (e.g., 2 days, 1 month, years) Frequency: how often pain is an issue (e.g., several times per day, once every few days, constant) FUNCTIONAL ASSESSMENT:  
 
Palliative Performance Scale (PPS): PPS: 20 
 
 
 PSYCHOSOCIAL/SPIRITUAL SCREENING:  
 
Palliative IDT has assessed this patient for cultural preferences / practices and a referral made as appropriate to needs (Cultural Services, Patient Advocacy, Ethics, etc.) Any spiritual / Congregational concerns:unable to assess 
[] Yes /  [] No 
 
Caregiver Burnout: spoke to caregivers over phone 
[] Yes /  [x] No /  [] No Caregiver Present Anticipatory grief assessment: unable to assess 
[] Normal  / [] Maladaptive ESAS Anxiety: Anxiety: 0 
 
ESAS Depression:    
 
 
 REVIEW OF SYSTEMS:  
 
Positive and pertinent negative findings in ROS are noted above in HPI. The following systems were [x] reviewed / [] unable to be reviewed as noted in HPI Other findings are noted below. Systems: constitutional, ears/nose/mouth/throat, respiratory, gastrointestinal, genitourinary, musculoskeletal, integumentary, neurologic, psychiatric, endocrine. Positive findings noted below. Modified ESAS Completed by: provider Fatigue: 10 Drowsiness: 8 Pain: 2 Anxiety: 0 Nausea: 0 Anorexia: 8 Dyspnea: 6 Constipation: No  
  Stool Occurrence(s): 1 PHYSICAL EXAM:  
 
From RN flowsheet: 
Wt Readings from Last 3 Encounters:  
12/01/20 161 lb 13.1 oz (73.4 kg) 11/24/20 146 lb (66.2 kg)  
11/16/20 148 lb 12.3 oz (67.5 kg) Blood pressure 125/82, pulse 63, temperature 98.2 °F (36.8 °C), resp. rate 20, height 5' 3\" (1.6 m), weight 161 lb 13.1 oz (73.4 kg), SpO2 100 %. Pain Scale 1: Adult Nonverbal Pain Scale Pain Intensity 1: 0 Pain Onset 1: Acute Pain Location 1: Abdomen Pain Orientation 1: Anterior Pain Description 1: Aching Pain Intervention(s) 1: Rest, MD notified (comment) Last bowel movement, if known:  
 
Constitutional: elderly, fatigued, weak, + WOB Eyes: pupils equal, anicteric ENMT: no nasal discharge, dry mucous membranes Cardiovascular: regular rhythm, distal pulses intact Respiratory: breathing labored, +use of accessory muscles, on high flow, +abnormal breath sounds Gastrointestinal: soft +tender, +bowel sounds Musculoskeletal: no deformity, no tenderness to palpation Skin: warm, dry Neurologic: Difficult to understand patient,  minimal command following, weak, moving all extremities Psychiatric:restricted affect, unable to assess Other: 
 
 
 HISTORY:  
 
Principal Problem: 
  Acute respiratory distress syndrome (ARDS) due to COVID-19 virus ([facility] (12/1/2020) Active Problems: 
  Acute hypoxemic respiratory failure due to COVID-19 [facility]) (11/30/2020) Acute kidney injury superimposed on CKD ([facility] (11/30/2020) Kidney disease, chronic, stage IV (severe, EGFR 15-29 ml/min) (HCC) (12/1/2020) Respiratory tract infection due to COVID-19 virus (11/24/2020) EMMANUEL (obstructive sleep apnea) (11/24/2020) Severe dehydration (12/1/2020) Steroid-induced hyperglycemia (11/24/2020) Lymphoma ([facility] (11/24/2020) Post-transplant lymphoproliferative disorder ([facility] (11/25/2020) Long-term use of aspirin therapy (12/1/2020) Overview: ASA 81 mg Daily Past Medical History:  
Diagnosis Date  Chronic kidney disease  Diabetes ([facility]  Heart failure ([facility]  Hypertension  Kidney disease  Long-term use of aspirin therapy 12/1/2020 ASA 81 mg Daily  Sleep apnea  Throat cancer ([facility] Past Surgical History:  
Procedure Laterality Date  HX CHOLECYSTECTOMY  HX RENAL TRANSPLANT Family History Problem Relation Age of Onset  Hypertension Mother History reviewed, no pertinent family history. Social History Tobacco Use  Smoking status: Never Smoker  Smokeless tobacco: Never Used Substance Use Topics  Alcohol use: Never Frequency: Never Allergies Allergen Reactions  Benadryl [Diphenhydramine Hcl] Other (comments)  Fentanyl Other (comments)  Morphine Unknown (comments)  Versed [Midazolam] Other (comments) Current Facility-Administered Medications Medication Dose Route Frequency  dexmedeTOMidine in 0.9 % NaCl (PRECEDEX) 400 mcg/100 mL (4 mcg/mL) infusion soln  0.1-1.5 mcg/kg/hr IntraVENous TITRATE  
 NOREPINephrine (LEVOPHED) 8 mg in 5% dextrose 250mL (32 mcg/mL) infusion  0.5-16 mcg/min IntraVENous TITRATE  PHENYLephrine (YANICK-SYNEPHRINE) 30 mg in 0.9% sodium chloride 250 mL infusion   mcg/min IntraVENous TITRATE  fentaNYL (PF) 1,500 mcg/30 mL (50 mcg/mL) infusion  0-200 mcg/hr IntraVENous TITRATE  propofol (DIPRIVAN) 10 mg/mL infusion  0-50 mcg/kg/min IntraVENous TITRATE  LORazepam (ATIVAN) injection 1 mg  1 mg IntraVENous Q15MIN PRN  
 glycopyrrolate (ROBINUL) injection 0.2 mg  0.2 mg IntraVENous Q4H PRN  
 scopolamine (TRANSDERM-SCOP) 1 mg over 3 days 1 Patch  1 Patch TransDERmal Q72H PRN  
 HYDROmorphone (PF) (DILAUDID) injection 0.5 mg  0.5 mg IntraVENous Q15MIN PRN  
 hydrOXYzine HCL (ATARAX) tablet 10 mg  10 mg Oral TID PRN  
 albuterol (PROVENTIL HFA, VENTOLIN HFA, PROAIR HFA) inhaler 2 Puff  2 Puff Inhalation Q4H PRN  
 acetaminophen (TYLENOL) tablet 650 mg  650 mg Oral Q6H PRN Or  
 acetaminophen (TYLENOL) suppository 650 mg  650 mg Rectal Q6H PRN  
 sodium chloride (NS) flush 5-40 mL  5-40 mL IntraVENous PRN  polyethylene glycol (MIRALAX) packet 17 g  17 g Oral DAILY PRN  
 ondansetron (ZOFRAN) injection 4 mg  4 mg IntraVENous Q6H PRN  
 labetaloL (NORMODYNE;TRANDATE) injection 20 mg  20 mg IntraVENous Q4H PRN  
 
 
 
 LAB AND IMAGING FINDINGS:  
 
Lab Results Component Value Date/Time WBC 9.4 12/01/2020 03:57 AM  
 HGB 10.8 (L) 12/01/2020 03:57 AM  
 PLATELET 822 11/70/3187 03:57 AM  
 
Lab Results Component Value Date/Time  Sodium 137 12/01/2020 03:44 PM  
 Potassium 5.1 12/01/2020 03:44 PM  
 Chloride 110 (H) 12/01/2020 03:44 PM  
 CO2 20 (L) 12/01/2020 03:44 PM  
 BUN 57 (H) 12/01/2020 03:44 PM  
 Creatinine 1.76 (H) 12/01/2020 03:44 PM  
 Calcium 9.2 12/01/2020 03:44 PM  
 Magnesium 2.1 12/01/2020 03:44 PM  
 Phosphorus 3.0 12/01/2020 04:58 AM  
  
Lab Results Component Value Date/Time Alk. phosphatase 144 (H) 12/01/2020 04:57 AM  
 Protein, total 7.0 12/01/2020 04:57 AM  
 Albumin 2.5 (L) 12/01/2020 04:58 AM  
 Globulin 4.5 (H) 12/01/2020 04:57 AM  
 
Lab Results Component Value Date/Time INR 1.2 (H) 12/01/2020 11:12 AM  
 Prothrombin time 12.6 (H) 12/01/2020 11:12 AM  
 aPTT 40.0 (H) 12/01/2020 11:12 AM  
 aPTT 40.7 (H) 11/24/2020 10:25 AM  
  
Lab Results Component Value Date/Time Iron 28 (L) 11/15/2020 07:12 AM  
 TIBC 206 (L) 11/15/2020 07:12 AM  
 Iron % saturation 14 (L) 11/15/2020 07:12 AM  
 Ferritin 1,158 (H) 12/01/2020 04:57 AM  
  
No results found for: PH, PCO2, PO2 No components found for: [place] No results found for: CPK, CKMB Total time: 100 min Counseling / coordination time, spent as noted above: 80 min 
> 50% counseling / coordination?: yes Prolonged service was provided for  [x]30 min   []75 min in face to face time in the presence of the patient, spent as noted above. Time Start:  
Time End:  
Note: this can only be billed with 36682 (initial) or 90223 (follow up). If multiple start / stop times, list each separately.

## 2020-12-01 NOTE — PROGRESS NOTES
Discussed with patient's daughter Willy Ramirez about worsening respirataory status today. She told me that they have discussed and agreed for convalescent plasma transfusion. Discussed about over all clinical picture(CKD,immunosuppressed,lymphoma,infection, COVID 19)  again,patient is critically ill- discussed she may need intubation/mech ventilator if she continues to worsen and what resuscitation means in case of cardiac arrest- Ms. Huang told me \" do everything to keep my mom alive\". Patient does not have an advance care directive, she has 2 daughters. Per radhika,their dad is old and unable to [facility].

## 2020-12-01 NOTE — PROGRESS NOTES
Patient Eligibility Patient meets one or more of the following eligibility criteria used for the [facility] Expanded Access Treatment Protocol. These criteria include: 
 
Laboratory confirmed [id number] Severe or immediately life-threatening COVID-19, for example, Severe disease is defined as one or more of the following: 
· shortness of breath (dyspnea), 
· respiratory frequency ? 30/min, 
· blood oxygen saturation ? 93%, 
· partial pressure of arterial oxygen to fraction of inspired oxygen ratio < 300, 
· lung infiltrates > 50% within 24 to 48 hours Life-threatening disease is defined as one or more of the following: 
· respiratory failure, 
· septic shock, 
· multiple organ dysfunction or failure Informed consent provided by the patient or healthcare proxy.

## 2020-12-01 NOTE — PROGRESS NOTES
Hematology-Oncology Progress Note Jesse Dickey 1950 
[id number] 
12/1/2020 Subjective:  
 
Denies abdominal pain today. No diarrhea. \"I feel okay\" Allergies: Benadryl [diphenhydramine hcl]; Fentanyl; Morphine; and Versed [midazolam] Current Facility-Administered Medications Medication Dose Route Frequency Provider Last Rate Last Dose  tacrolimus (PROGRAF) capsule 1.5 mg  1.5 mg Oral Q12H Reginaldo Boyer MD      
 sodium bicarbonate (8.4%) 150 mEq in sterile water 1,000 mL infusion   IntraVENous CONTINUOUS Reginaldo Boyer MD      
 linezolid in dextrose 5% (ZYVOX) IVPB premix in D5W 600 mg  600 mg IntraVENous Q12H Tiara Llanos MD   600 mg at 11/30/20 2317  sodium chloride (NS) flush 5-40 mL  5-40 mL IntraVENous Q8H Tiara Llanos MD   10 mL at 12/01/20 8662  sodium chloride (NS) flush 5-40 mL  5-40 mL IntraVENous PRN Tiara Llanos MD      
 hydrALAZINE (APRESOLINE) tablet 100 mg  100 mg Oral TID Tiara Llanos MD   100 mg at 12/01/20 0901  
 anidulafungin (ERAXIS) 100 mg in 0.9% sodium chloride 130 mL IVPB  100 mg IntraVENous Q24H Tiara Llanos MD      
 cloNIDine HCL (CATAPRES) tablet 0.1 mg  0.1 mg Oral BID Tiara Llanos MD   0.1 mg at 12/01/20 0901  
 doxycycline (VIBRAMYCIN) 100 mg in 0.9% sodium chloride (MBP/ADV) 100 mL MBP  100 mg IntraVENous Q12H Hawa Matthew  mL/hr at 12/01/20 0904 100 mg at 12/01/20 0907  vancomycin (FIRVANQ) 50 mg/mL oral solution 125 mg  125 mg Oral Q6H Tiara Llanos MD   125 mg at 12/01/20 0608  nystatin (MYCOSTATIN) 100,000 unit/mL oral suspension 500,000 Units  500,000 Units Oral QID Tiara Llanos MD   500,000 Units at 12/01/20 7705  methylPREDNISolone (PF) (SOLU-MEDROL) injection 40 mg  40 mg IntraVENous Q12H Tiara Llanos MD   40 mg at 12/01/20 2970  metroNIDAZOLE (FLAGYL) IVPB premix 500 mg  500 mg IntraVENous Q8H Tiara Llanos  mL/hr at 12/01/20 0656 362 mg at 12/01/20 0502  cefepime (MAXIPIME) 2 g in 0.9% sodium chloride (MBP/ADV) 100 mL MBP  2 g IntraVENous Q24H Berlin Zuniga  mL/hr at 11/30/20 2009 2 g at 11/30/20 2009  
 glucose chewable tablet 16 g  4 Tab Oral PRN Berlin Zuniga MD      
 glucagon (GLUCAGEN) injection 1 mg  1 mg IntraMUSCular PRN Berlin Zuniga MD      
 dextrose 10% infusion 0-250 mL  0-250 mL IntraVENous PRN Berlin Zuniga MD      
 insulin lispro (HUMALOG) injection   SubCUTAneous AC&HS Berlin Zuniga MD   5 Units at 12/01/20 7061  oxyCODONE IR (ROXICODONE) tablet 5 mg  5 mg Oral Q4H PRN Cole Lomeli NP   5 mg at 11/30/20 2327  hydrOXYzine HCL (ATARAX) tablet 10 mg  10 mg Oral TID PRN Ashley Colon NP   10 mg at 11/29/20 1412  pantoprazole (PROTONIX) 40 mg in 0.9% sodium chloride 10 mL injection  40 mg IntraVENous DAILY Siobhan Harman NP   40 mg at 12/01/20 9663  allopurinoL (ZYLOPRIM) tablet 100 mg  100 mg Oral BID Duane Javier MD   100 mg at 12/01/20 0901  mycophenolate mofetil (CELLCEPT) capsule 250 mg  250 mg Oral ACB&D Sravanthi Lai MD   250 mg at 12/01/20 5268  zinc sulfate (ZINCATE) 220 (50) mg capsule 1 Cap  1 Cap Oral DAILY Ashley Colon NP   1 Cap at 12/01/20 0901  
 ascorbic acid (vitamin C) (VITAMIN C) tablet 500 mg  500 mg Oral BID Ashley Colon NP   500 mg at 12/01/20 3842  cholecalciferol (VITAMIN D3) (1000 Units /25 mcg) tablet 2 Tab  2,000 Units Oral DAILY Ashley Colon NP   2 Tab at 12/01/20 0901  
 albuterol (PROVENTIL HFA, VENTOLIN HFA, PROAIR HFA) inhaler 2 Puff  2 Puff Inhalation Q4H PRN Ashley Colon NP   2 Puff at 11/29/20 2158  zinc oxide-cod liver oil (DESITIN) 40 % paste   Topical Q8H Jf Romero DO      
 acetaminophen (TYLENOL) tablet 650 mg  650 mg Oral Q6H PRN Charan Raygoza MD   650 mg at 11/29/20 1412 Or  acetaminophen (TYLENOL) suppository 650 mg  650 mg Rectal Q6H PRN Ravi Baeza MD      
 sodium chloride (NS) flush 5-40 mL  5-40 mL IntraVENous Q8H Charan Raygoza MD   10 mL at 12/01/20 5807  sodium chloride (NS) flush 5-40 mL  5-40 mL IntraVENous PRN Charan Raygoza MD      
 polyethylene glycol (MIRALAX) packet 17 g  17 g Oral DAILY PRN Charan Raygoza MD      
 enoxaparin (LOVENOX) injection 30 mg  30 mg SubCUTAneous Q24H Cahran Raygoza MD   30 mg at 11/30/20 1731  ondansetron (ZOFRAN) injection 4 mg  4 mg IntraVENous Q6H PRN Charan Raygoza MD      
 amLODIPine (NORVASC) tablet 10 mg  10 mg Oral DAILY Charan Raygoza MD   10 mg at 12/01/20 0901  aspirin delayed-release tablet 81 mg  81 mg Oral DAILY Charan Raygoza MD   81 mg at 12/01/20 0901  
 melatonin tablet 3 mg  3 mg Oral QHS Charan Raygoza MD   3 mg at 11/30/20 2204  metoprolol tartrate (LOPRESSOR) tablet 50 mg  50 mg Oral BID Charan Raygoza MD   50 mg at 12/01/20 0901  labetaloL (NORMODYNE;TRANDATE) injection 20 mg  20 mg IntraVENous Q4H PRN Charan Flores MD   20 mg at 11/30/20 1853 Objective:  
 
Patient Vitals for the past 24 hrs: 
 BP Temp Pulse Resp SpO2 Weight 12/01/20 0907 (!) 142/70  79     
12/01/20 0702 (!) 157/58 97.6 °F (36.4 °C) 72 18 96 %   
12/01/20 0329 (!) 152/68 97.6 °F (36.4 °C) 65 18  73.4 kg (161 lb 13.1 oz)  
11/30/20 2317 (!) 172/55 98 °F (36.7 °C) 68 18 99 %   
11/30/20 2202 (!) 184/64  70     
11/30/20 1919 132/75 97.7 °F (36.5 °C) 67 12 95 %   
11/30/20 1713 (!) 175/67  70     
11/30/20 1517 (!) 163/60 97.6 °F (36.4 °C) 75 19 100 %   
11/30/20 1258 (!) 173/60 97.9 °F (36.6 °C) 63 16 100 %   
11/30/20 1003 (!) 181/75  72    Gen: NAD HEENT: PERRL, Sclerae anicteric Cv: RRR without m/r/g Pulm: CTA bilaterally Abd: NABS, NTND, No HSM Ext: No c/c/e Available labs reviewed: 
Labs:   
Recent Results (from the past 24 hour(s)) GLUCOSE, POC Collection Time: 11/30/20  1:00 PM  
Result Value Ref Range  Glucose (POC) 221 (H) 65 - 100 mg/dL Performed by Mckenzie Garcia GLUCOSE, POC Collection Time: 11/30/20  5:08 PM  
Result Value Ref Range Glucose (POC) 234 (H) 65 - 100 mg/dL Performed by Catina Perla GLUCOSE, POC Collection Time: 11/30/20  9:46 PM  
Result Value Ref Range Glucose (POC) 226 (H) 65 - 100 mg/dL Performed by Batool Meneses CBC WITH AUTOMATED DIFF Collection Time: 12/01/20  3:57 AM  
Result Value Ref Range WBC 9.4 3.6 - 11.0 K/uL  
 RBC 4.03 3.80 - 5.20 M/uL  
 HGB 10.8 (L) 11.5 - 16.0 g/dL HCT 36.4 35.0 - 47.0 % MCV 90.3 80.0 - 99.0 FL  
 MCH 26.8 26.0 - 34.0 PG  
 MCHC 29.7 (L) 30.0 - 36.5 g/dL  
 RDW 18.6 (H) 11.5 - 14.5 % PLATELET 162 969 - 799 K/uL MPV 10.8 8.9 - 12.9 FL  
 NRBC 0.0 0  WBC ABSOLUTE NRBC 0.00 0.00 - 0.01 K/uL NEUTROPHILS 96 (H) 32 - 75 % LYMPHOCYTES 1 (L) 12 - 49 % MONOCYTES 2 (L) 5 - 13 % EOSINOPHILS 0 0 - 7 % BASOPHILS 0 0 - 1 % IMMATURE GRANULOCYTES 1 (H) 0.0 - 0.5 % ABS. NEUTROPHILS 9.0 (H) 1.8 - 8.0 K/UL  
 ABS. LYMPHOCYTES 0.1 (L) 0.8 - 3.5 K/UL  
 ABS. MONOCYTES 0.2 0.0 - 1.0 K/UL  
 ABS. EOSINOPHILS 0.0 0.0 - 0.4 K/UL  
 ABS. BASOPHILS 0.0 0.0 - 0.1 K/UL  
 ABS. IMM. GRANS. 0.1 (H) 0.00 - 0.04 K/UL  
 DF SMEAR SCANNED    
 PLATELET COMMENTS Large Platelets RBC COMMENTS ANISOCYTOSIS 1+ 
    
 RBC COMMENTS NATE CELLS 1+ 
    
D DIMER Collection Time: 12/01/20  4:57 AM  
Result Value Ref Range D-dimer 1.00 (H) 0.00 - 0.65 mg/L FEU METABOLIC PANEL, COMPREHENSIVE Collection Time: 12/01/20  4:57 AM  
Result Value Ref Range Sodium 134 (L) 136 - 145 mmol/L Potassium 6.2 (H) 3.5 - 5.1 mmol/L Chloride 110 (H) 97 - 108 mmol/L  
 CO2 17 (L) 21 - 32 mmol/L Anion gap 7 5 - 15 mmol/L Glucose 249 (H) 65 - 100 mg/dL BUN 49 (H) 6 - 20 MG/DL Creatinine 1.65 (H) 0.55 - 1.02 MG/DL  
 BUN/Creatinine ratio 30 (H) 12 - 20 GFR est AA 37 (L) >60 ml/min/1.73m2 GFR est non-AA 31 (L) >60 ml/min/1.73m2  Calcium 9.2 8.5 - 10.1 MG/DL Bilirubin, total 0.5 0.2 - 1.0 MG/DL  
 ALT (SGPT) 17 12 - 78 U/L  
 AST (SGOT) 34 15 - 37 U/L Alk. phosphatase 144 (H) 45 - 117 U/L Protein, total 7.0 6.4 - 8.2 g/dL Albumin 2.5 (L) 3.5 - 5.0 g/dL Globulin 4.5 (H) 2.0 - 4.0 g/dL A-G Ratio 0.6 (L) 1.1 - 2.2 FERRITIN Collection Time: 12/01/20  4:57 AM  
Result Value Ref Range Ferritin 1,158 (H) 8 - 252 NG/ML  
C REACTIVE PROTEIN, QT Collection Time: 12/01/20  4:57 AM  
Result Value Ref Range C-Reactive protein 7.25 (H) 0.00 - 0.60 mg/dL LD Collection Time: 12/01/20  4:57 AM  
Result Value Ref Range  (H) 81 - 246 U/L  
RENAL FUNCTION PANEL Collection Time: 12/01/20  4:58 AM  
Result Value Ref Range Sodium 140 136 - 145 mmol/L Potassium 6.5 (H) 3.5 - 5.1 mmol/L Chloride 114 (H) 97 - 108 mmol/L  
 CO2 14 (LL) 21 - 32 mmol/L Anion gap 12 5 - 15 mmol/L Glucose 240 (H) 65 - 100 mg/dL BUN 49 (H) 6 - 20 MG/DL Creatinine 1.72 (H) 0.55 - 1.02 MG/DL  
 BUN/Creatinine ratio 28 (H) 12 - 20 GFR est AA 35 (L) >60 ml/min/1.73m2 GFR est non-AA 29 (L) >60 ml/min/1.73m2 Calcium 9.4 8.5 - 10.1 MG/DL Phosphorus 3.0 2.6 - 4.7 MG/DL Albumin 2.5 (L) 3.5 - 5.0 g/dL GLUCOSE, POC Collection Time: 12/01/20  8:34 AM  
Result Value Ref Range Glucose (POC) 266 (H) 65 - 100 mg/dL Performed by Tameka Barnett P Assessment and Plan  
 
70 y/o woman with ESRD s/p renal transplant. 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 Developed dyspnea during port placement in anticipation of chemtoherapy start and transferred here. Noted to be positive for COVID. Given rituximab for bulky oropharyngeal disease, chemo held due to Blaise Doe 193. 1. PTLD/DLBCL: Will eventually need access. Will ask general surgery to see for placement. Next dose of rituximab +/- CHOP in 16 days. 2. COVID: sat'ing well on room air. 3. Disposition: severely deconditioned, will likely need rehab.   
 
Thank you for allowing us to participate in the care of this very pleasant patient. Keke Rosas MD 
Hematology/Oncology Phone [phone]

## 2020-12-02 LAB
M PNEUMO IGG SER IA-ACNC: <100 U/ML (ref 0–99)
M PNEUMO IGM SER IA-ACNC: <770 U/ML (ref 0–769)
TACROLIMUS, UTACRT: 6.7 NG/ML

## 2020-12-02 NOTE — PROGRESS NOTES
Patient death recorded on the [facility] internal restraint log on 12/2/2020 date at 0234 time.

## 2020-12-02 NOTE — DISCHARGE SUMMARY
[facility] Discharge Summary Patient: Angelica Friedman MRN: [id number] YOB: 1950 Age: 69 y.o. Date of admission:  2020 Date of discharge:  2020 Primary care provider:  Maryan Mendoza MD  
 
Admitting provider:  Radha Coronado MD 
 
Discharging provider(s): Junito Davis, NP - Staff [address] Consultations · IP CONSULT TO INTENSIVIST · IP CONSULT TO NEPHROLOGY · IP CONSULT TO PALLIATIVE CARE - PROVIDER 
· IP CONSULT TO INFECTIOUS DISEASES 
· IP CONSULT TO HOSPITALIST · IP CONSULT TO GENERAL SURGERY 
· IP CONSULT TO PULMONOLOGY Procedures · Endotracheal intubation Discharge destination: Patient is . Admission diagnosis · Lymphoma ([facility] [address] Sadia Hutchison Please refer to the admission history and physical for details on the presenting problem. Final discharge diagnoses and brief hospital course Septic shock Lymphoma Immunocompromise state S/p nephrectomy COVID-19 pneumonia Cardiac arrest 
Metabolic encephalopathy CATALINA Patient Chi Chaney is a 65-year-old female seen and examined today by critical care services due to initial complaints of fever, chills, lethargy, and nausea. Patient has past medical history for large cell lymphoma diagnosed in 2020 after tonsillar biopsy, CKD, type 2 diabetes, CHF, hypertension, EMMANUEL, throat cancer. Patient was admitted to ICU and advanced stages of her disease process for lymphoma. Patient was immunocompromised and in his septic shock. Patient required IV pressors due to life-threatening hypotension and patient started on IV antibiotic therapy however her hypotension and sepsis attributed to her worsening respiratory status secondary to COVID-19 pneumonia.   Patient developed ARDS and eventually decompensated and went into PEA arrest where she received CPR and had ROSC after multiple rounds of CPR. I spoke with the family and it was decided that they wanted to extend no further heroic measures to extend her life. Patient was made a DNR/comfort measures. We compassionately extubated the patient and she  at 2244 on 2020. Family was present at bedside. Dr. Delilah Vazquez critical care intensivist notified. Physical examination at discharge Visit Vitals /82 (BP 1 Location: Right arm, BP Patient Position: At rest) Pulse (!) 0 Temp 98.2 °F (36.8 °C) Resp (!) 0 Ht 5' 3\" (1.6 m) Wt 73.4 kg (161 lb 13.1 oz) SpO2 100% BMI 28.66 kg/m² Recent Labs 20 
6722 20 
0350 20 
0212 WBC 9.4 8.6 6.2 HGB 10.8* 10.8* 13.5 HCT 36.4 36.2 46.0  155 192 Recent Labs 20 
1544 20 
1112 20 
0458  20 
0350 20 
7402  146* 140   < > 139 138  
K 5.1 3.7 6.5*   < > 5.7* 5.0  
* 119* 114*   < > 109* 108 CO2 20* 14* 14*   < > 22 25 BUN 57* 42* 49*   < > 56* 52* CREA 1.76* 1.53* 1.72*   < > 1.69* 1.78* * 235* 240*   < > 240* 209* CA 9.2 5.9* 9.4   < > 9.4 9.4 MG 2.1  --   --   --   --   --   
PHOS  --   --  3.0  --  3.2 3.0  
 < > = values in this interval not displayed. Recent Labs 20 
0836 20 
0457 20 
0350 AP  --  144* 133* TP  --  7.0 6.6 ALB 2.5* 2.5* 2.5*  
GLOB  --  4.5* 4.1* Recent Labs 20 
1112 INR 1.2* PTP 12.6* APTT 40.0* Recent Labs 20 
0457 FERR 1,158* No results for input(s): PH, PCO2, PO2 in the last 72 hours. No results for input(s): CPK, CKMB in the last 72 hours. No lab exists for component: TROPONINI No components found for: [place] Pertinent imaging studies: 
 
See chart 
--------------------------------- Chronic Diagnoses:   
Problem List as of 2020 Date Reviewed: 2020 Codes Class Noted - Resolved  * (Principal) Acute respiratory distress syndrome (ARDS) due to COVID-19 virus [facility]) ICD-10-CM: U07.1, J80 
ICD-9-CM: 518.82, 079.89 Acute 12/1/2020 - Present Acute hypoxemic respiratory failure due to COVID-19 [facility]) ICD-10-CM: U07.1, J96.01 
ICD-9-CM: 518.81, 079.89, 799.02 Acute 11/30/2020 - Present Kidney disease, chronic, stage IV (severe, EGFR 15-29 ml/min) (HCC) ICD-10-CM: N18.4 ICD-9-CM: 585.4 End Stage 12/1/2020 - Present Severe dehydration ICD-10-CM: E86.0 ICD-9-CM: 276.51 Acute 12/1/2020 - Present Acute kidney injury superimposed on CKD ([facility] ICD-10-CM: N17.9, N18.9 ICD-9-CM: 866.00, 585.9 Acute 11/30/2020 - Present Respiratory tract infection due to COVID-19 virus ICD-10-CM: U07.1, J98.8 ICD-9-CM: 519.8, 079.89 Acute 11/24/2020 - Present EMMANUEL (obstructive sleep apnea) (Chronic) ICD-10-CM: G47.33 
ICD-9-CM: 327.23 End Stage 11/24/2020 - Present Steroid-induced hyperglycemia ICD-10-CM: R73.9, T38.0X5A 
ICD-9-CM: 790.29, E932.0 Acute 11/24/2020 - Present Long-term use of aspirin therapy ICD-10-CM: Z79.82 ICD-9-CM: V58.66 Acute 12/1/2020 - Present Overview Signed 12/1/2020  6:40 PM by Kirit Camargo MD  
  ASA 81 mg Daily Post-transplant lymphoproliferative disorder [facility]) ICD-10-CM: T86.99, D47.Z1 
ICD-9-CM: 996.80, 238.77  11/25/2020 - Present Lymphoma ([facility] ICD-10-CM: C85.90 ICD-9-CM: 202.80  11/24/2020 - Present CHF (congestive heart failure) (HCC) ICD-10-CM: I50.9 ICD-9-CM: 428.0  11/14/2020 - Present Tonsillar mass ICD-10-CM: J35.8 ICD-9-CM: 474.8  10/26/2020 - Present Time spent on discharge related activities today greater than 20 minutes. Signed:  
 
 Chrissy Salazar NP Staff C/ Catie Mercer  
 12/2/2020 
 12:42 AM 
 
 Dr. Brian king Attending Cc:  Kristine Lopez MD

## 2020-12-02 NOTE — PROGRESS NOTES
1900: Bedside shift change report given to Evelina Glasgow (oncoming nurse) by Ina Sam (offgoing nurse). Report included the following information SBAR, Kardex, Intake/Output and MAR.  
 
1922: Pt arrived from [facility] on non-breather and midflow. Pt sats 78-85%. Pt very agitated, pulling at lines and clawing RN's. Pt stating \" I can't breathe\". 1927: Pt given haldol for agitation and restlessness. 1931: Pt began to rigo down to 40's and lost pulse. RN began CPR. 1934: 1 mg epi given. Pulse check- pt still has no pulse. CPR resumed. 1936: Pt regained pulse. CPR stopped. See code blue navigator for meds given. Sats 70's. 
 
1938: 1 amp bicarb given. 1939: Pt intubated. Propofol started. 1947: Pt made a DNR and comfort care. 2000: Pt resting comfortably in bed, sedated on propofol. RN spoke with family regarding visitation policy. Family aware and appreciative. Daughters are COVID +. Pt's  the designated person to come up to the bedside.  is COVID -. Family to have a zoom call with pt to say goodbyes. Bedside shift change report given to [facility] (oncoming nurse) by Evelina Glasgow (offgoing nurse). Report included the following information SBAR, Kardex, Intake/Output and MAR.

## 2020-12-02 NOTE — PROGRESS NOTES
Called by RN to ICU 3 for patient agitation, patient transferred from intermediate unit to ICU, agitated, attempting to remove oxygen, patient confused, agonally breathing, lost pulses, code blue called. Patient intubated, started on vasoactive agents.  
 
Serena Simpson MD

## 2020-12-02 NOTE — PROGRESS NOTES
Spiritual Care Assessment/Progress Note [address] 
 
 
NAME: Valentin Hernandez      MRN: [id number] AGE: 69 y.o. SEX: female Religion Affiliation: Voodoo  
Language: English  
 
12/1/2020     Total Time (in minutes): 25 Spiritual Assessment begun in [address] [facility] through conversation with: 
  
    []Patient        [x] Family    [] Friend(s) Reason for Consult: Death, Inpatient Spiritual beliefs: (Please include comment if needed) [x] Identifies with a monica tradition:     
   [] Supported by a monica community:        
   [] Claims no spiritual orientation:       
   [] Seeking spiritual identity:            
   [] Adheres to an individual form of spirituality:       
   [] Not able to assess:                   
 
    
Identified resources for coping:  
   [] Prayer                           
   [] Music                  [] Guided Imagery [x] Family/friends                 [] Pet visits [] Devotional reading                         [] Unknown 
   [] Other:                                         
 
 
Interventions offered during this visit: (See comments for more details) Family/Friend(s): Affirmation of emotions/emotional suffering, Affirmation of monica, Catharsis/review of pertinent events in supportive environment, End of life issues discussed, Iconic (affirming the presence of God/Higher Power), Normalization of emotional/spiritual concerns Plan of Care: 
 
 [] Support spiritual and/or cultural needs  
 [] Support AMD and/or advance care planning process    
 [] Support grieving process 
 [] Coordinate Rites and/or Rituals  
 [] Coordination with community clergy  [] No spiritual needs identified at this time 
 [] Detailed Plan of Care below (See Comments)  [] Make referral to Music Therapy 
[] Make referral to Pet Therapy    
[] Make referral to Addiction services 
[] Make referral to [facility] 
[] Make referral to Spiritual Care Partner 
[] No future visits requested       
[x] Follow up visits as needed Visited in response to pt's transition to comfort care. Both of pt's daughters are COVID-19+ and unable to visit.  unable to enter room due to COVID-19 isolation precautions. Pt's  Wilberto Overton was able to be with pt until death and other family members were on a Zoom connection. Met pt's nephew Author Hall outside of pt's room. He drove Wilberto Overton to the hospital. Please contact [phone] [facility] for further support. Chaplain Patel MDiv, MS, [facility] 
[phone]

## 2020-12-02 NOTE — PROGRESS NOTES
1930: Patient coding, see code blue documentation : At bedside with RN, patient intubated, started on propofol, at this time patient is now on comfort measures : Bedside shift change report given to Jamal (oncoming nurse) by Malu Ash (offgoing nurse). Report included the following information SBAR, Kardex, Procedure Summary, Intake/Output, MAR, Accordion and Cardiac Rhythm SR/SB with frequent PVCs. Ranoelle Feng NP ordered compassionate extubation, RT paged. : Propofol gtt stopped per Lord Jak MODI, patient appears agitated and uncomfortable, prn Ativan and Robinol given for comfort. : Patient extubated : Patient's , Jose Due at bedside, family on zoom call. 4: Patient no pulse, asystole, NP at bedside to pronounce time of death. 2340: Patient's , Jose Due, properly doffed PPE with this RN, patient's belongings given to  in clean bag, instructed  how to properly clean belongings, patient's  on the way home. 2345: Called patient's Marci aleman. Explained process for notifying hospital of  arangements, gave daughter nursing supervisor number [phone]

## 2020-12-02 NOTE — PROGRESS NOTES
I was notified by the nurses that the patient was asystole on the monitor. When examined the patient had no palpable pulse. No pulse auscultation. No spontaneous respirations. No response to pain. Pupils fixed and dilated. No corneal reflex. Patient was pronounced  at 21 2:44 PM on 2020. Family was at bedside. Patient was made a DNR and was placed on comfort measures and was compassionately extubated with no issues. Dr. Madyson Chirinos critical care intensivist notified.

## 2020-12-02 NOTE — PROGRESS NOTES
[facility]  Hospitalist Group Hospitalist Progress Note Cecilia Ceja MD 
Answering service: [phone] OR [phone] from in house phone Date of Service:  2020 NAME:  Clare Lindsey :  1950 MRN:  [id number] Admission Summary:  
Per H and P \" 58-year-old female with multiple medical problem was recently diagnosed with diffuse large cell lymphoma late 2020 after tonsillar biopsy. She did not start treatment yet. For the last few days she noticed intermittent chills and fever\". Interval history / Subjective:  
 Patient seen and examined  This morning,she was oriented 1-2. Complained of  Some abdominal pain. Her oxygen requirements continued to increase throughout the day -CXR showed worsening infiltrates. Patient's family consented for convalascent plasma transfusion,I explained to the patient as well yesterday and she agreed. ABG showed metabolic acidosis, hypoxia- transitioned to high flow nasal cannula. Discussed with intensivist - patient accepted for ICU transfer. Assessment & Plan: # COVID 19 positive #Acute on chronic hypoxic resp failure -worsening #Chronic hypoxic resp failure due to COPD Bacterial pneumonia 
-not a candidate for remdesevir due to CKD 
-CXR today  showed worsening airspace disease 
-continue broad spectrum abx - linezolid,cefepime and doxycycline. convalascent plasma transfusion 
-received one dose of lasix today 
-On high flow - high risk of detoriaration - with oropharyngeal mass, she needs close monitoring in the ICU in case she needs intubation. -on steroids #Esophageal candidiasis: 
-per pharmacy patient did not  prescription for fluconazole 
-started eraxis as fluconozaole interacts with prograf # ESRD s/p renal transplant 
-on cellcept ,tacrolimus and steroids # PTLD-diffuse B nikki lymphoma -received rituximab this admission 
-hematology following # C diff colitis: 
-on oral vancomycin and IV flagyl 
-KUB yesterday wnl #Acute on chronic kidney disease: #Hyperkalemia and metabolic acidosis 
-renal function stable. -on low potassium diet and supplements. Received valtessa Potassium continued to remain high - started on bicarb fluids today Repeat BMP afternoon -potassium better. Chronic anemia: 
-hb stable. #HTN: 
-on amlodipine,hydralazine,clonidine with holding parameters Code status: full DVT prophylaxis: lovenox Care Plan discussed with:patient,nurse,intensivist,nephrologist, pallaitve care physician. Spoke to patient's daughters over phone -updated clinical course so far Anticipated Disposition: tbd Anticipated Discharge:tbd Hospital Problems  Date Reviewed: 11/24/2020 Codes Class Noted POA * (Principal) Acute respiratory distress syndrome (ARDS) due to COVID-19 virus [facility]) ICD-10-CM: U07.1, J80 
ICD-9-CM: 518.82, 079.89 Acute 12/1/2020 Yes Acute hypoxemic respiratory failure due to COVID-19 [facility]) ICD-10-CM: U07.1, J96.01 
ICD-9-CM: 518.81, 079.89, 799.02 Acute 11/30/2020 No  
   
 Kidney disease, chronic, stage IV (severe, EGFR 15-29 ml/min) ([facility]) ICD-10-CM: N18.4 ICD-9-CM: 585.4 End Stage 12/1/2020 Yes Severe dehydration ICD-10-CM: E86.0 ICD-9-CM: 276.51 Acute 12/1/2020 Yes Acute kidney injury superimposed on CKD ([facility] [address] ICD-10-CM: N17.9, N18.9 ICD-9-CM: 866.00, 585.9 Acute 11/30/2020 No  
   
 Respiratory tract infection due to COVID-19 virus ICD-10-CM: U07.1, J98.8 ICD-9-CM: 519.8, 079.89 Acute 11/24/2020 Yes EMMANUEL (obstructive sleep apnea) (Chronic) ICD-10-CM: G47.33 
ICD-9-CM: 327.23 End Stage 11/24/2020 Yes Steroid-induced hyperglycemia ICD-10-CM: R73.9, T38.0X5A 
ICD-9-CM: 790.29, E932.0 Acute 11/24/2020 Yes Long-term use of aspirin therapy ICD-10-CM: Z79.82 ICD-9-CM: V58.66 Acute 12/1/2020 Yes  Overview Signed 12/1/2020  6:40 PM by Minna Hernandez MD  
  ASA 81 mg Daily Post-transplant lymphoproliferative disorder [facility]) ICD-10-CM: T86.99, D47.Z1 
ICD-9-CM: 996.80, 238.77  11/25/2020 Yes Lymphoma ([facility] [address] ICD-10-CM: C85.90 ICD-9-CM: 202.80  11/24/2020 Yes Review of Systems: As per HPI Vital Signs:  
 Last 24hrs VS reviewed since prior progress note. Most recent are: 
Visit Vitals BP (!) 130/53 (BP 1 Location: Right arm, BP Patient Position: At rest) Pulse 61 Temp (!) 94.1 °F (34.5 °C) Resp 22 Ht 5' 3\" (1.6 m) Wt 73.4 kg (161 lb 13.1 oz) SpO2 96% BMI 28.66 kg/m² Intake/Output Summary (Last 24 hours) at 12/1/2020 1912 Last data filed at 12/1/2020 0400 Gross per 24 hour Intake  Output 350 ml Net -350 ml Physical Examination:  
 
I had a face to face encounter with this patient and independently examined them on 12/1/2020 as outlined below: 
 
     
Constitutional:  was resting in bed this morning,alert and awake Resp:  decreased breath sounds bilaterally, crackles + CV:  Regular rhythm, normal rate, no murmurs GI:  Soft, non distended,minimal enderness +, ventral hernia +, Musculoskeletal:  No LE edema Neurologic:  Moves all extremities. Awake and alert,oriented X2 Psych:  Not anxious nor agitated. Data Review:  
 Review and/or order of clinical lab test,imaging Review and/or order of tests in the medicine section of [facility] Labs:  
 
Recent Labs 12/01/20 
071 [id number] 37 11/30/20 
0350 WBC 9.4 8.6 HGB 10.8* 10.8* HCT 36.4 36.2  155 Recent Labs 12/01/20 
1544 12/01/20 
1112 12/01/20 
0458  11/30/20 
0350 11/29/20 
5862  146* 140   < > 139 138  
K 5.1 3.7 6.5*   < > 5.7* 5.0  
* 119* 114*   < > 109* 108 CO2 20* 14* 14*   < > 22 25 BUN 57* 42* 49*   < > 56* 52* CREA 1.76* 1.53* 1.72*   < > 1.69* 1.78* * 235* 240*   < > 240* 209* CA 9.2 5.9* 9.4   < > 9.4 9.4 MG 2.1  --   --   --   --   --   
PHOS  --   --  3.0  --  3.2 3.0  
 < > = values in this interval not displayed. Recent Labs 12/01/20 
8334 12/01/20 
0457 11/30/20 
0350 ALT  --  17 19 AP  --  144* 133* TBILI  --  0.5 0.4 TP  --  7.0 6.6 ALB 2.5* 2.5* 2.5*  
GLOB  --  4.5* 4.1* Recent Labs 12/01/20 
1112 INR 1.2* PTP 12.6* APTT 40.0* Recent Labs 12/01/20 0457 FERR 1,158* No results found for: FOL, RBCF No results for input(s): PH, PCO2, PO2 in the last 72 hours. No results for input(s): CPK, CKNDX, TROIQ in the last 72 hours. No lab exists for component: CPKMB No results found for: CHOL, CHOLX, CHLST, CHOLV, HDL, HDLP, LDL, LDLC, DLDLP, TGLX, TRIGL, TRIGP, CHHD, CHHDX Lab Results Component Value Date/Time Glucose (POC) 299 (H) 12/01/2020 06:36 PM  
 Glucose (POC) 317 (H) 12/01/2020 04:34 PM  
 Glucose (POC) 285 (H) 12/01/2020 11:24 AM  
 Glucose (POC) 266 (H) 12/01/2020 08:34 AM  
 Glucose (POC) 226 (H) 11/30/2020 09:46 PM  
 
Lab Results Component Value Date/Time Color Yellow/Straw 11/24/2020 11:50 AM  
 Appearance Clear 11/24/2020 11:50 AM  
 Specific gravity 1.009 11/24/2020 11:50 AM  
 pH (UA) 7.0 11/24/2020 11:50 AM  
 Protein 100 (A) 11/24/2020 11:50 AM  
 Glucose 50 (A) 11/24/2020 11:50 AM  
 Ketone Negative 11/24/2020 11:50 AM  
 Bilirubin Negative 11/24/2020 11:50 AM  
 Urobilinogen 0.1 11/24/2020 11:50 AM  
 Nitrites Negative 11/24/2020 11:50 AM  
 Leukocyte Esterase Negative 11/24/2020 11:50 AM  
 Bacteria Negative 11/24/2020 11:50 AM  
 WBC 0-4 11/24/2020 11:50 AM  
 RBC 0-5 11/24/2020 11:50 AM  
 
 
 
Medications Reviewed:  
 
Current Facility-Administered Medications Medication Dose Route Frequency  tacrolimus (PROGRAF) capsule 1.5 mg  1.5 mg Oral Q12H  
 sodium bicarbonate (8.4%) 150 mEq in sterile water 1,000 mL infusion   IntraVENous CONTINUOUS  
 insulin glargine (LANTUS) injection 7 Units  7 Units SubCUTAneous DAILY  0.9% sodium chloride infusion 250 mL  250 mL IntraVENous PRN  
 linezolid in dextrose 5% (ZYVOX) IVPB premix in D5W 600 mg  600 mg IntraVENous Q12H  
 sodium chloride (NS) flush 5-40 mL  5-40 mL IntraVENous Q8H  
 sodium chloride (NS) flush 5-40 mL  5-40 mL IntraVENous PRN  
 hydrALAZINE (APRESOLINE) tablet 100 mg  100 mg Oral TID  anidulafungin (ERAXIS) 100 mg in 0.9% sodium chloride 130 mL IVPB  100 mg IntraVENous Q24H  cloNIDine HCL (CATAPRES) tablet 0.1 mg  0.1 mg Oral BID  doxycycline (VIBRAMYCIN) 100 mg in 0.9% sodium chloride (MBP/ADV) 100 mL MBP  100 mg IntraVENous Q12H  
 vancomycin (FIRVANQ) 50 mg/mL oral solution 125 mg  125 mg Oral Q6H  
 nystatin (MYCOSTATIN) 100,000 unit/mL oral suspension 500,000 Units  500,000 Units Oral QID  methylPREDNISolone (PF) (SOLU-MEDROL) injection 40 mg  40 mg IntraVENous Q12H  
 metroNIDAZOLE (FLAGYL) IVPB premix 500 mg  500 mg IntraVENous Q8H  
 cefepime (MAXIPIME) 2 g in 0.9% sodium chloride (MBP/ADV) 100 mL MBP  2 g IntraVENous Q24H  
 glucose chewable tablet 16 g  4 Tab Oral PRN  
 glucagon (GLUCAGEN) injection 1 mg  1 mg IntraMUSCular PRN  
 dextrose 10% infusion 0-250 mL  0-250 mL IntraVENous PRN  
 insulin lispro (HUMALOG) injection   SubCUTAneous AC&HS  
 oxyCODONE IR (ROXICODONE) tablet 5 mg  5 mg Oral Q4H PRN  
 hydrOXYzine HCL (ATARAX) tablet 10 mg  10 mg Oral TID PRN  pantoprazole (PROTONIX) 40 mg in 0.9% sodium chloride 10 mL injection  40 mg IntraVENous DAILY  allopurinoL (ZYLOPRIM) tablet 100 mg  100 mg Oral BID  mycophenolate mofetil (CELLCEPT) capsule 250 mg  250 mg Oral ACB&D  
 zinc sulfate (ZINCATE) 220 (50) mg capsule 1 Cap  1 Cap Oral DAILY  ascorbic acid (vitamin C) (VITAMIN C) tablet 500 mg  500 mg Oral BID  cholecalciferol (VITAMIN D3) (1000 Units /25 mcg) tablet 2 Tab  2,000 Units Oral DAILY  albuterol (PROVENTIL HFA, VENTOLIN HFA, PROAIR HFA) inhaler 2 Puff  2 Puff Inhalation Q4H PRN  zinc oxide-cod liver oil (DESITIN) 40 % paste   Topical Q8H  
 acetaminophen (TYLENOL) tablet 650 mg  650 mg Oral Q6H PRN Or  
 acetaminophen (TYLENOL) suppository 650 mg  650 mg Rectal Q6H PRN  
 sodium chloride (NS) flush 5-40 mL  5-40 mL IntraVENous Q8H  
 sodium chloride (NS) flush 5-40 mL  5-40 mL IntraVENous PRN  polyethylene glycol (MIRALAX) packet 17 g  17 g Oral DAILY PRN  
 enoxaparin (LOVENOX) injection 30 mg  30 mg SubCUTAneous Q24H  
 ondansetron (ZOFRAN) injection 4 mg  4 mg IntraVENous Q6H PRN  
 amLODIPine (NORVASC) tablet 10 mg  10 mg Oral DAILY  aspirin delayed-release tablet 81 mg  81 mg Oral DAILY  melatonin tablet 3 mg  3 mg Oral QHS  metoprolol tartrate (LOPRESSOR) tablet 50 mg  50 mg Oral BID  labetaloL (NORMODYNE;TRANDATE) injection 20 mg  20 mg IntraVENous Q4H PRN  
 
______________________________________________________________________ EXPECTED LENGTH OF STAY: 5d 12h ACTUAL LENGTH OF STAY:          7 Lilia Landaverde MD

## 2020-12-02 NOTE — PROGRESS NOTES
ID Progress Note 
2020 Subjective: Afebrile. She is intubated. ROS: Cannot be done. Objective:  
 
Vitals:  
Visit Vitals BP (!) 130/53 (BP 1 Location: Right arm, BP Patient Position: At rest) Pulse 61 Temp (!) 94.1 °F (34.5 °C) Resp 22 Ht 5' 3\" (1.6 m) Wt 73.4 kg (161 lb 13.1 oz) SpO2 96% BMI 28.66 kg/m² Tmax:  Temp (24hrs), Av.8 °F (36 °C), Min:94.1 °F (34.5 °C), Max:98 °F (36.7 °C) Exam: On the ventilator Pink conjunctivae, anicteric sclerae No cervical lymphdenopathy Heart: RRR Abdomen: soft nontender, no guarding or rebound, seems she has an abdominal wall hernia Knees not warm or tender No rash Lab Results Component Value Date/Time WBC 9.4 2020 03:57 AM  
 HGB 10.8 (L) 2020 03:57 AM  
 HCT 36.4 2020 03:57 AM  
 PLATELET 936 5492 03:57 AM  
 MCV 90.3 2020 03:57 AM  
 
Lab Results Component Value Date/Time Sodium 137 2020 03:44 PM  
 Potassium 5.1 2020 03:44 PM  
 Chloride 110 (H) 2020 03:44 PM  
 CO2 20 (L) 2020 03:44 PM  
 Anion gap 7 2020 03:44 PM  
 Glucose 302 (H) 2020 03:44 PM  
 BUN 57 (H) 2020 03:44 PM  
 Creatinine 1.76 (H) 2020 03:44 PM  
 BUN/Creatinine ratio 32 (H) 2020 03:44 PM  
 GFR est AA 35 (L) 2020 03:44 PM  
 GFR est non-AA 28 (L) 2020 03:44 PM  
 Calcium 9.2 2020 03:44 PM  
 Bilirubin, total 0.5 2020 04:57 AM  
 Alk. phosphatase 144 (H) 2020 04:57 AM  
 Protein, total 7.0 2020 04:57 AM  
 Albumin 2.5 (L) 2020 04:58 AM  
 Globulin 4.5 (H) 2020 04:57 AM  
 A-G Ratio 0.6 (L) 2020 04:57 AM  
 ALT (SGPT) 17 2020 04:57 AM  
 
cxr  personally reviewed - worsening bilateral infiltrates Assessment:  
 
#1 bilateral infiltrates -Covid versus bacterial pneumonia versus other 
  
#2 COVID-19 
  
#3 c. diff 
  
#4 chronic kidney disease status post renal transplant 
  
#5 diabetes 
  
#6 heart failure 
  
#7 hypertension 
  
  
PLAN Recommendations:  
 
  
1. Continue Zyvox, cefepime, doxy. Ff up serologies  
  
2. Continue oral vancomycin and IV Flagyl for C. Difficile 
  
3. she cannot receive remdesivir because of her creatinine clearance is less than 30. She is already on steroids 
  
4. Continue micafungin for esophageal candidiasis.  
 
Andra Qiu MD

## 2020-12-02 NOTE — PROCEDURES
[facility] Procedure Note - Intubation:  
Performed by Joann Colin MD . Immediately prior to the procedure, the patient was reevaluated and found suitable for the planned procedure and any planned medications. Immediately prior to the procedure a time out was called to verify the correct patient, procedure, equipment, staff, and marking as appropriate. Medications given were none, responded to Code blue A number 7.5 cuffed ETT was placed to 23 cm at the teeth. Placement was evaluated by noting bilateral, symmetric breath sounds, good end-tidal CO2 detector color change , no breath sounds over stomach and bulb aspirator expands promptly. Attempts required: 1. Complications: none. The procedure was tolerated well.

## 2020-12-02 NOTE — DEATH NOTE
Death Declaration 12/1/2020 [facility] CR[facility]ARE Pt Name  Wanda Savage Admit date:  11/24/2020 Date and time of death:   12/1/2020 at 2244 PM  
Room Number  7103/01 Medical Record Number  [id number] @ [address]  
Age  69 y.o. Date of Birth 1950 PCP Kristine Lopez MD  
Attending physician  Dr. Brian king Code Status  DNR Patient seen and examined Mental status   Unresponsive Pupils Dilated and Fixed Respiration  no spontaneous breaths above ventilator set rate Pulse  Absent auscultation/palpation Heart Sounds  Absent auscultation Rhythm PEA arrest  
Family  Notified by Nursing staff at bedside [employer]  Notified by Nursing staff Death certificate and discharge summary completion remain  Dr. Brian king responsibility. Ever Miller MD MPH  FACP                              
12/2/2020

## 2020-12-04 LAB
C PNEUM IGG TITR SER IF: NORMAL {TITER}
C PNEUM IGM TITR SER IF: NORMAL {TITER}
C PSITTACI IGG TITR SER IF: NORMAL {TITER}
C PSITTACI IGM TITR SER IF: NORMAL {TITER}
C TRACH IGG TITR SER IF: NORMAL {TITER}
C TRACH IGM TITR SER IF: NORMAL {TITER}

## 2020-12-05 LAB
L PNEUMO IGG TITR SER IF: ABNORMAL {TITER}
L PNEUMO POOL 1-6 IGM SER QL: NORMAL

## 2020-12-06 NOTE — PROGRESS NOTES
Physician Progress Note      PATIENT:               Corky Rebollar  CSN #:                  [id number]  :                       1950  ADMIT DATE:       2020 5:37 PM  [address] [place] DATE:        2020 10:44 PM  RESPONDING  PROVIDER #:        Governor Tom Kwok NP          QUERY TEXT:    Pt admitted with Covid-19 PNA. Pt noted to have Septic Shock documented in the D/C Summ. . If possible, please document in progress notes and discharge summary the present on admission status of Sepsis :    The medical record reflects the following:  Risk Factors: Immunocompromised pt admitted with Covid-19  Clinical Indicators: VS on admission were 100.9, HR 79, RR21, /81. WBC 4.7/ Lactic Acid-0.7/ CRP 3.55 on admission. Pt found to have Covid-19 PNA, and CATALINA on admission. Her condition deteriorated with the  CXR showing worsening infiltrates, pt became Hypothermic, and CRP trey to 7.27. She required transfer to ICU, intubation and was being maintained on pressors.   Treatment: Zosyn/ Maxipime/ Doxycycline/ Zyvox/ Flagyl IV, 500 cc RL Bolus,    Thank you, if you have any questions please e-mail me at  [email or web address]  Options provided:  -- Yes, Sepsis was present at the time of the order to admit to the hospital  -- No, Sepsis was not present on admission and developed during the inpatient stay  -- Other - I will add my own diagnosis  -- Disagree - Not applicable / Not valid  -- Disagree - Clinically unable to determine / Unknown  -- Refer to Clinical Documentation Reviewer    PROVIDER RESPONSE TEXT:    Yes, Sepsis was present at the time of the order to admit to the hospital.    Query created by: Hu Correa on 12/3/2020 7:21 AM      Electronically signed by:  Governor Tom Kwok NP 2020 9:30 PM
